# Patient Record
Sex: MALE | Race: WHITE | NOT HISPANIC OR LATINO | Employment: FULL TIME | URBAN - METROPOLITAN AREA
[De-identification: names, ages, dates, MRNs, and addresses within clinical notes are randomized per-mention and may not be internally consistent; named-entity substitution may affect disease eponyms.]

---

## 2017-02-21 ENCOUNTER — ALLSCRIPTS OFFICE VISIT (OUTPATIENT)
Dept: OTHER | Facility: OTHER | Age: 32
End: 2017-02-21

## 2018-01-03 ENCOUNTER — ALLSCRIPTS OFFICE VISIT (OUTPATIENT)
Dept: OTHER | Facility: OTHER | Age: 33
End: 2018-01-03

## 2018-01-04 NOTE — PROGRESS NOTES
Assessment   1  Acute frontal sinusitis, recurrence not specified (461 1) (J01 10)    Plan   Acute frontal sinusitis, recurrence not specified    · Amoxicillin-Pot Clavulanate 875-125 MG Oral Tablet; TAKE 1 TABLET EVERY 12    HOURS DAILY   · Azelastine HCl - 0 15 % Nasal Solution; 1squirt each nostril qhs    Discussion/Summary      Sinusitis plan of care , rec preventative measures , recommend increased humidity in the house recommend humidifier,treatment , norah pot , nasal rinse , if no history of htn / cad consider otc decongestant / plain muccinex for short term treatment , otc  consider the use of nasal steroid short term  The patient was counseled regarding instructions for management,-- patient and family education,-- importance of compliance with treatment  Possible side effects of new medications were reviewed with the patient/guardian today  The treatment plan was reviewed with the patient/guardian  The patient/guardian understands and agrees with the treatment plan      Chief Complaint   Pt has complaints of right ear pain , nasal drip , sore throat and discomfort in the corner of the right eye ( started two weeks ago with congestion ) Per pt he took Ibuprofen around noon today  History of Present Illness   HPI: feeling ill  ear pain , st , sinus pressure increased last night , but has been increasing for the past 2 wks  postnasal drip increasing and night causing needed cough more fever chills, negative wheezing ill contact month at home   heating system forced hot air, air house very dry, considering getting home humidifier taking no medicines other than ibuprofen          Review of Systems        Constitutional: feeling poorly  ENT: earache,-- sore throat-- and-- nasal discharge  Cardiovascular: no complaints of slow or fast heart rate, no chest pain, no palpitations, no leg claudication or lower extremity edema  Respiratory: cough        Gastrointestinal: no complaints of abdominal pain, no constipation, no nausea or vomiting, no diarrhea or bloody stools  Genitourinary: no complaints of dysuria or incontinence, no hesitancy, no nocturia, no genital lesion, no inadequacy of penile erection  Integumentary: no complaints of skin rash or lesion, no itching or dry skin, no skin wounds  Neurological: no complaints of headache, no confusion, no numbness or tingling, no dizziness or fainting  ROS reviewed  Active Problems   1  Acute sinus infection (461 9) (J01 90)   2  Anxiety (300 00) (F41 9)   3  Depression (311) (F32 9)   4  Elevated blood pressure reading (401 9) (I10)   5  Male fertility problem (606 9) (N46 9)   6  Need for influenza vaccination (V04 81) (Z23)   7  Screening for cardiovascular condition (V81 2) (Z13 6)   8  URI (upper respiratory infection) (465 9) (J06 9)   9  Varicocele (456 4) (I86 1)    Past Medical History   Active Problems And Past Medical History Reviewed: The active problems and past medical history were reviewed and updated today  Family History   Father    1  Denied: Family history of mental disorder   2  Denied: Family history of substance abuse   3  Family history of Paroxysmal atrial tachycardia (427 0) (I47 1)  Family History    4  Family history of Gout (274 9) (M10 9)  Family History Reviewed: The family history was reviewed and updated today  Social History    ·    · Never a smoker   · Never a smoker   · No alcohol use   · No drug use  The social history was reviewed and updated today  The social history was reviewed and is unchanged  Surgical History   1  History of Eye Surgery   2  History of Nasal Septal Deviation Repair   3  History of Surgery Penis Repair Of Hypospadias Complications  Surgical History Reviewed: The surgical history was reviewed and updated today  Current Meds    1  No Reported Medications Recorded     The medication list was reviewed and updated today  Allergies   1  No Known Drug Allergies    Vitals    Recorded: [id number] 04:09PM   Temperature 98 9 F   Heart Rate 749   Systolic 691   Diastolic 88   Height 6 ft 1 in   Weight 224 lb    BMI Calculated 29 55   BSA Calculated 2 26   O2 Saturation 97     Physical Exam        Constitutional      General appearance: No acute distress, well appearing and well nourished  Eyes      Conjunctiva and lids: No swelling, erythema, or discharge  Pupils and irises: Equal, round and reactive to light  Ears, Nose, Mouth, and Throat      External inspection of ears and nose: Normal        Otoscopic examination: Tympanic membrance translucent with normal light reflex  Canals patent without erythema  Nasal mucosa, septum, and turbinates: Abnormal  -- he is a congestion right ethmoid pressure, maxillary pressure  Oropharynx: Abnormal  -- slightly erythemic negative exudate positive postnasal       Pulmonary      Respiratory effort: No increased work of breathing or signs of respiratory distress  Auscultation of lungs: Clear to auscultation, equal breath sounds bilaterally, no wheezes, no rales, no rhonci  -- negative force  Cardiovascular      Auscultation of heart: Normal rate and rhythm, normal S1 and S2, without murmurs  Lymphatic      Palpation of lymph nodes in neck: No lymphadenopathy  Musculoskeletal      Gait and station: Normal        Digits and nails: Normal without clubbing or cyanosis  Inspection/palpation of joints, bones, and muscles: Normal        Skin      Skin and subcutaneous tissue: Normal without rashes or lesions  Psychiatric      Orientation to person, place and time: Normal        Mood and affect: Normal           Signatures    Electronically signed by :  DANNI Varela; Fidencio  3 2018  5:24PM EST                       (Author)     Electronically signed by : TAMARA Sorto ; Fidencio  3 2018 11:55PM EST

## 2018-01-11 NOTE — RESULT NOTES
Verified Results  (1) COMPREHENSIVE METABOLIC PANEL [id number] 13:42PM [address] Order Number: [id number]     Test Name Result Flag Reference   GLUCOSE,RANDM 89 mg/dL     If the patient is fasting, the ADA then defines impaired fasting glucose as > 100 mg/dL and diabetes as > or equal to 123 mg/dL  SODIUM 140 mmol/L  136-145   POTASSIUM 4 1 mmol/L  3 5-5 3   CHLORIDE 104 mmol/L  100-108   CARBON DIOXIDE 30 mmol/L  21-32   ANION GAP (CALC) 6 mmol/L  4-13   BLOOD UREA NITROGEN 13 mg/dL  5-25   CREATININE 0 92 mg/dL  0 60-1 30   Standardized to IDMS reference method   CALCIUM 9 0 mg/dL  8 3-10 1   BILI, TOTAL 0 49 mg/dL  0 20-1 00   ALK PHOSPHATAS 51 U/L     ALT (SGPT) 79 U/L H 12-78   AST(SGOT) 25 U/L  5-45   ALBUMIN 4 3 g/dL  3 5-5 0   TOTAL PROTEIN 7 7 g/dL  6 4-8 2   eGFR Non-African American      >60 0 ml/min/1 73sq m   - Patient Instructions: This is a fasting blood test  Water,black tea or black  coffee only after 9:00pm the night before test Drink 2 glasses of water the morning of test - Patient Instructions: This bloodwork is non-fasting  Please drink two glasses of   water morning of bloodwork  National Kidney Disease Education Program recommendations are as follows:  GFR calculation is accurate only with a steady state creatinine  Chronic Kidney disease less than 60 ml/min/1 73 sq  meters  Kidney failure less than 15 ml/min/1 73 sq  meters  (1) LIPID PANEL, FASTING [id number] 09:49AM [address] Order Number: [id number]     Test Name Result Flag Reference   CHOLESTEROL 118 mg/dL     HDL,DIRECT 41 mg/dL  40-60   Specimen collection should occur prior to Metamizole administration due to the potential for falsely depressed results  LDL CHOLESTEROL CALCULATED 56 mg/dL  0-100   - Patient Instructions:  This is a fasting blood test  Water,black tea or black  coffee only after 9:00pm the night before test   Drink 2 glasses of water the morning of test     - Patient Instructions: This is a fasting blood test  Water,black tea or black  coffee only after 9:00pm the night before test Drink 2 glasses of water the morning of test - Patient Instructions: This bloodwork is non-fasting  Please drink two glasses of   water morning of bloodwork  Triglyceride:         Normal              <150 mg/dl       Borderline High    150-199 mg/dl       High               200-499 mg/dl       Very High          >499 mg/dl  Cholesterol:         Desirable        <200 mg/dl      Borderline High  200-239 mg/dl      High             >239 mg/dl  HDL Cholesterol:        High    >59 mg/dL      Low     <41 mg/dL  LDL CALCULATED:    This screening LDL is a calculated result  It does not have the accuracy of the Direct Measured LDL in the monitoring of patients with hyperlipidemia and/or statin therapy  Direct Measure LDL (YFV674) must be ordered separately in these patients  TRIGLYCERIDES 103 mg/dL  <=150   Specimen collection should occur prior to N-Acetylcysteine or Metamizole administration due to the potential for falsely depressed results  (1) TSH [id number] 09:49AM [address] Order Number: [id number]     Test Name Result Flag Reference   TSH 1 750 uIU/mL  0 358-3 740   - Patient Instructions: This bloodwork is non-fasting  Please drink two glasses of water morning of bloodwork  - Patient Instructions: This is a fasting blood test  Water,black tea or black  coffee only after 9:00pm the night before test Drink 2 glasses of water the morning of test - Patient Instructions: This bloodwork is non-fasting  Please drink two glasses of   water morning of bloodwork  Patients undergoing fluorescein dye angiography may retain small amounts of fluorescein in the body for 48-72 hours post procedure  Samples containing fluorescein can produce falsely depressed TSH values  If the patient had this procedure,a specimen should be resubmitted post fluorescein clearance

## 2018-01-14 VITALS
TEMPERATURE: 98.9 F | DIASTOLIC BLOOD PRESSURE: 90 MMHG | HEART RATE: 86 BPM | SYSTOLIC BLOOD PRESSURE: 130 MMHG | BODY MASS INDEX: 30.09 KG/M2 | OXYGEN SATURATION: 98 % | WEIGHT: 227 LBS | HEIGHT: 73 IN

## 2018-01-23 VITALS
DIASTOLIC BLOOD PRESSURE: 88 MMHG | BODY MASS INDEX: 29.69 KG/M2 | WEIGHT: 224 LBS | OXYGEN SATURATION: 97 % | SYSTOLIC BLOOD PRESSURE: 134 MMHG | HEIGHT: 73 IN | TEMPERATURE: 98.9 F | HEART RATE: 100 BPM

## 2018-11-23 ENCOUNTER — TELEPHONE (OUTPATIENT)
Dept: FAMILY MEDICINE CLINIC | Facility: CLINIC | Age: 33
End: 2018-11-23

## 2018-11-23 NOTE — TELEPHONE ENCOUNTER
Pt is unable to come in this afternoon  He has an appt for pictures to be taken today  He was coming in to receive a referral to see GI specialist  He is going to check with his insurance company to see if referrals are needed and who participates with insurance  If he just needs a referral to GI, would you give referral without appt?

## 2018-11-23 NOTE — TELEPHONE ENCOUNTER
Patient called with abdominal pain in the mid- upper gastric area since last night  He stated that it is a moderate pain ( 6-7 on pain scale) that started prior to dinner   He wants to know if he should be seen or get a referral to a specialist?

## 2019-02-07 ENCOUNTER — OFFICE VISIT (OUTPATIENT)
Dept: URGENT CARE | Facility: CLINIC | Age: 34
End: 2019-02-07
Payer: COMMERCIAL

## 2019-02-07 VITALS
HEART RATE: 108 BPM | HEIGHT: 73 IN | OXYGEN SATURATION: 97 % | TEMPERATURE: 98.9 F | SYSTOLIC BLOOD PRESSURE: 138 MMHG | DIASTOLIC BLOOD PRESSURE: 88 MMHG | WEIGHT: 225 LBS | BODY MASS INDEX: 29.82 KG/M2 | RESPIRATION RATE: 16 BRPM

## 2019-02-07 DIAGNOSIS — F41.9 ANXIETY: Primary | ICD-10-CM

## 2019-02-07 PROCEDURE — G0382 LEV 3 HOSP TYPE B ED VISIT: HCPCS | Performed by: PHYSICIAN ASSISTANT

## 2019-02-07 PROCEDURE — 93005 ELECTROCARDIOGRAM TRACING: CPT | Performed by: PHYSICIAN ASSISTANT

## 2019-02-07 RX ORDER — HYDROXYZINE HYDROCHLORIDE 10 MG/1
25 TABLET, FILM COATED ORAL EVERY 6 HOURS PRN
Qty: 30 TABLET | Refills: 0 | Status: SHIPPED | OUTPATIENT
Start: 2019-02-07 | End: 2019-02-08

## 2019-02-07 NOTE — PROGRESS NOTES
[facility]        NAME: Darlene Orourke is a 29 y o  male  : 1985    MRN: [id number]  DATE: 2019  TIME: 5:17 PM    Assessment and Plan   Anxiety [F41 9]  1  Anxiety  hydrOXYzine HCL (ATARAX) 10 mg tablet         Patient Instructions       Follow up with PCP in 3-5 days  Proceed to  ER if symptoms worsen  Chief Complaint     Chief Complaint   Patient presents with    Stress     very stressed at work, having tightness in neck/ abd  pain/ Headache         History of Present Illness       This is a 64-year-old male complaining shortness of breath headache stomach a neck pain while at work  Patient reports he is short staffed at worked and accompanies relocating [place]  Patient reports that similar symptoms in the past due to anxiety  Patient reports he with sharp right checked blood pressure  Patient reports 170/80  Patient denies any chest pain shortness of nausea vomiting diarrhea constipation  Anxiety   Presents for initial visit  Symptoms include excessive worry, nausea, nervous/anxious behavior, palpitations and shortness of breath  Patient reports no chest pain or dizziness  The severity of symptoms is severe  The symptoms are aggravated by work stress  The quality of sleep is poor  Review of Systems   Review of Systems   Constitutional: Negative for chills, fatigue and fever  HENT: Negative for congestion, ear pain, hearing loss, postnasal drip, sinus pain, sinus pressure and sore throat  Eyes: Negative for pain and discharge  Respiratory: Positive for shortness of breath  Negative for chest tightness  Cardiovascular: Positive for palpitations  Negative for chest pain  Gastrointestinal: Positive for nausea  Negative for abdominal pain, constipation and vomiting  Genitourinary: Negative for difficulty urinating  Musculoskeletal: Negative for arthralgias and myalgias  Skin: Negative for rash     Neurological: Negative for dizziness and headaches  Psychiatric/Behavioral: Negative for behavioral problems  The patient is nervous/anxious  Current Medications       Current Outpatient Prescriptions:     hydrOXYzine HCL (ATARAX) 10 mg tablet, Take 2 5 tablets (25 mg total) by mouth every 6 (six) hours as needed for itching, Disp: 30 tablet, Rfl: 0    Current Allergies     Allergies as of 02/07/2019    (No Known Allergies)            The following portions of the patient's history were reviewed and updated as appropriate: allergies, current medications, past family history, past medical history, past social history, past surgical history and problem list      Past Medical History:   Diagnosis Date    Male infertility, unspecified     Varicocele       Past Surgical History:   Procedure Laterality Date    NASAL SEPTUM SURGERY N/A        Family History   Problem Relation Age of Onset    Hypothyroidism Mother     Heart disease Father          Medications have been verified  Objective   /88 (BP Location: Left arm, Patient Position: Sitting, Cuff Size: Large)   Pulse (!) 108   Temp 98 9 °F (37 2 °C) (Tympanic)   Resp 16   Ht 6' 1" (1 854 m)   Wt 102 kg (225 lb)   SpO2 97%   BMI 29 69 kg/m²        Physical Exam     Physical Exam   Constitutional: He is oriented to person, place, and time  He appears well-developed and well-nourished  HENT:   Right Ear: Tympanic membrane and external ear normal    Left Ear: Tympanic membrane and external ear normal    Neck: Normal range of motion  No edema present  Cardiovascular: Normal rate, regular rhythm, S1 normal, S2 normal and normal heart sounds  No murmur heard  Pulmonary/Chest: Effort normal and breath sounds normal  No respiratory distress  He has no wheezes  He has no rales  He exhibits no tenderness  Abdominal: Normal appearance and bowel sounds are normal  There is no tenderness   There is no rigidity, no rebound, no guarding, no CVA tenderness, no tenderness at McBurney's point and negative Nielsen's sign  No hernia  Negative obturator's  Negative psoas sign   Lymphadenopathy:     He has no cervical adenopathy  Neurological: He is alert and oriented to person, place, and time  Skin: Skin is warm, dry and intact  No rash noted  Psychiatric: He has a normal mood and affect  His speech is normal and behavior is normal    Nursing note and vitals reviewed

## 2019-02-07 NOTE — PATIENT INSTRUCTIONS
Anxiety   WHAT YOU NEED TO KNOW:   Anxiety is a condition that causes you to feel extremely worried or nervous  The feelings are so strong that they can cause problems with your daily activities or sleep  Anxiety may be triggered by something you fear, or it may happen without a cause  Family or work stress, smoking, caffeine, and alcohol can increase your risk for anxiety  Certain medicines or health conditions can also increase your risk  Anxiety can become a long-term condition if it is not managed or treated  DISCHARGE INSTRUCTIONS:   Call 911 if:   · You have chest pain, tightness, or heaviness that may spread to your shoulders, arms, jaw, neck, or back  · You feel like hurting yourself or someone else  Contact your healthcare provider if:   · Your symptoms get worse or do not get better with treatment  · Your anxiety keeps you from doing your regular daily activities  · You have new symptoms since your last visit  · You have questions or concerns about your condition or care  Medicines:   · Medicines  may be given to help you feel more calm and relaxed, and decrease your symptoms  · Take your medicine as directed  Contact your healthcare provider if you think your medicine is not helping or if you have side effects  Tell him of her if you are allergic to any medicine  Keep a list of the medicines, vitamins, and herbs you take  Include the amounts, and when and why you take them  Bring the list or the pill bottles to follow-up visits  Carry your medicine list with you in case of an emergency  Follow up with your healthcare provider within 2 weeks or as directed:  Write down your questions so you remember to ask them during your visits  Manage anxiety:   · Talk to someone about your anxiety  Your healthcare provider may suggest counseling  Cognitive behavioral therapy can help you understand and change how you react to events that trigger your symptoms   You might feel more comfortable talking with a friend or family member about your anxiety  Choose someone you know will be supportive and encouraging  · Find ways to relax  Activities such as exercise, meditation, or listening to music can help you relax  Spend time with friends, or do things you enjoy  · Practice deep breathing  Deep breathing can help you relax when you feel anxious  Focus on taking slow, deep breaths several times a day, or during an anxiety attack  Breathe in through your nose and out through your mouth  · Create a regular sleep routine  Regular sleep can help you feel calmer during the day  Go to sleep and wake up at the same times every day  Do not watch television or use the computer right before bed  Your room should be comfortable, dark, and quiet  · Eat a variety of healthy foods  Healthy foods include fruits, vegetables, low-fat dairy products, lean meats, fish, whole-grain breads, and cooked beans  Healthy foods can help you feel less anxious and have more energy  · Exercise regularly  Exercise can increase your energy level  Exercise may also lift your mood and help you sleep better  Your healthcare provider can help you create an exercise plan  · Do not smoke  Nicotine and other chemicals in cigarettes and cigars can increase anxiety  Ask your healthcare provider for information if you currently smoke and need help to quit  E-cigarettes or smokeless tobacco still contain nicotine  Talk to your healthcare provider before you use these products  · Do not have caffeine  Caffeine can make your symptoms worse  Do not have foods or drinks that are meant to increase your energy level  · Limit or do not drink alcohol  Ask your healthcare provider if alcohol is safe for you  You may not be able to drink alcohol if you take certain anxiety or depression medicines  Limit alcohol to 1 drink per day if you are a woman  Limit alcohol to 2 drinks per day if you are a man   A drink of alcohol is 12 ounces of beer, 5 ounces of wine, or 1½ ounces of liquor  · Do not use drugs  Drugs can make your anxiety worse  It can also make anxiety hard to manage  Talk to your healthcare provider if you use drugs and want help to quit  © 2017 [employer] Information is for End User's use only and may not be sold, redistributed or otherwise used for commercial purposes  All illustrations and images included in CareNotes® are the copyrighted property of A D A M , Chris  or Barrett Fernández  The above information is an  only  It is not intended as medical advice for individual conditions or treatments  Talk to your doctor, nurse or pharmacist before following any medical regimen to see if it is safe and effective for you

## 2019-02-07 NOTE — LETTER
February 7, 2019     Patient: Antonino Medeiros   YOB: 1985   Date of Visit: 2/7/2019       To Whom it May Concern:    Antonino Medeiros was seen in my clinic on 2/7/2019  Please excuse illness  If you have any questions or concerns, please don't hesitate to call           Sincerely,          Onesimo Rascon PA-C        CC: No Recipients

## 2019-02-08 ENCOUNTER — OFFICE VISIT (OUTPATIENT)
Dept: FAMILY MEDICINE CLINIC | Facility: CLINIC | Age: 34
End: 2019-02-08
Payer: COMMERCIAL

## 2019-02-08 VITALS
SYSTOLIC BLOOD PRESSURE: 140 MMHG | OXYGEN SATURATION: 98 % | WEIGHT: 227 LBS | HEIGHT: 73 IN | DIASTOLIC BLOOD PRESSURE: 88 MMHG | HEART RATE: 100 BPM | BODY MASS INDEX: 30.09 KG/M2 | TEMPERATURE: 98.1 F

## 2019-02-08 DIAGNOSIS — F41.9 ANXIETY: ICD-10-CM

## 2019-02-08 DIAGNOSIS — K29.00 OTHER ACUTE GASTRITIS WITHOUT HEMORRHAGE: Primary | ICD-10-CM

## 2019-02-08 LAB
ATRIAL RATE: 101 BPM
P AXIS: 50 DEGREES
PR INTERVAL: 136 MS
QRS AXIS: 52 DEGREES
QRSD INTERVAL: 84 MS
QT INTERVAL: 338 MS
QTC INTERVAL: 438 MS
T WAVE AXIS: 46 DEGREES
VENTRICULAR RATE: 101 BPM

## 2019-02-08 PROCEDURE — 93010 ELECTROCARDIOGRAM REPORT: CPT | Performed by: INTERNAL MEDICINE

## 2019-02-08 PROCEDURE — 99213 OFFICE O/P EST LOW 20 MIN: CPT | Performed by: FAMILY MEDICINE

## 2019-02-08 RX ORDER — ESOMEPRAZOLE MAGNESIUM 40 MG/1
40 CAPSULE, DELAYED RELEASE ORAL DAILY
Qty: 30 CAPSULE | Refills: 1 | Status: SHIPPED | OUTPATIENT
Start: 2019-02-08 | End: 2019-04-24 | Stop reason: SDUPTHER

## 2019-02-08 RX ORDER — LORAZEPAM 0.5 MG/1
0.5 TABLET ORAL EVERY 8 HOURS PRN
Qty: 20 TABLET | Refills: 0 | Status: ON HOLD | OUTPATIENT
Start: 2019-02-08 | End: 2019-03-25 | Stop reason: ALTCHOICE

## 2019-02-08 NOTE — PROGRESS NOTES
Assessment/Plan:       Diagnoses and all orders for this visit:    Other acute gastritis without hemorrhage  -     esomeprazole (NexIUM) 40 MG capsule; Take 1 capsule (40 mg total) by mouth daily  -     Ambulatory referral to Gastroenterology; Future    Anxiety  -     LORazepam (ATIVAN) 0 5 mg tablet; Take 1 tablet (0 5 mg total) by mouth every 8 (eight) hours as needed for anxiety         He is to call if his symptoms worsen  Make an appointment GI  Subjective:      Patient ID: Lee Saladna is a 29 y o  male  Patient comes in today with a one-month history of upper abdominal discomfort, he states is worse when he eats, he has taken over-the-counter omeprazole and the symptoms improved  He states he has been under a lot of stress at work as his job is relocating  He states that he is very anxious  This is making his abdominal pain worse  The following portions of the patient's history were reviewed and updated as appropriate:   He has a past medical history of Male infertility, unspecified  ,   does not have any pertinent problems on file  ,   has a past surgical history that includes Nasal septum surgery (N/A)  ,  family history includes Heart disease in his father; Hypothyroidism in his mother  ,   reports that he has never smoked  He has never used smokeless tobacco  His alcohol and drug histories are not on file  ,  has No Known Allergies     Current Outpatient Prescriptions   Medication Sig Dispense Refill    esomeprazole (NexIUM) 40 MG capsule Take 1 capsule (40 mg total) by mouth daily 30 capsule 1    LORazepam (ATIVAN) 0 5 mg tablet Take 1 tablet (0 5 mg total) by mouth every 8 (eight) hours as needed for anxiety 20 tablet 0     No current facility-administered medications for this visit  Review of Systems   Constitutional: Negative for chills, fatigue and fever  HENT: Negative for congestion, ear pain, hearing loss, postnasal drip, rhinorrhea and sore throat      Eyes: Negative for pain and visual disturbance  Respiratory: Negative for chest tightness, shortness of breath and wheezing  Cardiovascular: Negative for chest pain and leg swelling  Gastrointestinal: Positive for abdominal pain  Negative for abdominal distention, constipation, diarrhea and vomiting  Endocrine: Negative for cold intolerance and heat intolerance  Genitourinary: Negative for difficulty urinating, frequency and urgency  Musculoskeletal: Negative for arthralgias and gait problem  Skin: Negative for color change  Neurological: Negative for dizziness, tremors, syncope, numbness and headaches  Hematological: Negative for adenopathy  Psychiatric/Behavioral: Negative for agitation, confusion and sleep disturbance  The patient is nervous/anxious  Objective:  Vitals:    02/08/19 0939   BP: 140/88   Pulse: 100   Temp: 98 1 °F (36 7 °C)   SpO2: 98%   Weight: 103 kg (227 lb)   Height: 6' 1" (1 854 m)     Body mass index is 29 95 kg/m²  Physical Exam   Constitutional: He is oriented to person, place, and time  He appears well-developed and well-nourished  HENT:   Head: Normocephalic  Mouth/Throat: Oropharynx is clear and moist    Eyes: Conjunctivae are normal  No scleral icterus  Neck: Normal range of motion  No thyromegaly present  Cardiovascular: Normal rate, regular rhythm and normal heart sounds  No murmur heard  Pulmonary/Chest: Effort normal and breath sounds normal  No respiratory distress  He has no wheezes  Abdominal: Soft  Bowel sounds are normal  He exhibits no distension  There is no tenderness  Musculoskeletal: Normal range of motion  He exhibits no edema or tenderness  Lymphadenopathy:     He has no cervical adenopathy  Neurological: He is alert and oriented to person, place, and time  No cranial nerve deficit  Skin: Skin is warm and dry  No rash noted  No pallor  Psychiatric: He has a normal mood and affect   His behavior is normal    Nursing note and vitals reviewed

## 2019-02-25 ENCOUNTER — OFFICE VISIT (OUTPATIENT)
Dept: GASTROENTEROLOGY | Facility: CLINIC | Age: 34
End: 2019-02-25
Payer: COMMERCIAL

## 2019-02-25 VITALS
SYSTOLIC BLOOD PRESSURE: 152 MMHG | DIASTOLIC BLOOD PRESSURE: 110 MMHG | BODY MASS INDEX: 29.9 KG/M2 | HEART RATE: 87 BPM | WEIGHT: 226.6 LBS

## 2019-02-25 DIAGNOSIS — R10.13 EPIGASTRIC PAIN: Primary | ICD-10-CM

## 2019-02-25 PROCEDURE — 99203 OFFICE O/P NEW LOW 30 MIN: CPT | Performed by: INTERNAL MEDICINE

## 2019-02-25 NOTE — PROGRESS NOTES
[facility] - Outpatient Consultation  Aida Schroeder 29 y o  male MRN: [id number]  Encounter: [id number]          ASSESSMENT AND PLAN:      1  Epigastric pain  Will continue Esomeprazole 40mg once daily   Update EGD    ______________________________________________________________________    HPI:  29year old male presents for evaluation of abdominal pain  Pain is located in the epigastrum and substernal region  This has been present and worsening for several months  He was put on Esomeprazole 40mg once daily 2 weeks ago and this has helped  He admits to nausea but denies hematemesis or melena  He denies dysphagia or weight loss  He has suffered increased stress recently but denies exessive alcohol or NSAID use  He had an EGD approximately 10 years ago which showed gastritis per the patient  Then he was treated with Nexium and symptoms resolved until recently  REVIEW OF SYSTEMS:    CONSTITUTIONAL: Denies any fever, chills, rigors, and weight loss  HEENT: No earache or tinnitus  Denies hearing loss or visual disturbances  CARDIOVASCULAR: No chest pain or palpitations  RESPIRATORY: Denies any cough, hemoptysis, shortness of breath or dyspnea on exertion  GASTROINTESTINAL: As noted in the History of Present Illness  GENITOURINARY: No problems with urination  Denies any hematuria or dysuria  NEUROLOGIC: No dizziness or vertigo, denies headaches  MUSCULOSKELETAL: Denies any muscle or joint pain  SKIN: Denies skin rashes or itching  ENDOCRINE: Denies excessive thirst  Denies intolerance to heat or cold  PSYCHOSOCIAL: Denies depression or anxiety  Denies any recent memory loss         Historical Information   Past Medical History:   Diagnosis Date    History of stomach ulcers     Male infertility, unspecified     Varicocele     Past Surgical History:   Procedure Laterality Date    NASAL SEPTUM SURGERY N/A      Social History   Social History     Substance and Sexual Activity   Alcohol Use Yes    Comment: social     Social History     Substance and Sexual Activity   Drug Use Never     Social History     Tobacco Use   Smoking Status Never Smoker   Smokeless Tobacco Never Used     Family History   Problem Relation Age of Onset    Hypothyroidism Mother     Heart disease Father        Meds/Allergies       Current Outpatient Medications:     esomeprazole (NexIUM) 40 MG capsule    LORazepam (ATIVAN) 0 5 mg tablet    No Known Allergies        Objective     Blood pressure (!) 152/110, pulse 87, weight 103 kg (226 lb 9 6 oz)  Body mass index is 29 9 kg/m²  PHYSICAL EXAM:      General Appearance:   Alert, cooperative, no distress   HEENT:   Normocephalic, atraumatic, anicteric      Neck:  Supple, symmetrical, trachea midline   Lungs:   Clear to auscultation bilaterally; no rales, rhonchi or wheezing; respirations unlabored    Heart[de-identified]   Regular rate and rhythm; no murmur, rub, or gallop  Abdomen:   Soft, non-tender, non-distended; normal bowel sounds; no masses, no organomegaly    Genitalia:   Deferred    Rectal:   Deferred    Extremities:  No cyanosis, clubbing or edema    Pulses:  2+ and symmetric    Skin:  No jaundice, rashes, or lesions    Lymph nodes:  No palpable cervical lymphadenopathy        Lab Results:   No visits with results within 1 Day(s) from this visit  Latest known visit with results is:   Office Visit on 02/07/2019   Component Date Value    Ventricular Rate 02/07/2019 101     Atrial Rate 02/07/2019 101     NH Interval 02/07/2019 136     QRSD Interval 02/07/2019 84     QT Interval 02/07/2019 338     QTC Interval 02/07/2019 438     P Axis 02/07/2019 50     QRS Axis 02/07/2019 52     T Wave Axis 02/07/2019 46          Radiology Results:   No results found

## 2019-02-25 NOTE — LETTER
February 25, 2019     Yomi Bach, Doctor [address]    Patient: Yahir Patel   YOB: 1985   Date of Visit: 2/25/2019       Dear Dr John Huynh: Thank you for referring Yahir Patel to me for evaluation  Below are my notes for this consultation  If you have questions, please do not hesitate to call me  I look forward to following your patient along with you  Sincerely,        Red Sainz DO        CC: No Recipients  Red Sainz DO  2/25/2019  2:12 PM  Sign at close encounter  [facility] - Outpatient Consultation  Yahir Patel 29 y o  male MRN: [id number]  Encounter: [id number]          ASSESSMENT AND PLAN:      1  Epigastric pain  Will continue Esomeprazole 40mg once daily   Update EGD    ______________________________________________________________________    HPI:  29year old male presents for evaluation of abdominal pain  Pain is located in the epigastrum and substernal region  This has been present and worsening for several months  He was put on Esomeprazole 40mg once daily 2 weeks ago and this has helped  He admits to nausea but denies hematemesis or melena  He denies dysphagia or weight loss  He has suffered increased stress recently but denies exessive alcohol or NSAID use  He had an EGD approximately 10 years ago which showed gastritis per the patient  Then he was treated with Nexium and symptoms resolved until recently  REVIEW OF SYSTEMS:    CONSTITUTIONAL: Denies any fever, chills, rigors, and weight loss  HEENT: No earache or tinnitus  Denies hearing loss or visual disturbances  CARDIOVASCULAR: No chest pain or palpitations  RESPIRATORY: Denies any cough, hemoptysis, shortness of breath or dyspnea on exertion  GASTROINTESTINAL: As noted in the History of Present Illness  GENITOURINARY: No problems with urination  Denies any hematuria or dysuria    NEUROLOGIC: No dizziness or vertigo, denies headaches  MUSCULOSKELETAL: Denies any muscle or joint pain  SKIN: Denies skin rashes or itching  ENDOCRINE: Denies excessive thirst  Denies intolerance to heat or cold  PSYCHOSOCIAL: Denies depression or anxiety  Denies any recent memory loss  Historical Information   Past Medical History:   Diagnosis Date    History of stomach ulcers     Male infertility, unspecified     Varicocele     Past Surgical History:   Procedure Laterality Date    NASAL SEPTUM SURGERY N/A      Social History   Social History     Substance and Sexual Activity   Alcohol Use Yes    Comment: social     Social History     Substance and Sexual Activity   Drug Use Never     Social History     Tobacco Use   Smoking Status Never Smoker   Smokeless Tobacco Never Used     Family History   Problem Relation Age of Onset    Hypothyroidism Mother     Heart disease Father        Meds/Allergies       Current Outpatient Medications:     esomeprazole (NexIUM) 40 MG capsule    LORazepam (ATIVAN) 0 5 mg tablet    No Known Allergies        Objective     Blood pressure (!) 152/110, pulse 87, weight 103 kg (226 lb 9 6 oz)  Body mass index is 29 9 kg/m²  PHYSICAL EXAM:      General Appearance:   Alert, cooperative, no distress   HEENT:   Normocephalic, atraumatic, anicteric      Neck:  Supple, symmetrical, trachea midline   Lungs:   Clear to auscultation bilaterally; no rales, rhonchi or wheezing; respirations unlabored    Heart[de-identified]   Regular rate and rhythm; no murmur, rub, or gallop  Abdomen:   Soft, non-tender, non-distended; normal bowel sounds; no masses, no organomegaly    Genitalia:   Deferred    Rectal:   Deferred    Extremities:  No cyanosis, clubbing or edema    Pulses:  2+ and symmetric    Skin:  No jaundice, rashes, or lesions    Lymph nodes:  No palpable cervical lymphadenopathy        Lab Results:   No visits with results within 1 Day(s) from this visit     Latest known visit with results is:   Office Visit on 02/07/2019   Component Date Value    Ventricular Rate 02/07/2019 101     Atrial Rate 02/07/2019 101     TN Interval 02/07/2019 136     QRSD Interval 02/07/2019 84     QT Interval 02/07/2019 338     QTC Interval 02/07/2019 438     P Axis 02/07/2019 50     QRS Axis 02/07/2019 52     T Wave Axis 02/07/2019 46          Radiology Results:   No results found

## 2019-02-26 PROBLEM — R10.13 EPIGASTRIC PAIN: Status: ACTIVE | Noted: 2019-02-26

## 2019-03-11 ENCOUNTER — OFFICE VISIT (OUTPATIENT)
Dept: FAMILY MEDICINE CLINIC | Facility: CLINIC | Age: 34
End: 2019-03-11
Payer: COMMERCIAL

## 2019-03-11 ENCOUNTER — TELEPHONE (OUTPATIENT)
Dept: FAMILY MEDICINE CLINIC | Facility: CLINIC | Age: 34
End: 2019-03-11

## 2019-03-11 ENCOUNTER — HOSPITAL ENCOUNTER (OUTPATIENT)
Dept: RADIOLOGY | Facility: HOSPITAL | Age: 34
Discharge: HOME | End: 2019-03-11
Payer: COMMERCIAL

## 2019-03-11 VITALS
WEIGHT: 221 LBS | OXYGEN SATURATION: 94 % | TEMPERATURE: 99.5 F | DIASTOLIC BLOOD PRESSURE: 80 MMHG | SYSTOLIC BLOOD PRESSURE: 122 MMHG | HEIGHT: 73 IN | BODY MASS INDEX: 29.29 KG/M2 | HEART RATE: 129 BPM

## 2019-03-11 DIAGNOSIS — R05.9 COUGH IN ADULT: ICD-10-CM

## 2019-03-11 DIAGNOSIS — J20.9 ACUTE BRONCHITIS, UNSPECIFIED ORGANISM: Primary | ICD-10-CM

## 2019-03-11 DIAGNOSIS — J20.9 ACUTE BRONCHITIS, UNSPECIFIED ORGANISM: ICD-10-CM

## 2019-03-11 PROCEDURE — 99213 OFFICE O/P EST LOW 20 MIN: CPT | Performed by: PHYSICIAN ASSISTANT

## 2019-03-11 PROCEDURE — 1036F TOBACCO NON-USER: CPT | Performed by: PHYSICIAN ASSISTANT

## 2019-03-11 PROCEDURE — 3008F BODY MASS INDEX DOCD: CPT | Performed by: PHYSICIAN ASSISTANT

## 2019-03-11 PROCEDURE — 71046 X-RAY EXAM CHEST 2 VIEWS: CPT

## 2019-03-11 RX ORDER — ALBUTEROL SULFATE 90 UG/1
2 AEROSOL, METERED RESPIRATORY (INHALATION) EVERY 6 HOURS PRN
Qty: 1 INHALER | Refills: 0 | Status: ON HOLD | OUTPATIENT
Start: 2019-03-11 | End: 2019-03-25 | Stop reason: ALTCHOICE

## 2019-03-11 NOTE — PROGRESS NOTES
Assessment/Plan:       Diagnoses and all orders for this visit:    Acute bronchitis, unspecified organism  -     XR chest pa & lateral; Future  -     albuterol (PROVENTIL HFA,VENTOLIN HFA) 90 mcg/act inhaler; Inhale 2 puffs every 6 (six) hours as needed for wheezing    Cough in adult            Subjective:      Patient ID: Antonino Medeiros is a 29 y o  male  Cough   This is a new problem  The current episode started in the past 7 days  The problem has been gradually worsening  The problem occurs every few minutes  The cough is productive of purulent sputum  Associated symptoms include chest pain, chills, a fever, headaches, nasal congestion, a sore throat and wheezing  Pertinent negatives include no myalgias, rash or shortness of breath  The symptoms are aggravated by lying down  Risk factors for lung disease include travel  He has tried OTC cough suppressant for the symptoms  The treatment provided no relief  His past medical history is significant for asthma  Back Pain   This is a new problem  The current episode started in the past 7 days  The problem occurs daily  The problem has been waxing and waning since onset  The pain is present in the thoracic spine  The quality of the pain is described as aching  The pain is at a severity of 8/10  The pain is the same all the time  The symptoms are aggravated by coughing  Stiffness is present all day  Associated symptoms include chest pain, a fever and headaches  Pertinent negatives include no abdominal pain or numbness  The treatment provided no relief  Fever   This is a new problem  The current episode started in the past 7 days  The problem occurs daily  The problem has been waxing and waning  Associated symptoms include chest pain, chills, congestion, coughing, fatigue, a fever, headaches, nausea, a sore throat and vomiting   Pertinent negatives include no abdominal pain, arthralgias, change in bowel habit, joint swelling, myalgias, numbness, rash, swollen glands or urinary symptoms  The symptoms are aggravated by coughing  He has tried acetaminophen for the symptoms  The treatment provided mild relief  The following portions of the patient's history were reviewed and updated as appropriate:   He has a past medical history of History of stomach ulcers and Male infertility, unspecified  ,  does not have any pertinent problems on file  ,   has a past surgical history that includes Nasal septum surgery (N/A)  ,  family history includes Heart disease in his father; Hypothyroidism in his mother  ,   reports that he has never smoked  He has never used smokeless tobacco  He reports that he drinks alcohol  He reports that he does not use drugs  ,  has No Known Allergies     Current Outpatient Medications   Medication Sig Dispense Refill    esomeprazole (NexIUM) 40 MG capsule Take 1 capsule (40 mg total) by mouth daily 30 capsule 1    LORazepam (ATIVAN) 0 5 mg tablet Take 1 tablet (0 5 mg total) by mouth every 8 (eight) hours as needed for anxiety 20 tablet 0    albuterol (PROVENTIL HFA,VENTOLIN HFA) 90 mcg/act inhaler Inhale 2 puffs every 6 (six) hours as needed for wheezing 1 Inhaler 0     No current facility-administered medications for this visit  Review of Systems   Constitutional: Positive for chills, fatigue and fever  HENT: Positive for congestion, sinus pressure and sore throat  Eyes: Negative for pain  Respiratory: Positive for cough and wheezing  Negative for shortness of breath  Cardiovascular: Positive for chest pain  Gastrointestinal: Positive for nausea and vomiting  Negative for abdominal pain and change in bowel habit  Musculoskeletal: Positive for back pain  Negative for arthralgias, joint swelling and myalgias  Skin: Negative for rash  Allergic/Immunologic: Negative  Neurological: Positive for headaches  Negative for dizziness and numbness           Objective:  Vitals:    03/11/19 1140   BP: 122/80   Pulse: (!) 129   Temp: 99 5 °F (37 5 °C)   SpO2: 94%   Weight: 100 kg (221 lb)   Height: 6' 1" (1 854 m)     Body mass index is 29 16 kg/m²  Physical Exam   Constitutional: He is oriented to person, place, and time  He appears well-developed and well-nourished  He has a sickly appearance  HENT:   Head: Normocephalic  Right Ear: Tympanic membrane, external ear and ear canal normal    Left Ear: Tympanic membrane, external ear and ear canal normal    Mouth/Throat: Uvula is midline and mucous membranes are normal  Posterior oropharyngeal erythema present  Eyes: Conjunctivae are normal    Cardiovascular: Regular rhythm and normal heart sounds  Tachycardia present  Pulmonary/Chest: Effort normal  He has wheezes  Neurological: He is alert and oriented to person, place, and time  Skin: Skin is warm and dry  Psychiatric: He has a normal mood and affect  His behavior is normal    Nursing note and vitals reviewed

## 2019-03-11 NOTE — PATIENT INSTRUCTIONS
Acute Cough   WHAT YOU NEED TO KNOW:   What is an acute cough? An acute cough can last up to 3 weeks  Common causes of an acute cough include a cold, allergies, or a lung infection  How is the cause of an acute cough diagnosed? Your healthcare provider will examine you and listen to your lungs  Tell your healthcare provider if you cough up any mucus, or have a fever or shortness of breath  Also tell your provider what makes the cough better or worse  Depending on your symptoms, you may need a chest x-ray  A sample of mucus may be collected and tested for infection  How is an acute cough treated? An acute cough usually goes away on its own  Ask your healthcare provider about medicines you can take to decrease your cough  You may need medicine to stop the cough, decrease swelling in your airways, or help open your airways  Medicine may also be given to help you cough up mucus  If you have an infection caused by bacteria, you may need antibiotics  What can I do to manage my cough? · Do not smoke and stay away from others who smoke  Nicotine and other chemicals in cigarettes and cigars can cause lung damage and make your cough worse  Ask your healthcare provider for information if you currently smoke and need help to quit  E-cigarettes or smokeless tobacco still contain nicotine  Talk to your healthcare provider before you use these products  · Drink extra liquids as directed  Liquids will help thin and loosen mucus so you can cough it up  Liquids will also help prevent dehydration  Examples of good liquids to drink include water, fruit juice, and broth  Do not drink liquids that contain caffeine  Caffeine can increase your risk for dehydration  Ask your healthcare provider how much liquid to drink each day  · Rest as directed  Do not do activities that make your cough worse, such as exercise  · Use a humidifier or vaporizer    Use a cool mist humidifier or a vaporizer to increase air moisture in your home  This may make it easier for you to breathe and help decrease your cough  · Eat 2 to 5 mL of honey 2 times each day  Honey can help thin mucus and decrease your cough  · Use cough drops or lozenges  These can help decrease throat irritation and your cough  When should I seek immediate care? · You have trouble breathing or feel short of breath  · You cough up blood, or you see blood in your mucus  · You faint or feel weak or dizzy  · You have chest pain when you cough or take a deep breath  · You have new wheezing  When should I contact my healthcare provider? · You have a fever  · Your cough lasts longer than 4 weeks  · Your symptoms do not improve with treatment  · You have questions or concerns about your condition or care  CARE AGREEMENT:   You have the right to help plan your care  Learn about your health condition and how it may be treated  Discuss treatment options with your caregivers to decide what care you want to receive  You always have the right to refuse treatment  The above information is an  only  It is not intended as medical advice for individual conditions or treatments  Talk to your doctor, nurse or pharmacist before following any medical regimen to see if it is safe and effective for you  © 2017 [employer]  Information is for End User's use only and may not be sold, redistributed or otherwise used for commercial purposes  All illustrations and images included in CareNotes® are the copyrighted property of A [employer] M [employer]  or Barrett Fernández

## 2019-03-14 ENCOUNTER — TELEPHONE (OUTPATIENT)
Dept: FAMILY MEDICINE CLINIC | Facility: CLINIC | Age: 34
End: 2019-03-14

## 2019-03-14 DIAGNOSIS — J20.9 ACUTE BRONCHITIS, UNSPECIFIED ORGANISM: Primary | ICD-10-CM

## 2019-03-14 RX ORDER — AZITHROMYCIN 250 MG/1
TABLET, FILM COATED ORAL
Qty: 6 TABLET | Refills: 0 | Status: SHIPPED | OUTPATIENT
Start: 2019-03-14 | End: 2019-03-18

## 2019-03-14 NOTE — TELEPHONE ENCOUNTER
Pt called and not better and is taking all of the medication but it is not working and wants to know if you can send in an antibiotics  [id number]  [phone]

## 2019-03-23 ENCOUNTER — ANESTHESIA EVENT (OUTPATIENT)
Dept: PERIOP | Facility: HOSPITAL | Age: 34
End: 2019-03-23
Payer: COMMERCIAL

## 2019-03-23 NOTE — ANESTHESIA PREPROCEDURE EVALUATION
Review of Systems/Medical History  Patient summary reviewed        Cardiovascular  EKG reviewed, Negative cardio ROS    Pulmonary  Asthma ,        GI/Hepatic    GERD ,        Negative  ROS        Endo/Other  Negative endo/other ROS      GYN  Negative gynecology ROS          Hematology  Negative hematology ROS      Musculoskeletal  Negative musculoskeletal ROS        Neurology  Negative neurology ROS      Psychology   Anxiety, Depression ,              Physical Exam    Airway    Mallampati score: I  TM Distance: >3 FB  Neck ROM: full     Dental       Cardiovascular  Comment: Negative ROS, Cardiovascular exam normal    Pulmonary  Pulmonary exam normal     Other Findings        Anesthesia Plan  ASA Score- 2     Anesthesia Type- IV sedation with anesthesia with ASA Monitors  Additional Monitors:   Airway Plan:         Plan Factors-    Induction- intravenous  Postoperative Plan-     Informed Consent- Anesthetic plan and risks discussed with patient  I personally reviewed this patient with the CRNA  Discussed and agreed on the Anesthesia Plan with the CRNA  Serena Charles

## 2019-03-25 ENCOUNTER — HOSPITAL ENCOUNTER (OUTPATIENT)
Facility: HOSPITAL | Age: 34
Setting detail: OUTPATIENT SURGERY
Discharge: HOME | End: 2019-03-25
Attending: INTERNAL MEDICINE | Admitting: INTERNAL MEDICINE
Payer: COMMERCIAL

## 2019-03-25 ENCOUNTER — ANESTHESIA (OUTPATIENT)
Dept: PERIOP | Facility: HOSPITAL | Age: 34
End: 2019-03-25
Payer: COMMERCIAL

## 2019-03-25 VITALS
DIASTOLIC BLOOD PRESSURE: 87 MMHG | TEMPERATURE: 98.1 F | SYSTOLIC BLOOD PRESSURE: 129 MMHG | OXYGEN SATURATION: 96 % | RESPIRATION RATE: 23 BRPM | HEART RATE: 81 BPM

## 2019-03-25 DIAGNOSIS — R10.13 EPIGASTRIC PAIN: ICD-10-CM

## 2019-03-25 PROCEDURE — 43239 EGD BIOPSY SINGLE/MULTIPLE: CPT | Performed by: INTERNAL MEDICINE

## 2019-03-25 PROCEDURE — 88305 TISSUE EXAM BY PATHOLOGIST: CPT | Performed by: PATHOLOGY

## 2019-03-25 RX ORDER — LIDOCAINE HYDROCHLORIDE 10 MG/ML
INJECTION, SOLUTION INFILTRATION; PERINEURAL AS NEEDED
Status: DISCONTINUED | OUTPATIENT
Start: 2019-03-25 | End: 2019-03-25 | Stop reason: SURG

## 2019-03-25 RX ORDER — SODIUM CHLORIDE, SODIUM LACTATE, POTASSIUM CHLORIDE, CALCIUM CHLORIDE 600; 310; 30; 20 MG/100ML; MG/100ML; MG/100ML; MG/100ML
125 INJECTION, SOLUTION INTRAVENOUS CONTINUOUS
Status: DISCONTINUED | OUTPATIENT
Start: 2019-03-25 | End: 2019-03-25 | Stop reason: HOSPADM

## 2019-03-25 RX ORDER — PROPOFOL 10 MG/ML
INJECTION, EMULSION INTRAVENOUS AS NEEDED
Status: DISCONTINUED | OUTPATIENT
Start: 2019-03-25 | End: 2019-03-25 | Stop reason: SURG

## 2019-03-25 RX ADMIN — PROPOFOL 50 MG: 10 INJECTION, EMULSION INTRAVENOUS at 08:33

## 2019-03-25 RX ADMIN — PROPOFOL 200 MG: 10 INJECTION, EMULSION INTRAVENOUS at 08:31

## 2019-03-25 RX ADMIN — PROPOFOL 50 MG: 10 INJECTION, EMULSION INTRAVENOUS at 08:32

## 2019-03-25 RX ADMIN — LIDOCAINE HYDROCHLORIDE 100 MG: 10 INJECTION, SOLUTION INFILTRATION; PERINEURAL at 08:25

## 2019-03-25 RX ADMIN — SODIUM CHLORIDE, SODIUM LACTATE, POTASSIUM CHLORIDE, AND CALCIUM CHLORIDE 125 ML/HR: .6; .31; .03; .02 INJECTION, SOLUTION INTRAVENOUS at 07:27

## 2019-03-28 ENCOUNTER — TELEPHONE (OUTPATIENT)
Dept: GASTROENTEROLOGY | Facility: CLINIC | Age: 34
End: 2019-03-28

## 2019-03-28 DIAGNOSIS — K21.9 GASTROESOPHAGEAL REFLUX DISEASE WITHOUT ESOPHAGITIS: Primary | ICD-10-CM

## 2019-03-28 RX ORDER — ESOMEPRAZOLE MAGNESIUM 40 MG/1
40 CAPSULE, DELAYED RELEASE ORAL DAILY
Qty: 31 CAPSULE | Refills: 11 | OUTPATIENT
Start: 2019-03-28

## 2019-04-24 DIAGNOSIS — K29.00 OTHER ACUTE GASTRITIS WITHOUT HEMORRHAGE: ICD-10-CM

## 2019-04-24 RX ORDER — ESOMEPRAZOLE MAGNESIUM 40 MG/1
40 CAPSULE, DELAYED RELEASE ORAL DAILY
Qty: 90 CAPSULE | Refills: 3 | Status: SHIPPED | OUTPATIENT
Start: 2019-04-24 | End: 2020-05-19

## 2019-05-05 ENCOUNTER — OFFICE VISIT (OUTPATIENT)
Dept: URGENT CARE | Facility: CLINIC | Age: 34
End: 2019-05-05
Payer: COMMERCIAL

## 2019-05-05 VITALS
OXYGEN SATURATION: 96 % | WEIGHT: 215 LBS | HEART RATE: 100 BPM | HEIGHT: 74 IN | DIASTOLIC BLOOD PRESSURE: 94 MMHG | RESPIRATION RATE: 18 BRPM | SYSTOLIC BLOOD PRESSURE: 122 MMHG | TEMPERATURE: 98.5 F | BODY MASS INDEX: 27.59 KG/M2

## 2019-05-05 DIAGNOSIS — J02.0 STREP PHARYNGITIS: Primary | ICD-10-CM

## 2019-05-05 LAB — S PYO AG THROAT QL: POSITIVE

## 2019-05-05 PROCEDURE — G0382 LEV 3 HOSP TYPE B ED VISIT: HCPCS | Performed by: PHYSICIAN ASSISTANT

## 2019-05-05 RX ORDER — CEPHALEXIN 500 MG/1
500 CAPSULE ORAL EVERY 12 HOURS SCHEDULED
Qty: 20 CAPSULE | Refills: 0 | Status: SHIPPED | OUTPATIENT
Start: 2019-05-05 | End: 2019-05-10

## 2019-05-05 RX ORDER — METHYLPREDNISOLONE 4 MG/1
TABLET ORAL
Qty: 1 EACH | Refills: 0 | Status: SHIPPED | OUTPATIENT
Start: 2019-05-05 | End: 2019-05-10

## 2019-05-10 ENCOUNTER — OFFICE VISIT (OUTPATIENT)
Dept: FAMILY MEDICINE CLINIC | Facility: CLINIC | Age: 34
End: 2019-05-10
Payer: COMMERCIAL

## 2019-05-10 VITALS
DIASTOLIC BLOOD PRESSURE: 76 MMHG | RESPIRATION RATE: 16 BRPM | SYSTOLIC BLOOD PRESSURE: 128 MMHG | WEIGHT: 225 LBS | HEART RATE: 87 BPM | BODY MASS INDEX: 28.88 KG/M2 | TEMPERATURE: 98.3 F | HEIGHT: 74 IN | OXYGEN SATURATION: 98 %

## 2019-05-10 DIAGNOSIS — H10.32 ACUTE CONJUNCTIVITIS OF LEFT EYE, UNSPECIFIED ACUTE CONJUNCTIVITIS TYPE: ICD-10-CM

## 2019-05-10 DIAGNOSIS — J06.9 URI, ACUTE: ICD-10-CM

## 2019-05-10 DIAGNOSIS — H11.9 CONJUNCTIVA DISORDER: Primary | ICD-10-CM

## 2019-05-10 PROCEDURE — 3008F BODY MASS INDEX DOCD: CPT | Performed by: FAMILY MEDICINE

## 2019-05-10 PROCEDURE — 1036F TOBACCO NON-USER: CPT | Performed by: FAMILY MEDICINE

## 2019-05-10 PROCEDURE — 99213 OFFICE O/P EST LOW 20 MIN: CPT | Performed by: FAMILY MEDICINE

## 2019-05-10 RX ORDER — CIPROFLOXACIN HYDROCHLORIDE 3.5 MG/ML
1 SOLUTION/ DROPS TOPICAL 4 TIMES DAILY
Qty: 5 ML | Refills: 0 | Status: SHIPPED | OUTPATIENT
Start: 2019-05-10 | End: 2019-11-25

## 2019-05-10 RX ORDER — AMOXICILLIN AND CLAVULANATE POTASSIUM 875; 125 MG/1; MG/1
1 TABLET, FILM COATED ORAL EVERY 12 HOURS SCHEDULED
Qty: 20 TABLET | Refills: 0 | Status: SHIPPED | OUTPATIENT
Start: 2019-05-10 | End: 2019-05-20

## 2019-05-21 ENCOUNTER — TELEPHONE (OUTPATIENT)
Dept: FAMILY MEDICINE CLINIC | Facility: CLINIC | Age: 34
End: 2019-05-21

## 2019-05-21 DIAGNOSIS — J06.9 URI, ACUTE: Primary | ICD-10-CM

## 2019-05-21 RX ORDER — DOXYCYCLINE HYCLATE 100 MG/1
100 CAPSULE ORAL EVERY 12 HOURS SCHEDULED
Qty: 20 CAPSULE | Refills: 0 | Status: SHIPPED | OUTPATIENT
Start: 2019-05-21 | End: 2019-05-31

## 2019-05-22 NOTE — ANESTHESIA POSTPROCEDURE EVALUATION
Patient informed and scheduled   Post-Op Assessment Note    CV Status:  Stable  Pain Score: 0    Pain management: adequate     Mental Status:  Awake   Hydration Status:  Stable   PONV Controlled:  Controlled   Airway Patency:  Patent   Post Op Vitals Reviewed: Yes      Staff: CRNA           BP   150/86   Temp      Pulse  78   Resp   16   SpO2  96

## 2019-09-09 ENCOUNTER — TELEPHONE (OUTPATIENT)
Dept: GASTROENTEROLOGY | Facility: CLINIC | Age: 34
End: 2019-09-09

## 2019-09-09 NOTE — TELEPHONE ENCOUNTER
Dr Donn Schafer - Patient called [facility] insurance - no longer cover esomeprazole    Please call patient at [phone] ty

## 2019-09-11 NOTE — TELEPHONE ENCOUNTER
Patient called gave new insurance it is [employer]  It is entered into patient's chart   Please call Megan Park when script is sent [phone] ty

## 2019-09-16 NOTE — TELEPHONE ENCOUNTER
Pt called inquiring about the status of medication approval  Pt very upset he has been off med for a week   Ty

## 2019-10-14 ENCOUNTER — OFFICE VISIT (OUTPATIENT)
Dept: FAMILY MEDICINE CLINIC | Facility: CLINIC | Age: 34
End: 2019-10-14
Payer: COMMERCIAL

## 2019-10-14 VITALS
HEIGHT: 74 IN | SYSTOLIC BLOOD PRESSURE: 122 MMHG | DIASTOLIC BLOOD PRESSURE: 86 MMHG | TEMPERATURE: 98.6 F | OXYGEN SATURATION: 96 % | WEIGHT: 228 LBS | HEART RATE: 104 BPM | BODY MASS INDEX: 29.26 KG/M2

## 2019-10-14 DIAGNOSIS — M76.892 ADDUCTOR TENDINITIS OF LEFT HIP: Primary | ICD-10-CM

## 2019-10-14 PROCEDURE — 3008F BODY MASS INDEX DOCD: CPT | Performed by: FAMILY MEDICINE

## 2019-10-14 PROCEDURE — 99213 OFFICE O/P EST LOW 20 MIN: CPT | Performed by: FAMILY MEDICINE

## 2019-10-14 NOTE — PROGRESS NOTES
BMI Counseling: Body mass index is 29 27 kg/m²  The BMI is above normal  Nutrition recommendations include decreasing portion sizes, encouraging healthy choices of fruits and vegetables and decreasing fast food intake  Exercise recommendations include moderate physical activity 150 minutes/week  Assessment/Plan:       Diagnoses and all orders for this visit:    Adductor tendinitis of left hip        Encouraged him to use an intermittent ice to the area, he is to call if he develops any worsening pain or swelling in the area  Subjective:      Patient ID: Josi Ho is a 36 y o  male  Patient comes in today complaining of pain is left groin when he brings his legs together, he states that this is been very intermittent, he denies any lumps or bumps in the area  He states this started approximately a month ago when he rolled over and tried to bring his left leg over  He denies any testicular pain, no difficulty urinating or moving his bowels  The following portions of the patient's history were reviewed and updated as appropriate:   He has a past medical history of GERD (gastroesophageal reflux disease), History of stomach ulcers, and Male infertility, unspecified  ,  does not have any pertinent problems on file  ,   has a past surgical history that includes Nasal septum surgery (N/A); Eye surgery (Left); Nasal septum surgery; and pr esophagogastroduodenoscopy transoral diagnostic (N/A, 3/25/2019)  ,  family history includes Heart disease in his father; Hypothyroidism in his mother  ,   reports that he has never smoked  He has never used smokeless tobacco  He reports that he drinks alcohol  He reports that he does not use drugs  ,  has No Known Allergies     Current Outpatient Medications   Medication Sig Dispense Refill    ciprofloxacin (CILOXAN) 0 3 % ophthalmic solution Administer 1 drop into the left eye 4 (four) times a day 5 mL 0    esomeprazole (NexIUM) 40 MG capsule Take 1 capsule (40 mg total) by mouth daily 90 capsule 3     No current facility-administered medications for this visit  Review of Systems   Constitutional: Negative for chills, fatigue and fever  HENT: Negative for congestion, ear pain, hearing loss, postnasal drip, rhinorrhea and sore throat  Eyes: Negative for pain and visual disturbance  Respiratory: Negative for chest tightness, shortness of breath and wheezing  Cardiovascular: Negative for chest pain and leg swelling  Gastrointestinal: Negative for abdominal distention, abdominal pain, constipation, diarrhea and vomiting  Left groin pain   Endocrine: Negative for cold intolerance and heat intolerance  Genitourinary: Negative for difficulty urinating, frequency and urgency  Musculoskeletal: Negative for arthralgias and gait problem  Skin: Negative for color change  Neurological: Negative for dizziness, tremors, syncope, numbness and headaches  Hematological: Negative for adenopathy  Psychiatric/Behavioral: Negative for agitation, confusion and sleep disturbance  The patient is not nervous/anxious  Objective:  Vitals:    10/14/19 1356   BP: 122/86   Pulse: 104   Temp: 98 6 °F (37 °C)   SpO2: 96%   Weight: 103 kg (228 lb)   Height: 6' 2" (1 88 m)     Body mass index is 29 27 kg/m²  Physical Exam   Constitutional: He is oriented to person, place, and time  He appears well-developed and well-nourished  HENT:   Head: Normocephalic  Mouth/Throat: Oropharynx is clear and moist    Eyes: Conjunctivae are normal  No scleral icterus  Neck: Normal range of motion  No thyromegaly present  Cardiovascular: Normal rate, regular rhythm and normal heart sounds  No murmur heard  Pulmonary/Chest: Effort normal and breath sounds normal  No respiratory distress  He has no wheezes  Abdominal: Soft  Bowel sounds are normal  He exhibits no distension and no mass  There is no tenderness  There is no guarding  No hernia     Mild tenderness at the insertion of the abductor tendons of the left hip   Musculoskeletal: Normal range of motion  He exhibits no edema or tenderness  Lymphadenopathy:     He has no cervical adenopathy  Neurological: He is alert and oriented to person, place, and time  No cranial nerve deficit  Skin: Skin is warm and dry  No rash noted  No pallor  Psychiatric: He has a normal mood and affect  His behavior is normal    Nursing note and vitals reviewed  RBBB

## 2019-11-25 ENCOUNTER — OFFICE VISIT (OUTPATIENT)
Dept: FAMILY MEDICINE CLINIC | Facility: CLINIC | Age: 34
End: 2019-11-25
Payer: COMMERCIAL

## 2019-11-25 VITALS
DIASTOLIC BLOOD PRESSURE: 96 MMHG | OXYGEN SATURATION: 96 % | BODY MASS INDEX: 30.16 KG/M2 | SYSTOLIC BLOOD PRESSURE: 130 MMHG | RESPIRATION RATE: 16 BRPM | WEIGHT: 235 LBS | HEART RATE: 86 BPM | TEMPERATURE: 98.6 F | HEIGHT: 74 IN

## 2019-11-25 DIAGNOSIS — J01.00 ACUTE NON-RECURRENT MAXILLARY SINUSITIS: Primary | ICD-10-CM

## 2019-11-25 DIAGNOSIS — R05.9 COUGH: ICD-10-CM

## 2019-11-25 PROCEDURE — 1036F TOBACCO NON-USER: CPT | Performed by: NURSE PRACTITIONER

## 2019-11-25 PROCEDURE — 99213 OFFICE O/P EST LOW 20 MIN: CPT | Performed by: NURSE PRACTITIONER

## 2019-11-25 RX ORDER — AMOXICILLIN AND CLAVULANATE POTASSIUM 875; 125 MG/1; MG/1
1 TABLET, FILM COATED ORAL EVERY 12 HOURS SCHEDULED
Qty: 14 TABLET | Refills: 0 | Status: SHIPPED | OUTPATIENT
Start: 2019-11-25 | End: 2019-12-02

## 2019-11-25 RX ORDER — BENZONATATE 100 MG/1
100 CAPSULE ORAL 3 TIMES DAILY PRN
Qty: 20 CAPSULE | Refills: 0 | Status: SHIPPED | OUTPATIENT
Start: 2019-11-25 | End: 2020-02-14 | Stop reason: ALTCHOICE

## 2019-11-25 RX ORDER — FLUTICASONE PROPIONATE 50 MCG
1 SPRAY, SUSPENSION (ML) NASAL DAILY
Qty: 1 BOTTLE | Refills: 2 | Status: SHIPPED | OUTPATIENT
Start: 2019-11-25 | End: 2020-02-14 | Stop reason: ALTCHOICE

## 2019-11-25 NOTE — PROGRESS NOTES
Assessment/Plan:     Chronic Problems:  No problem-specific Assessment & Plan notes found for this encounter  Visit Diagnosis:  Diagnoses and all orders for this visit:    Acute non-recurrent maxillary sinusitis  Comments: Will treat with augmentin and flonase  Orders:  -     amoxicillin-clavulanate (AUGMENTIN) 875-125 mg per tablet; Take 1 tablet by mouth every 12 (twelve) hours for 7 days  -     fluticasone (FLONASE) 50 mcg/act nasal spray; 1 spray into each nostril daily    Cough  Comments:  Use tessalon perles as needed for cough suppressant  Orders:  -     benzonatate (TESSALON PERLES) 100 mg capsule; Take 1 capsule (100 mg total) by mouth 3 (three) times a day as needed for cough          Subjective:    Patient ID: Jerlean Meckel is a 36 y o  male  Here with cough, productive cough with brown sputum  Started about 10 days ago  Sinus pain and pressure  Using advil cold with some relief  Using humidifier  The following portions of the patient's history were reviewed and updated as appropriate: allergies, current medications, past family history, past medical history, past social history, past surgical history and problem list     Review of Systems   Constitutional: Negative for chills, fatigue and fever  HENT: Positive for congestion, postnasal drip, rhinorrhea, sinus pressure, sinus pain, sore throat and trouble swallowing  Negative for ear pain  Respiratory: Positive for cough  Negative for chest tightness, shortness of breath and wheezing  Cardiovascular: Negative for chest pain and palpitations           /96   Pulse 86   Temp 98 6 °F (37 °C)   Resp 16   Ht 6' 2" (1 88 m)   Wt 107 kg (235 lb)   SpO2 96%   BMI 30 17 kg/m²   Social History     Socioeconomic History    Marital status: /Civil Union     Spouse name: Not on file    Number of children: Not on file    Years of education: Not on file    Highest education level: Not on file   Occupational History    Not on file   Social Needs    Financial resource strain: Not on file    Food insecurity:     Worry: Not on file     Inability: Not on file    Transportation needs:     Medical: Not on file     Non-medical: Not on file   Tobacco Use    Smoking status: Never Smoker    Smokeless tobacco: Never Used   Substance and Sexual Activity    Alcohol use: Yes     Comment: social-holidyas    Drug use: Never    Sexual activity: Not on file   Lifestyle    Physical activity:     Days per week: Not on file     Minutes per session: Not on file    Stress: Not on file   Relationships    Social connections:     Talks on phone: Not on file     Gets together: Not on file     Attends Cheondoism service: Not on file     Active member of club or organization: Not on file     Attends meetings of clubs or organizations: Not on file     Relationship status: Not on file    Intimate partner violence:     Fear of current or ex partner: Not on file     Emotionally abused: Not on file     Physically abused: Not on file     Forced sexual activity: Not on file   Other Topics Concern    Not on file   Social History Narrative    Not on file     Past Medical History:   Diagnosis Date    GERD (gastroesophageal reflux disease)     History of stomach ulcers     Male infertility, unspecified     Varicocele     Family History   Problem Relation Age of Onset    Hypothyroidism Mother     Heart disease Father      Past Surgical History:   Procedure Laterality Date    EYE SURGERY Left     NASAL SEPTUM SURGERY N/A     NASAL SEPTUM SURGERY      NC ESOPHAGOGASTRODUODENOSCOPY TRANSORAL DIAGNOSTIC N/A 3/25/2019    Procedure: ESOPHAGOGASTRODUODENOSCOPY (EGD); Surgeon: Renzo Medrano DO;  Location: [facility];   Service: Gastroenterology       Current Outpatient Medications:     esomeprazole (NexIUM) 40 MG capsule, Take 1 capsule (40 mg total) by mouth daily, Disp: 90 capsule, Rfl: 3    amoxicillin-clavulanate (AUGMENTIN) 875-125 mg per tablet, Take 1 tablet by mouth every 12 (twelve) hours for 7 days, Disp: 14 tablet, Rfl: 0    benzonatate (TESSALON PERLES) 100 mg capsule, Take 1 capsule (100 mg total) by mouth 3 (three) times a day as needed for cough, Disp: 20 capsule, Rfl: 0    fluticasone (FLONASE) 50 mcg/act nasal spray, 1 spray into each nostril daily, Disp: 1 Bottle, Rfl: 2    No Known Allergies       Lab Review   No visits with results within 2 Month(s) from this visit  Latest known visit with results is:   Office Visit on 05/05/2019   Component Date Value     RAPID STREP A 05/05/2019 Positive*        Imaging: No results found  Objective:     Physical Exam   Constitutional: He appears well-developed  No distress  HENT:   Right Ear: External ear normal    Left Ear: External ear normal    Nose: Right sinus exhibits maxillary sinus tenderness  Left sinus exhibits maxillary sinus tenderness  Mouth/Throat: Oropharynx is clear and moist  No oropharyngeal exudate  Eyes: Pupils are equal, round, and reactive to light  Conjunctivae are normal  Right eye exhibits no discharge  Left eye exhibits no discharge  Neck: Normal range of motion  Cardiovascular: Normal rate, regular rhythm and normal heart sounds  No murmur heard  Pulmonary/Chest: Effort normal and breath sounds normal  No respiratory distress  He has no wheezes  Lymphadenopathy:     He has no cervical adenopathy  Patient Instructions   Take antibiotic with food twice a day for 7 days  Use flonase  Lots of rest and lots of fluids  DANNI Swift    Portions of the record may have been created with voice recognition software  Occasional wrong word or "sound a like" substitutions may have occurred due to the inherent limitations of voice recognition software  Read the chart carefully and recognize, using context, where substitutions have occurred

## 2020-02-14 ENCOUNTER — OFFICE VISIT (OUTPATIENT)
Dept: FAMILY MEDICINE CLINIC | Facility: CLINIC | Age: 35
End: 2020-02-14
Payer: COMMERCIAL

## 2020-02-14 VITALS
BODY MASS INDEX: 32.08 KG/M2 | TEMPERATURE: 98 F | OXYGEN SATURATION: 96 % | DIASTOLIC BLOOD PRESSURE: 86 MMHG | HEIGHT: 74 IN | WEIGHT: 250 LBS | SYSTOLIC BLOOD PRESSURE: 124 MMHG | HEART RATE: 102 BPM

## 2020-02-14 DIAGNOSIS — J01.10 ACUTE FRONTAL SINUSITIS, RECURRENCE NOT SPECIFIED: Primary | ICD-10-CM

## 2020-02-14 PROBLEM — J06.9 URI, ACUTE: Status: RESOLVED | Noted: 2019-05-10 | Resolved: 2020-02-14

## 2020-02-14 PROBLEM — J20.9 ACUTE BRONCHITIS: Status: RESOLVED | Noted: 2019-03-11 | Resolved: 2020-02-14

## 2020-02-14 PROBLEM — H10.32 ACUTE CONJUNCTIVITIS OF LEFT EYE: Status: RESOLVED | Noted: 2019-05-10 | Resolved: 2020-02-14

## 2020-02-14 PROCEDURE — 3008F BODY MASS INDEX DOCD: CPT | Performed by: FAMILY MEDICINE

## 2020-02-14 PROCEDURE — 3074F SYST BP LT 130 MM HG: CPT | Performed by: PHYSICIAN ASSISTANT

## 2020-02-14 PROCEDURE — 1036F TOBACCO NON-USER: CPT | Performed by: PHYSICIAN ASSISTANT

## 2020-02-14 PROCEDURE — 99213 OFFICE O/P EST LOW 20 MIN: CPT | Performed by: PHYSICIAN ASSISTANT

## 2020-02-14 PROCEDURE — 3079F DIAST BP 80-89 MM HG: CPT | Performed by: PHYSICIAN ASSISTANT

## 2020-02-14 PROCEDURE — 3008F BODY MASS INDEX DOCD: CPT | Performed by: PHYSICIAN ASSISTANT

## 2020-02-14 RX ORDER — AZELASTINE 1 MG/ML
1 SPRAY, METERED NASAL 2 TIMES DAILY
Qty: 1 BOTTLE | Refills: 0 | Status: SHIPPED | OUTPATIENT
Start: 2020-02-14 | End: 2021-05-05

## 2020-02-14 RX ORDER — AMOXICILLIN AND CLAVULANATE POTASSIUM 875; 125 MG/1; MG/1
1 TABLET, FILM COATED ORAL EVERY 12 HOURS SCHEDULED
Qty: 20 TABLET | Refills: 0 | Status: SHIPPED | OUTPATIENT
Start: 2020-02-14 | End: 2020-02-24

## 2020-02-14 NOTE — PATIENT INSTRUCTIONS

## 2020-02-14 NOTE — PROGRESS NOTES
Assessment/Plan:          Diagnoses and all orders for this visit:    Acute frontal sinusitis, recurrence not specified  -     amoxicillin-clavulanate (AUGMENTIN) 875-125 mg per tablet; Take 1 tablet by mouth every 12 (twelve) hours for 10 days  -     azelastine (ASTELIN) 0 1 % nasal spray; 1 spray into each nostril 2 (two) times a day Use in each nostril as directed        Subjective:      Patient ID: Josi Ho is a 36 y o  male  Sore Throat    This is a new problem  The current episode started 1 to 4 weeks ago  The problem has been unchanged  There has been no fever  The pain is moderate  Associated symptoms include congestion, coughing, diarrhea, headaches and a plugged ear sensation  Pertinent negatives include no abdominal pain, hoarse voice, shortness of breath or trouble swallowing  He has tried NSAIDs for the symptoms  The treatment provided no relief  Headache    The current episode started 1 to 4 weeks ago  The problem occurs constantly  The problem has been unchanged  The pain is located in the bilateral region  The pain radiates to the face  The pain quality is similar to prior headaches  The quality of the pain is described as throbbing  The pain is moderate  Associated symptoms include coughing, sinus pressure and a sore throat  Pertinent negatives include no abdominal pain, back pain, dizziness, fever or nausea  The symptoms are aggravated by activity  He has tried NSAIDs for the symptoms  The treatment provided moderate relief  The following portions of the patient's history were reviewed and updated as appropriate:   He has a past medical history of GERD (gastroesophageal reflux disease), History of stomach ulcers, and Male infertility, unspecified  ,  does not have any pertinent problems on file  ,   has a past surgical history that includes Nasal septum surgery (N/A); Eye surgery (Left);  Nasal septum surgery; and pr esophagogastroduodenoscopy transoral diagnostic (N/A, 3/25/2019)  ,  family history includes Heart disease in his father; Hypothyroidism in his mother  ,   reports that he has never smoked  He has never used smokeless tobacco  He reports that he drinks alcohol  He reports that he does not use drugs  ,  has No Known Allergies     Current Outpatient Medications   Medication Sig Dispense Refill    esomeprazole (NexIUM) 40 MG capsule Take 1 capsule (40 mg total) by mouth daily 90 capsule 3    amoxicillin-clavulanate (AUGMENTIN) 875-125 mg per tablet Take 1 tablet by mouth every 12 (twelve) hours for 10 days 20 tablet 0    azelastine (ASTELIN) 0 1 % nasal spray 1 spray into each nostril 2 (two) times a day Use in each nostril as directed 1 Bottle 0     No current facility-administered medications for this visit  Review of Systems   Constitutional: Positive for fatigue  Negative for activity change and fever  HENT: Positive for congestion, postnasal drip, sinus pressure and sore throat  Negative for hoarse voice and trouble swallowing  Respiratory: Positive for cough  Negative for chest tightness and shortness of breath  Gastrointestinal: Positive for diarrhea  Negative for abdominal pain and nausea  Musculoskeletal: Negative for back pain  Neurological: Positive for headaches  Negative for dizziness and light-headedness  Objective:  Vitals:    02/14/20 1530   BP: 124/86   Pulse: 102   Temp: 98 °F (36 7 °C)   SpO2: 96%   Weight: 113 kg (250 lb)   Height: 6' 2" (1 88 m)     Body mass index is 32 1 kg/m²  Physical Exam   Constitutional: He is oriented to person, place, and time  He appears well-developed and well-nourished  HENT:   Head: Normocephalic  Right Ear: Tympanic membrane and ear canal normal    Left Ear: Tympanic membrane and ear canal normal    Nose: Mucosal edema present  Right sinus exhibits frontal sinus tenderness  Right sinus exhibits no maxillary sinus tenderness  Left sinus exhibits frontal sinus tenderness   Left sinus exhibits no maxillary sinus tenderness  Mouth/Throat: Uvula is midline and mucous membranes are normal  Uvula swelling present  Posterior oropharyngeal edema and posterior oropharyngeal erythema present  No tonsillar exudate  Neck: Normal range of motion  Cardiovascular: Normal rate, regular rhythm and normal heart sounds  Pulmonary/Chest: Effort normal and breath sounds normal    Lymphadenopathy:     He has cervical adenopathy  Neurological: He is alert and oriented to person, place, and time  Skin: Skin is warm and dry  Psychiatric: He has a normal mood and affect  His behavior is normal    Nursing note and vitals reviewed

## 2020-04-09 ENCOUNTER — TELEMEDICINE (OUTPATIENT)
Dept: FAMILY MEDICINE CLINIC | Facility: CLINIC | Age: 35
End: 2020-04-09
Payer: COMMERCIAL

## 2020-04-09 DIAGNOSIS — R42 DIZZINESS: Primary | ICD-10-CM

## 2020-04-09 DIAGNOSIS — R09.81 HEAD CONGESTION: ICD-10-CM

## 2020-04-09 PROCEDURE — 99213 OFFICE O/P EST LOW 20 MIN: CPT | Performed by: FAMILY MEDICINE

## 2020-04-09 RX ORDER — FLUTICASONE PROPIONATE 50 MCG
2 SPRAY, SUSPENSION (ML) NASAL DAILY
Qty: 1 BOTTLE | Refills: 1 | Status: SHIPPED | OUTPATIENT
Start: 2020-04-09 | End: 2021-05-05

## 2020-04-09 RX ORDER — MECLIZINE HYDROCHLORIDE 25 MG/1
25 TABLET ORAL 3 TIMES DAILY PRN
Qty: 30 TABLET | Refills: 0 | Status: SHIPPED | OUTPATIENT
Start: 2020-04-09 | End: 2021-05-05

## 2020-04-09 RX ORDER — METHYLPREDNISOLONE 4 MG/1
TABLET ORAL
Qty: 21 EACH | Refills: 0 | Status: SHIPPED | OUTPATIENT
Start: 2020-04-09 | End: 2021-05-05

## 2020-05-19 DIAGNOSIS — K29.00 OTHER ACUTE GASTRITIS WITHOUT HEMORRHAGE: ICD-10-CM

## 2020-05-19 RX ORDER — ESOMEPRAZOLE MAGNESIUM 40 MG/1
CAPSULE, DELAYED RELEASE ORAL
Qty: 90 CAPSULE | Refills: 3 | Status: SHIPPED | OUTPATIENT
Start: 2020-05-19 | End: 2021-06-01

## 2020-05-20 ENCOUNTER — TELEPHONE (OUTPATIENT)
Dept: GASTROENTEROLOGY | Facility: CLINIC | Age: 35
End: 2020-05-20

## 2020-09-16 ENCOUNTER — OFFICE VISIT (OUTPATIENT)
Dept: FAMILY MEDICINE CLINIC | Facility: CLINIC | Age: 35
End: 2020-09-16
Payer: COMMERCIAL

## 2020-09-16 VITALS
DIASTOLIC BLOOD PRESSURE: 94 MMHG | WEIGHT: 226 LBS | TEMPERATURE: 98.5 F | BODY MASS INDEX: 29 KG/M2 | HEIGHT: 74 IN | OXYGEN SATURATION: 97 % | SYSTOLIC BLOOD PRESSURE: 138 MMHG | HEART RATE: 98 BPM

## 2020-09-16 DIAGNOSIS — H81.13 BENIGN PAROXYSMAL POSITIONAL VERTIGO DUE TO BILATERAL VESTIBULAR DISORDER: Primary | ICD-10-CM

## 2020-09-16 PROCEDURE — 99214 OFFICE O/P EST MOD 30 MIN: CPT | Performed by: STUDENT IN AN ORGANIZED HEALTH CARE EDUCATION/TRAINING PROGRAM

## 2020-09-16 PROCEDURE — 3725F SCREEN DEPRESSION PERFORMED: CPT | Performed by: STUDENT IN AN ORGANIZED HEALTH CARE EDUCATION/TRAINING PROGRAM

## 2020-09-16 RX ORDER — PROCHLORPERAZINE MALEATE 5 MG/1
5 TABLET ORAL EVERY 6 HOURS PRN
Qty: 30 TABLET | Refills: 0 | Status: SHIPPED | OUTPATIENT
Start: 2020-09-16 | End: 2021-05-05

## 2020-09-16 NOTE — PATIENT INSTRUCTIONS
Benign Paroxysmal Positional Vertigo   WHAT YOU NEED TO KNOW:   BPPV is an inner ear condition that causes you to suddenly feel dizzy  Benign means it is not serious or life-threatening  BPPV is caused by a problem with the nerves and structure of your inner ear  BPPV happens when small pieces of calcium break loose and lump together in one of your inner ear canals  DISCHARGE INSTRUCTIONS:   Return to the emergency department if:   · You fall during a BPPV episode and are injured  · You have a severe headache that does not go away  · You have new changes in your vision or feel weak or confused  · You have problems hearing, or you have ringing or buzzing in your ears  Contact your healthcare provider if:   · Your BPPV symptoms do not go away or they return  · You have problems with your balance, or you are falling often  · You have new or increased nausea or vomiting with vertigo  · You feel anxious or depressed and do not want to leave your home  · You have questions or concerns about your condition or care  Medicines:   · Medicines  may be recommended or prescribed to treat dizziness or nausea  · Take your medicine as directed  Contact your healthcare provider if you think your medicine is not helping or if you have side effects  Tell him of her if you are allergic to any medicine  Keep a list of the medicines, vitamins, and herbs you take  Include the amounts, and when and why you take them  Bring the list or the pill bottles to follow-up visits  Carry your medicine list with you in case of an emergency  Prevent your symptoms:   · Try to avoid sudden head movements  Stand up and lie down slowly  · Raise and support your head when you lie down  Place pillows under your upper back and head or rest in a recliner  · Change your position often when you are lying down  Try not to lie with your head on the same side for long periods of time  Roll over slowly       · Wear protective gear  when you ride a bike or play sports  A helmet helps protect your head from injury  Follow up with your healthcare provider as directed: You may need to return in 1 month to check the progress of your treatment  Write down your questions so you remember to ask them during your visits  © 2017 [employer] Information is for End User's use only and may not be sold, redistributed or otherwise used for commercial purposes  All illustrations and images included in CareNotes® are the copyrighted property of [employer]  or Barrett Fernández  The above information is an  only  It is not intended as medical advice for individual conditions or treatments  Talk to your doctor, nurse or pharmacist before following any medical regimen to see if it is safe and effective for you

## 2020-09-16 NOTE — PROGRESS NOTES
Assessment/Plan:         Problem List Items Addressed This Visit     None      Visit Diagnoses     Benign paroxysmal positional vertigo due to bilateral vestibular disorder    -  Primary    Relevant Medications    prochlorperazine (COMPAZINE) 5 mg tablet    Other Relevant Orders    Ambulatory referral to Physical Therapy          Minimal improvement with and fever  Discussed will try Compazine and discuss signs symptoms to monitor for  He will call back if symptoms are persistent or worsen, or if new symptoms such as hearing loss, ringing, or fevers appear  Provided handout on how to perform the Epley maneuvers at home    Subjective:      Patient ID: Billy Sandoval is a 30 y o  male  HPI  80-year-old male coming in for sudden onset dizziness upon week and yesterday  It is worse with specific head movement such as getting up out of bed and turning to the right  He has had no recent sick contacts or viral symptoms however he does have a family member who was diagnosed labyrinthitis in the last few weeks as well with similar symptoms  He denies any ringing in his ears, fevers or URI symptoms besides some chronic congestion, ringing, or changes in hearing  No headaches and dizziness is not unilateral    The following portions of the patient's history were reviewed and updated as appropriate:   He has a past medical history of GERD (gastroesophageal reflux disease), History of stomach ulcers, and Male infertility, unspecified  ,  does not have any pertinent problems on file  ,   has a past surgical history that includes Nasal septum surgery (N/A); Eye surgery (Left); Nasal septum surgery; and pr esophagogastroduodenoscopy transoral diagnostic (N/A, 3/25/2019)  ,  family history includes Heart disease in his father; Hypothyroidism in his mother  ,   reports that he has never smoked  He has never used smokeless tobacco  He reports current alcohol use  He reports that he does not use drugs  ,  has No Known Allergies     Current Outpatient Medications   Medication Sig Dispense Refill    esomeprazole (NexIUM) 40 MG capsule TAKE ONE CAPSULE BY MOUTH EVERY DAY 90 capsule 3    fluticasone (FLONASE) 50 mcg/act nasal spray 2 sprays into each nostril daily 1 Bottle 1    meclizine (ANTIVERT) 25 mg tablet Take 1 tablet (25 mg total) by mouth 3 (three) times a day as needed for dizziness 30 tablet 0    azelastine (ASTELIN) 0 1 % nasal spray 1 spray into each nostril 2 (two) times a day Use in each nostril as directed (Patient not taking: Reported on 9/16/2020) 1 Bottle 0    methylPREDNISolone 4 MG tablet therapy pack Use as directed on package (Patient not taking: Reported on 9/16/2020) 21 each 0    prochlorperazine (COMPAZINE) 5 mg tablet Take 1 tablet (5 mg total) by mouth every 6 (six) hours as needed for nausea or vomiting 30 tablet 0     No current facility-administered medications for this visit  Review of Systems   Constitutional: Negative for activity change, appetite change, fatigue and fever  HENT: Positive for congestion (chronic)  Negative for ear discharge, ear pain, rhinorrhea, sinus pressure, sinus pain, sore throat and trouble swallowing  Eyes: Negative for photophobia and visual disturbance  Respiratory: Negative for shortness of breath  Cardiovascular: Negative for chest pain and palpitations  Gastrointestinal: Positive for nausea  Negative for abdominal pain and vomiting  Musculoskeletal: Negative for myalgias  Neurological: Positive for dizziness and headaches  Negative for tremors, syncope, weakness and light-headedness  Objective:  Vitals:    09/16/20 1308   BP: 138/94   Pulse: 98   Temp: 98 5 °F (36 9 °C)   SpO2: 97%   Weight: 103 kg (226 lb)   Height: 6' 2" (1 88 m)     Body mass index is 29 02 kg/m²  Physical Exam  Constitutional:       General: He is not in acute distress  Appearance: He is not ill-appearing  HENT:      Head: Normocephalic and atraumatic  Right Ear: External ear normal       Left Ear: External ear normal       Nose: Nose normal  No congestion or rhinorrhea  Mouth/Throat:      Mouth: Mucous membranes are moist       Pharynx: Oropharynx is clear  No oropharyngeal exudate or posterior oropharyngeal erythema  Eyes:      Extraocular Movements: Extraocular movements intact  Conjunctiva/sclera: Conjunctivae normal       Pupils: Pupils are equal, round, and reactive to light  Neck:      Musculoskeletal: Normal range of motion and neck supple  Pulmonary:      Effort: No respiratory distress  Breath sounds: Normal breath sounds  Musculoskeletal: Normal range of motion  Skin:     General: Skin is warm and dry  Capillary Refill: Capillary refill takes less than 2 seconds  Findings: No rash  Neurological:      General: No focal deficit present  Mental Status: He is alert and oriented to person, place, and time  Mental status is at baseline  GCS: GCS eye subscore is 4  GCS verbal subscore is 5  GCS motor subscore is 6  Cranial Nerves: Cranial nerves are intact  Sensory: Sensation is intact  Motor: Motor function is intact  Coordination: Coordination is intact  Deep Tendon Reflexes:      Reflex Scores:       Bicep reflexes are 2+ on the right side and 2+ on the left side  Patellar reflexes are 2+ on the right side and 2+ on the left side       Comments: Strength 5/5 in all 4 extremities    eplies positive going down to the R

## 2021-01-25 ENCOUNTER — TELEMEDICINE (OUTPATIENT)
Dept: FAMILY MEDICINE CLINIC | Facility: CLINIC | Age: 36
End: 2021-01-25
Payer: COMMERCIAL

## 2021-01-25 VITALS — TEMPERATURE: 98 F

## 2021-01-25 DIAGNOSIS — R09.89 SINUS COMPLAINT: Primary | ICD-10-CM

## 2021-01-25 DIAGNOSIS — J01.10 ACUTE FRONTAL SINUSITIS, RECURRENCE NOT SPECIFIED: ICD-10-CM

## 2021-01-25 PROCEDURE — 1036F TOBACCO NON-USER: CPT | Performed by: STUDENT IN AN ORGANIZED HEALTH CARE EDUCATION/TRAINING PROGRAM

## 2021-01-25 PROCEDURE — 99213 OFFICE O/P EST LOW 20 MIN: CPT | Performed by: STUDENT IN AN ORGANIZED HEALTH CARE EDUCATION/TRAINING PROGRAM

## 2021-01-25 PROCEDURE — 3725F SCREEN DEPRESSION PERFORMED: CPT | Performed by: STUDENT IN AN ORGANIZED HEALTH CARE EDUCATION/TRAINING PROGRAM

## 2021-01-25 RX ORDER — CEFUROXIME AXETIL 250 MG/1
250 TABLET ORAL EVERY 12 HOURS SCHEDULED
Qty: 14 TABLET | Refills: 0 | Status: SHIPPED | OUTPATIENT
Start: 2021-01-25 | End: 2021-02-01

## 2021-01-25 NOTE — PROGRESS NOTES
Virtual Regular Visit      Assessment/Plan:    Problem List Items Addressed This Visit        Respiratory    Acute frontal sinusitis      Other Visit Diagnoses     Sinus complaint    -  Primary    Relevant Medications    cefuroxime (CEFTIN) 250 mg tablet        Low suspicion for covid, will call with no improvement       Reason for visit is   Chief Complaint   Patient presents with    Virtual Brief Visit     sinus pressure, headache, facial pain, nasal congestion x4 days  pt states he gets recurrent sinus infections  taking advil cold and sinus without relief    Virtual Regular Visit        Encounter provider Leola Alfaro MD    Provider located [place] [facility] [place] [id number]  [place] [id number] [place] [address]  [phone]      Recent Visits  No visits were found meeting these conditions  Showing recent visits within past 7 days and meeting all other requirements     Today's Visits  Date Type Provider Dept   01/25/21 Telemedicine Leola Alfaro MD [facility] today's visits and meeting all other requirements     Future Appointments  No visits were found meeting these conditions  Showing future appointments within next 150 days and meeting all other requirements        The patient was identified by name and date of birth  Lonnie Sharma was informed that this is a telemedicine visit and that the visit is being conducted through [address] [place] [address] and patient was informed that this is not a secure, HIPAA-compliant platform  He agrees to proceed     My office door was closed  No one else was in the room  He acknowledged consent and understanding of privacy and security of the video platform  The patient has agreed to participate and understands they can discontinue the visit at any time  Patient is aware this is a billable service       Subjective  Lonnie Sharma is a 30 y o  male       No covid exposure, denies any covid symptoms     Sinusitis  This is a new problem  The current episode started in the past 7 days  The problem is unchanged  There has been no fever  The pain is moderate  Associated symptoms include congestion, headaches, sinus pressure and swollen glands  Pertinent negatives include no chills, coughing, diaphoresis, ear pain, hoarse voice, neck pain, shortness of breath, sneezing or sore throat  Past treatments include acetaminophen  The treatment provided mild relief  Past Medical History:   Diagnosis Date    GERD (gastroesophageal reflux disease)     History of stomach ulcers     Male infertility, unspecified     Varicocele       Past Surgical History:   Procedure Laterality Date    EYE SURGERY Left     NASAL SEPTUM SURGERY N/A     NASAL SEPTUM SURGERY      [place] ESOPHAGOGASTRODUODENOSCOPY TRANSORAL DIAGNOSTIC N/A 3/25/2019    Procedure: ESOPHAGOGASTRODUODENOSCOPY (EGD); Surgeon: Thelma Schultz DO;  Location: [facility];   Service: Gastroenterology       Current Outpatient Medications   Medication Sig Dispense Refill    esomeprazole (NexIUM) 40 MG capsule TAKE ONE CAPSULE BY MOUTH EVERY DAY 90 capsule 3    meclizine (ANTIVERT) 25 mg tablet Take 1 tablet (25 mg total) by mouth 3 (three) times a day as needed for dizziness 30 tablet 0    prochlorperazine (COMPAZINE) 5 mg tablet Take 1 tablet (5 mg total) by mouth every 6 (six) hours as needed for nausea or vomiting 30 tablet 0    azelastine (ASTELIN) 0 1 % nasal spray 1 spray into each nostril 2 (two) times a day Use in each nostril as directed (Patient not taking: Reported on 1/25/2021) 1 Bottle 0    cefuroxime (CEFTIN) 250 mg tablet Take 1 tablet (250 mg total) by mouth every 12 (twelve) hours for 7 days 14 tablet 0    fluticasone (FLONASE) 50 mcg/act nasal spray 2 sprays into each nostril daily (Patient not taking: Reported on 1/25/2021) 1 Bottle 1    methylPREDNISolone 4 MG tablet therapy pack Use as directed on package (Patient not taking: Reported on 9/16/2020) 21 each 0     No current facility-administered medications for this visit  No Known Allergies    Review of Systems   Constitutional: Negative for chills and diaphoresis  HENT: Positive for congestion and sinus pressure  Negative for ear pain, hoarse voice, sneezing and sore throat  Respiratory: Negative for cough and shortness of breath  Gastrointestinal: Negative for abdominal pain and diarrhea  Musculoskeletal: Negative for arthralgias, myalgias and neck pain  Neurological: Positive for headaches  Video Exam    Vitals:    01/25/21 1230   Temp: 98 °F (36 7 °C)       Physical Exam  Constitutional:       General: He is not in acute distress  Appearance: Normal appearance  He is not ill-appearing  HENT:      Head: Normocephalic and atraumatic  Nose: Congestion and rhinorrhea present  Eyes:      Extraocular Movements: Extraocular movements intact  Conjunctiva/sclera: Conjunctivae normal    Neck:      Musculoskeletal: Normal range of motion  Pulmonary:      Effort: Pulmonary effort is normal  No respiratory distress  Breath sounds: No wheezing  Neurological:      General: No focal deficit present  Mental Status: He is alert and oriented to person, place, and time  Mental status is at baseline  Psychiatric:         Mood and Affect: Mood normal          Behavior: Behavior normal           I spent 10 minutes directly with the patient during this visit      VIRTUAL VISIT DISCLAIMER    Maninder Alarcon acknowledges that he has consented to an online visit or consultation  He understands that the online visit is based solely on information provided by him, and that, in the absence of a face-to-face physical evaluation by the physician, the diagnosis he receives is both limited and provisional in terms of accuracy and completeness  This is not intended to replace a full medical face-to-face evaluation by the physician   Maninder Alarcon understands and accepts these terms

## 2021-02-10 ENCOUNTER — TELEPHONE (OUTPATIENT)
Dept: FAMILY MEDICINE CLINIC | Facility: CLINIC | Age: 36
End: 2021-02-10

## 2021-02-10 NOTE — TELEPHONE ENCOUNTER
Pt completed his abx but he still feel very congested, he has a headache and sinus pressure, he would like to know if he can try something else instead  Please advise

## 2021-02-11 ENCOUNTER — TELEPHONE (OUTPATIENT)
Dept: FAMILY MEDICINE CLINIC | Facility: CLINIC | Age: 36
End: 2021-02-11

## 2021-02-11 DIAGNOSIS — J01.10 ACUTE FRONTAL SINUSITIS, RECURRENCE NOT SPECIFIED: Primary | ICD-10-CM

## 2021-02-11 NOTE — TELEPHONE ENCOUNTER
T/c from pt, stating he spoke to someone from our office yesterday  (don't remember the name) and he is calling to find out the status of his new med  Pt completed his abx but he still feel very congested, he has a headache and sinus pressure, he would like to know if he can try something else instead       Please advise

## 2021-02-12 RX ORDER — AMOXICILLIN AND CLAVULANATE POTASSIUM 875; 125 MG/1; MG/1
1 TABLET, FILM COATED ORAL EVERY 12 HOURS SCHEDULED
Qty: 14 TABLET | Refills: 0 | Status: SHIPPED | OUTPATIENT
Start: 2021-02-12 | End: 2021-02-19

## 2021-02-12 NOTE — TELEPHONE ENCOUNTER
Patient does not want to come back in again and have to pay another copay  He said you were trying a different medication and he is still congested     Wondering if you could just call in a different medication

## 2021-04-05 DIAGNOSIS — Z23 ENCOUNTER FOR IMMUNIZATION: ICD-10-CM

## 2021-05-05 ENCOUNTER — OFFICE VISIT (OUTPATIENT)
Dept: FAMILY MEDICINE CLINIC | Facility: CLINIC | Age: 36
End: 2021-05-05
Payer: COMMERCIAL

## 2021-05-05 VITALS
TEMPERATURE: 98.3 F | WEIGHT: 229 LBS | HEART RATE: 77 BPM | DIASTOLIC BLOOD PRESSURE: 80 MMHG | BODY MASS INDEX: 29.39 KG/M2 | SYSTOLIC BLOOD PRESSURE: 116 MMHG | OXYGEN SATURATION: 97 % | HEIGHT: 74 IN

## 2021-05-05 DIAGNOSIS — F33.1 MODERATE EPISODE OF RECURRENT MAJOR DEPRESSIVE DISORDER (HCC): ICD-10-CM

## 2021-05-05 DIAGNOSIS — F41.9 ANXIETY: Primary | ICD-10-CM

## 2021-05-05 PROCEDURE — 1036F TOBACCO NON-USER: CPT | Performed by: FAMILY MEDICINE

## 2021-05-05 PROCEDURE — 3008F BODY MASS INDEX DOCD: CPT | Performed by: FAMILY MEDICINE

## 2021-05-05 PROCEDURE — 3725F SCREEN DEPRESSION PERFORMED: CPT | Performed by: FAMILY MEDICINE

## 2021-05-05 PROCEDURE — 99213 OFFICE O/P EST LOW 20 MIN: CPT | Performed by: FAMILY MEDICINE

## 2021-05-05 RX ORDER — VENLAFAXINE HYDROCHLORIDE 37.5 MG/1
37.5 CAPSULE, EXTENDED RELEASE ORAL
Qty: 30 CAPSULE | Refills: 1 | Status: SHIPPED | OUTPATIENT
Start: 2021-05-05 | End: 2021-05-26

## 2021-05-05 NOTE — PROGRESS NOTES
Assessment/Plan:         Problem List Items Addressed This Visit        Other    Anxiety - Primary    Moderate episode of recurrent major depressive disorder (HCC)    Relevant Medications    venlafaxine (EFFEXOR-XR) 37 5 mg 24 hr capsule    Other Relevant Orders    Ambulatory referral to [facility]    BMI 29 0-29 9,adult        BMI Counseling: Body mass index is 29 4 kg/m²  The BMI is above normal  Nutrition recommendations include decreasing portion sizes and encouraging healthy choices of fruits and vegetables  Exercise recommendations include moderate physical activity 150 minutes/week  No pharmacotherapy was ordered  Subjective:      Patient ID: Himanshu Calabrese is a 29 y o  male  Complaining worsening depression, he states that he has been battling this for several years, he states that it is worsening, he is also noting more anxiety  He denies any suicidal ideation  He states that he is sleeping well, he denies any anhedonia  The following portions of the patient's history were reviewed and updated as appropriate:   Past Medical History:  He has a past medical history of GERD (gastroesophageal reflux disease), History of stomach ulcers, and Male infertility, unspecified  ,  _______________________________________________________________________  Medical Problems:  does not have any pertinent problems on file ,  _______________________________________________________________________  Past Surgical History:   has a past surgical history that includes Nasal septum surgery (N/A); Eye surgery (Left); Nasal septum surgery; and pr esophagogastroduodenoscopy transoral diagnostic (N/A, 3/25/2019)  ,  _______________________________________________________________________  Family History:  family history includes Heart disease in his father; Hypothyroidism in his mother ,  _______________________________________________________________________  Social History:   reports that he has never smoked  He has never used smokeless tobacco  He reports current alcohol use  He reports that he does not use drugs  ,  _______________________________________________________________________  Allergies:  has No Known Allergies     _______________________________________________________________________  Current Outpatient Medications   Medication Sig Dispense Refill    esomeprazole (NexIUM) 40 MG capsule TAKE ONE CAPSULE BY MOUTH EVERY DAY 90 capsule 3    venlafaxine (EFFEXOR-XR) 37 5 mg 24 hr capsule Take 1 capsule (37 5 mg total) by mouth daily with breakfast 30 capsule 1     No current facility-administered medications for this visit       _______________________________________________________________________  Review of Systems   Constitutional: Negative for chills, fatigue and fever  HENT: Negative for congestion, ear pain, hearing loss, postnasal drip, rhinorrhea and sore throat  Eyes: Negative for pain and visual disturbance  Respiratory: Negative for chest tightness, shortness of breath and wheezing  Cardiovascular: Negative for chest pain and leg swelling  Gastrointestinal: Negative for abdominal distention, abdominal pain, constipation, diarrhea and vomiting  Endocrine: Negative for cold intolerance and heat intolerance  Genitourinary: Negative for difficulty urinating, frequency and urgency  Musculoskeletal: Negative for arthralgias and gait problem  Skin: Negative for color change  Neurological: Negative for dizziness, tremors, syncope, numbness and headaches  Hematological: Negative for adenopathy  Psychiatric/Behavioral: Positive for dysphoric mood  Negative for agitation, confusion and sleep disturbance  The patient is nervous/anxious            Objective:  Vitals:    05/05/21 1617   BP: 116/80   BP Location: Left arm   Patient Position: Sitting   Cuff Size: Large   Pulse: 77   Temp: 98 3 °F (36 8 °C)   SpO2: 97%   Weight: 104 kg (229 lb)   Height: 6' 2" (1 88 m)     Body mass index is 29 4 kg/m²  Physical Exam  Vitals signs and nursing note reviewed  Constitutional:       Appearance: He is well-developed  HENT:      Head: Normocephalic  Eyes:      General: No scleral icterus  Conjunctiva/sclera: Conjunctivae normal    Neck:      Musculoskeletal: Normal range of motion  Thyroid: No thyromegaly  Cardiovascular:      Rate and Rhythm: Normal rate and regular rhythm  Heart sounds: Normal heart sounds  No murmur  Pulmonary:      Effort: Pulmonary effort is normal  No respiratory distress  Breath sounds: Normal breath sounds  No wheezing  Abdominal:      General: Bowel sounds are normal  There is no distension  Palpations: Abdomen is soft  Tenderness: There is no abdominal tenderness  Musculoskeletal: Normal range of motion  General: No tenderness  Lymphadenopathy:      Cervical: No cervical adenopathy  Skin:     General: Skin is warm and dry  Coloration: Skin is not pale  Findings: No rash  Neurological:      Mental Status: He is alert and oriented to person, place, and time  Cranial Nerves: No cranial nerve deficit     Psychiatric:         Behavior: Behavior normal

## 2021-05-26 DIAGNOSIS — F41.9 ANXIETY: Primary | ICD-10-CM

## 2021-05-26 RX ORDER — FLUVOXAMINE MALEATE 25 MG
25 TABLET ORAL
Qty: 30 TABLET | Refills: 1 | Status: SHIPPED | OUTPATIENT
Start: 2021-05-26 | End: 2021-06-28 | Stop reason: SDUPTHER

## 2021-06-01 DIAGNOSIS — K29.00 OTHER ACUTE GASTRITIS WITHOUT HEMORRHAGE: ICD-10-CM

## 2021-06-01 RX ORDER — ESOMEPRAZOLE MAGNESIUM 40 MG/1
CAPSULE, DELAYED RELEASE ORAL
Qty: 90 CAPSULE | Refills: 3 | Status: SHIPPED | OUTPATIENT
Start: 2021-06-01 | End: 2022-03-10

## 2021-06-07 ENCOUNTER — SOCIAL WORK (OUTPATIENT)
Dept: BEHAVIORAL/MENTAL HEALTH CLINIC | Facility: CLINIC | Age: 36
End: 2021-06-07
Payer: COMMERCIAL

## 2021-06-07 DIAGNOSIS — F32.A DEPRESSION, UNSPECIFIED DEPRESSION TYPE: Primary | ICD-10-CM

## 2021-06-07 DIAGNOSIS — F41.9 ANXIETY: ICD-10-CM

## 2021-06-07 PROCEDURE — 90834 PSYTX W PT 45 MINUTES: CPT | Performed by: SOCIAL WORKER

## 2021-06-08 NOTE — PSYCH
4:30pm-5:15pm    Assessment/Plan: f/u in three weeks     There are no diagnoses linked to this encounter  Subjective: Therapist met w/pt who is a 29year old male for initial session due to symptoms of anxiety  He stated that he doesn't like how he expresses his anger and how he has a hard time controlling it  He stated that it is never physical but at times doesn't like his tone of voice and has guilt over how he responds to certain situations  Pt currently resides with his wife and 1year old daughter  He reports being very close to his family but having issues with his in laws  He stated that he knows that they are a big source of his anger  He also stated that his brother and his parenting is also another trigger for his anger  Therapist encouraged pt to work on reflecting more on different feelings than he is experiencing besides anger to determine what is going on for him emotionally that they can work through  Therapist educated pt on how at times it is easier to show anger than other feelings  Therapist and pt discussed techniques he can work on trying to implement when he feels himself getting to that point rather than projecting it onto others  Patient ID: Chani Amador is a 29 y o  male  HPI 45 minutes    Review of Systems      Objective: Pt appeared to be in a good mood and was easily engaged       Physical Exam  Pt denied any SI/HI/AH/VH

## 2021-06-23 ENCOUNTER — OFFICE VISIT (OUTPATIENT)
Dept: FAMILY MEDICINE CLINIC | Facility: CLINIC | Age: 36
End: 2021-06-23
Payer: COMMERCIAL

## 2021-06-23 VITALS
SYSTOLIC BLOOD PRESSURE: 124 MMHG | WEIGHT: 228 LBS | TEMPERATURE: 97.9 F | BODY MASS INDEX: 29.26 KG/M2 | RESPIRATION RATE: 16 BRPM | OXYGEN SATURATION: 98 % | HEART RATE: 90 BPM | HEIGHT: 74 IN | DIASTOLIC BLOOD PRESSURE: 86 MMHG

## 2021-06-23 DIAGNOSIS — H65.93 OME (OTITIS MEDIA WITH EFFUSION), BILATERAL: Primary | ICD-10-CM

## 2021-06-23 PROCEDURE — 99213 OFFICE O/P EST LOW 20 MIN: CPT | Performed by: FAMILY MEDICINE

## 2021-06-23 RX ORDER — FLUTICASONE PROPIONATE 50 MCG
1 SPRAY, SUSPENSION (ML) NASAL DAILY
Qty: 9.9 ML | Refills: 1 | Status: SHIPPED | OUTPATIENT
Start: 2021-06-23 | End: 2021-11-09 | Stop reason: SDUPTHER

## 2021-06-23 RX ORDER — AZITHROMYCIN 250 MG/1
TABLET, FILM COATED ORAL
Qty: 6 TABLET | Refills: 0 | Status: SHIPPED | OUTPATIENT
Start: 2021-06-23 | End: 2021-06-28 | Stop reason: ALTCHOICE

## 2021-06-23 NOTE — PROGRESS NOTES
BMI Counseling: Body mass index is 29 27 kg/m²  The BMI is above normal  Nutrition recommendations include decreasing portion sizes, decreasing fast food intake, consuming healthier snacks, limiting drinks that contain sugar, moderation in carbohydrate intake and reducing intake of saturated and trans fat  Exercise recommendations include moderate physical activity 150 minutes/week and exercising 3-5 times per week  Assessment/Plan:     Chronic Problems:  No problem-specific Assessment & Plan notes found for this encounter  Visit Diagnosis:  Diagnoses and all orders for this visit:    OME (otitis media with effusion), bilateral  Comments:  Discussed plan of care, may be component barotrauma secondary to flight, recommended OTC decongestant Flonase nasal spray Tylenol allergy full for headache Z-Pa  Orders:  -     azithromycin (ZITHROMAX) 250 mg tablet; Take 2 tablets today then 1 tablet daily x 4 days  -     fluticasone (FLONASE) 50 mcg/act nasal spray; 1 spray into each nostril daily          Subjective:    Patient ID: Justin Waller is a 29 y o  male  Nasal congestion , headache , slight st / pdn   delf treating with tea, tylenol, advil   Neg fever , chill, neg earache   Started upon return [place] [facility]   Has received vac covid x 2 , #2 3/27   Neg known ill contact     Headache   Associated symptoms include a sore throat  Pertinent negatives include no ear pain, fever, rhinorrhea or sinus pressure  The following portions of the patient's history were reviewed and updated as appropriate: allergies, current medications, past family history, past medical history, past social history, past surgical history and problem list     Review of Systems   Constitutional: Negative for fatigue and fever  HENT: Positive for congestion, postnasal drip and sore throat  Negative for dental problem, ear discharge, ear pain, rhinorrhea, sinus pressure and sneezing      Eyes: Negative  Respiratory: Negative  Musculoskeletal: Negative  Allergic/Immunologic: Negative  Neurological: Positive for headaches  Psychiatric/Behavioral: Negative  /86   Pulse 90   Temp 97 9 °F (36 6 °C)   Resp 16   Ht 6' 2" (1 88 m)   Wt 103 kg (228 lb)   SpO2 98%   BMI 29 27 kg/m²   Social History     Socioeconomic History    Marital status: /Civil Union     Spouse name: Not on file    Number of children: Not on file    Years of education: Not on file    Highest education level: Not on file   Occupational History    Not on file   Tobacco Use    Smoking status: Never Smoker    Smokeless tobacco: Never Used   Substance and Sexual Activity    Alcohol use: Yes     Comment: social-holidyas    Drug use: Never    Sexual activity: Not on file   Other Topics Concern    Not on file   Social History Narrative    Not on file     Social Determinants of Health     Financial Resource Strain:     Difficulty of Paying Living Expenses:    Food Insecurity:     Worried About Running Out of Food in the Last Year:     [address] in the Last Year:    Transportation Needs:     Lack of Transportation (Medical):      Lack of Transportation (Non-Medical):    Physical Activity:     Days of Exercise per Week:     Minutes of Exercise per Session:    Stress:     Feeling of Stress :    Social Connections:     Frequency of Communication with Friends and Family:     Frequency of Social Gatherings with Friends and Family:     Attends Holiness Services:     Active Member of Clubs or Organizations:     Attends Club or Organization Meetings:     Marital Status:    Intimate Partner Violence:     Fear of Current or Ex-Partner:     Emotionally Abused:     Physically Abused:     Sexually Abused:      Past Medical History:   Diagnosis Date    GERD (gastroesophageal reflux disease)     History of stomach ulcers     Male infertility, unspecified     Varicocele     Family History Problem Relation Age of Onset    Hypothyroidism Mother     Heart disease Father      Past Surgical History:   Procedure Laterality Date    EYE SURGERY Left     NASAL SEPTUM SURGERY N/A     NASAL SEPTUM SURGERY      [place] ESOPHAGOGASTRODUODENOSCOPY TRANSORAL DIAGNOSTIC N/A 3/25/2019    Procedure: ESOPHAGOGASTRODUODENOSCOPY (EGD); Surgeon: Flavio Capps DO;  Location: [facility]; Service: Gastroenterology       Current Outpatient Medications:     esomeprazole (NexIUM) 40 MG capsule, TAKE ONE CAPSULE BY MOUTH EVERY DAY, Disp: 90 capsule, Rfl: 3    fluvoxaMINE (LUVOX) 25 MG tablet, Take 1 tablet (25 mg total) by mouth daily at bedtime, Disp: 30 tablet, Rfl: 1    azithromycin (ZITHROMAX) 250 mg tablet, Take 2 tablets today then 1 tablet daily x 4 days, Disp: 6 tablet, Rfl: 0    fluticasone (FLONASE) 50 mcg/act nasal spray, 1 spray into each nostril daily, Disp: 9 9 mL, Rfl: 1    No Known Allergies       Lab Review   not applicable     Imaging: No results found  Objective:     Physical Exam  Constitutional:       General: He is not in acute distress  Appearance: He is not ill-appearing  HENT:      Head: Normocephalic and atraumatic  Right Ear: External ear normal  A middle ear effusion is present  Tympanic membrane is bulging  Left Ear: External ear normal  A middle ear effusion is present  Tympanic membrane is injected and bulging  Nose: Congestion present  Cardiovascular:      Rate and Rhythm: Normal rate  Pulmonary:      Effort: Pulmonary effort is normal    Musculoskeletal:         General: Normal range of motion  Lymphadenopathy:      Cervical: No cervical adenopathy  Skin:     General: Skin is warm and dry  Psychiatric:         Mood and Affect: Mood normal          Behavior: Behavior normal          Thought Content: Thought content normal          Judgment: Judgment normal            There are no Patient Instructions on file for this visit      Clark Reyes CRNP    Portions of the record may have been created with voice recognition software  Occasional wrong word or "sound a like" substitutions may have occurred due to the inherent limitations of voice recognition software  Read the chart carefully and recognize, using context, where substitutions have occurred

## 2021-06-28 ENCOUNTER — OFFICE VISIT (OUTPATIENT)
Dept: FAMILY MEDICINE CLINIC | Facility: CLINIC | Age: 36
End: 2021-06-28
Payer: COMMERCIAL

## 2021-06-28 ENCOUNTER — TELEPHONE (OUTPATIENT)
Dept: FAMILY MEDICINE CLINIC | Facility: CLINIC | Age: 36
End: 2021-06-28

## 2021-06-28 VITALS
HEIGHT: 74 IN | WEIGHT: 228 LBS | BODY MASS INDEX: 29.26 KG/M2 | DIASTOLIC BLOOD PRESSURE: 82 MMHG | SYSTOLIC BLOOD PRESSURE: 131 MMHG | RESPIRATION RATE: 18 BRPM | HEART RATE: 89 BPM | OXYGEN SATURATION: 98 %

## 2021-06-28 DIAGNOSIS — F41.9 ANXIETY: ICD-10-CM

## 2021-06-28 DIAGNOSIS — F33.1 MODERATE EPISODE OF RECURRENT MAJOR DEPRESSIVE DISORDER (HCC): Primary | ICD-10-CM

## 2021-06-28 PROBLEM — J01.10 ACUTE FRONTAL SINUSITIS: Status: RESOLVED | Noted: 2020-02-14 | Resolved: 2021-06-28

## 2021-06-28 PROBLEM — R09.81 HEAD CONGESTION: Status: RESOLVED | Noted: 2020-04-09 | Resolved: 2021-06-28

## 2021-06-28 PROCEDURE — 1036F TOBACCO NON-USER: CPT | Performed by: FAMILY MEDICINE

## 2021-06-28 PROCEDURE — 3008F BODY MASS INDEX DOCD: CPT | Performed by: FAMILY MEDICINE

## 2021-06-28 PROCEDURE — 99213 OFFICE O/P EST LOW 20 MIN: CPT | Performed by: FAMILY MEDICINE

## 2021-06-28 RX ORDER — FLUVOXAMINE MALEATE 50 MG/1
50 TABLET, COATED ORAL
Qty: 30 TABLET | Refills: 3 | Status: SHIPPED | OUTPATIENT
Start: 2021-06-28 | End: 2021-09-27 | Stop reason: SDUPTHER

## 2021-06-28 NOTE — ASSESSMENT & PLAN NOTE
Increase his Luvox to 50 mg p o  daily, he is to call in a month if he feels that in increase is warranted, we will see him back in 3 months, follow-up with behavioral health as scheduled

## 2021-06-28 NOTE — PROGRESS NOTES
Assessment/Plan:         Problem List Items Addressed This Visit        Other    Anxiety    Relevant Medications    fluvoxaMINE (LUVOX) 50 mg tablet    Moderate episode of recurrent major depressive disorder (HCC) - Primary       Increase his Luvox to 50 mg p o  daily, he is to call in a month if he feels that in increase is warranted, we will see him back in 3 months, follow-up with behavioral health as scheduled  Relevant Medications    fluvoxaMINE (LUVOX) 50 mg tablet            Subjective:      Patient ID: Billy Sandoval is a 29 y o  male  Comes in today for follow-up on his depression and anxiety, he states that he does not have any of the sexual side effects that he had with the venlafaxine  He however feels that this is not as affective for his mood  He is seeing behavioral health and does feel that this helps  The following portions of the patient's history were reviewed and updated as appropriate:   Past Medical History:  He has a past medical history of GERD (gastroesophageal reflux disease), History of stomach ulcers, and Male infertility, unspecified  ,  _______________________________________________________________________  Medical Problems:  does not have any pertinent problems on file ,  _______________________________________________________________________  Past Surgical History:   has a past surgical history that includes Nasal septum surgery (N/A); Eye surgery (Left); Nasal septum surgery; and pr esophagogastroduodenoscopy transoral diagnostic (N/A, 3/25/2019)  ,  _______________________________________________________________________  Family History:  family history includes Heart disease in his father; Hypothyroidism in his mother ,  _______________________________________________________________________  Social History:   reports that he has never smoked  He has never used smokeless tobacco  He reports current alcohol use   He reports that he does not use drugs ,  _______________________________________________________________________  Allergies:  has No Known Allergies     _______________________________________________________________________  Current Outpatient Medications   Medication Sig Dispense Refill    esomeprazole (NexIUM) 40 MG capsule TAKE ONE CAPSULE BY MOUTH EVERY DAY 90 capsule 3    fluticasone (FLONASE) 50 mcg/act nasal spray 1 spray into each nostril daily 9 9 mL 1    fluvoxaMINE (LUVOX) 50 mg tablet Take 1 tablet (50 mg total) by mouth daily at bedtime 30 tablet 3     No current facility-administered medications for this visit      _______________________________________________________________________  Review of Systems   Constitutional: Negative for chills, fatigue and fever  HENT: Negative for congestion, ear pain, hearing loss, postnasal drip, rhinorrhea and sore throat  Eyes: Negative for pain and visual disturbance  Respiratory: Negative for chest tightness, shortness of breath and wheezing  Cardiovascular: Negative for chest pain and leg swelling  Gastrointestinal: Negative for abdominal distention, abdominal pain, constipation, diarrhea and vomiting  Endocrine: Negative for cold intolerance and heat intolerance  Genitourinary: Negative for difficulty urinating, frequency and urgency  Musculoskeletal: Negative for arthralgias and gait problem  Skin: Negative for color change  Neurological: Negative for dizziness, tremors, syncope, numbness and headaches  Hematological: Negative for adenopathy  Psychiatric/Behavioral: Positive for dysphoric mood  Negative for agitation, confusion and sleep disturbance  The patient is not nervous/anxious  Objective:  Vitals:    06/28/21 1058   BP: 131/82   Pulse: 89   Resp: 18   SpO2: 98%   Weight: 103 kg (228 lb)   Height: 6' 2" (1 88 m)     Body mass index is 29 27 kg/m²  Physical Exam  Vitals and nursing note reviewed     Constitutional:       Appearance: He is well-developed  HENT:      Head: Normocephalic  Eyes:      General: No scleral icterus  Conjunctiva/sclera: Conjunctivae normal    Neck:      Thyroid: No thyromegaly  Cardiovascular:      Rate and Rhythm: Normal rate and regular rhythm  Heart sounds: Normal heart sounds  No murmur heard  Pulmonary:      Effort: Pulmonary effort is normal  No respiratory distress  Breath sounds: Normal breath sounds  No wheezing  Abdominal:      General: Bowel sounds are normal  There is no distension  Palpations: Abdomen is soft  Tenderness: There is no abdominal tenderness  Musculoskeletal:         General: No tenderness  Normal range of motion  Cervical back: Normal range of motion  Lymphadenopathy:      Cervical: No cervical adenopathy  Skin:     General: Skin is warm and dry  Coloration: Skin is not pale  Findings: No rash  Neurological:      Mental Status: He is alert and oriented to person, place, and time  Cranial Nerves: No cranial nerve deficit     Psychiatric:         Behavior: Behavior normal

## 2021-06-30 ENCOUNTER — SOCIAL WORK (OUTPATIENT)
Dept: BEHAVIORAL/MENTAL HEALTH CLINIC | Facility: CLINIC | Age: 36
End: 2021-06-30
Payer: COMMERCIAL

## 2021-06-30 DIAGNOSIS — F41.9 ANXIETY: Primary | ICD-10-CM

## 2021-06-30 DIAGNOSIS — F33.1 MODERATE EPISODE OF RECURRENT MAJOR DEPRESSIVE DISORDER (HCC): ICD-10-CM

## 2021-06-30 PROCEDURE — 90834 PSYTX W PT 45 MINUTES: CPT | Performed by: SOCIAL WORKER

## 2021-06-30 NOTE — PSYCH
7: 15am-8:00am    Assessment/Plan: f/u in one month     There are no diagnoses linked to this encounter  Subjective: Therapist met w/pt for individual session  Pt stated that he had a good vacation and since they were on vacation overall he felt his mood was stable  He stated that he didn't have any anger outbursts and/or issues with his temper  Pt stated that he still feels as if he doesn't have anger issues but more so a lot of unresolved conflict from the beginning of his relationship with is wife and her family  Pt began to open up about how his relationship started and the interactions that he had with her parents from the very beginning  He stated that he feels that the resentments started with in the first year of being with his wife  He stated that he bottled a lot of things up until the first time he "exploded "  Therapist allowed pt to vent most of the session about things he has experienced with his in laws and the hurt he continues to feel from them  Therapist and pt discussed ways to handle his feelings now and ways to work on beginning to let go of some of the resentments held  Patient ID: Lee Saldana is a 29 y o  male  HPI 45 minutes    Review of Systems      Objective: Pt appeared to be calm and easily engaged  He appeared open minded and receptive to feedback         Physical Exam  Pt denied any SI/HI/AH/VH

## 2021-07-27 ENCOUNTER — SOCIAL WORK (OUTPATIENT)
Dept: BEHAVIORAL/MENTAL HEALTH CLINIC | Facility: CLINIC | Age: 36
End: 2021-07-27
Payer: COMMERCIAL

## 2021-07-27 DIAGNOSIS — F33.1 MODERATE EPISODE OF RECURRENT MAJOR DEPRESSIVE DISORDER (HCC): Primary | ICD-10-CM

## 2021-07-27 PROCEDURE — 90834 PSYTX W PT 45 MINUTES: CPT | Performed by: SOCIAL WORKER

## 2021-07-27 NOTE — PSYCH
7: 15am-8:00am    Assessment/Plan: Pt will      There are no diagnoses linked to this encounter  Subjective: Therapist met w/pt for individual session  Pt stated that he hasn't lashed out in anger since last session  He reported at times he has been passive aggressive but overall he feels as if he is has been more in control w/his emotions  He stated that his wife has been struggling more with her family and he wants to be there for her but also has a hard time because of the resentments he has towards them  Pt continued to vent about different situations that they have been through  Therapist and pt discussed healthy ways to release some of the emotions he tends to experience especially around her family  Patient ID: Ben Beck is a 29 y o  male  HPI 45 minutes    Review of Systems      Objective: Pt appeared calm and easily engaged         Physical Exam  Pt denied any Si/HI/Ah/VH

## 2021-08-26 ENCOUNTER — SOCIAL WORK (OUTPATIENT)
Dept: BEHAVIORAL/MENTAL HEALTH CLINIC | Facility: CLINIC | Age: 36
End: 2021-08-26
Payer: COMMERCIAL

## 2021-08-26 DIAGNOSIS — F41.9 ANXIETY: Primary | ICD-10-CM

## 2021-08-26 DIAGNOSIS — F32.A DEPRESSION, UNSPECIFIED DEPRESSION TYPE: ICD-10-CM

## 2021-08-26 PROCEDURE — 90834 PSYTX W PT 45 MINUTES: CPT | Performed by: SOCIAL WORKER

## 2021-08-26 NOTE — PSYCH
7: 30am-8:15am    Assessment/Plan: f/u in a month     There are no diagnoses linked to this encounter  Subjective: Therapist met w/pt for individual session  Pt shared that his wife has gotten into therapy and he is grateful for this  He shared that he knows that she needs an additional outlet to process things especially due to pts feelings towards her family  Pt stated that he hasn't lashed out at anyone since last session but has continued to gain awareness of himself when he begins to get angry  Therapist and pt discussed different things that trigger him and how he handles those emotions  Discussion was held around knowing most of the time before that he will get triggered so prepares himself for being around certain people  Pt shared that he also feels the medication has been helping but that he doesn't like some of the side effects which he will talk to his PCP about  Patient ID: Kush Matamoros is a 29 y o  male  HPI 45 minutes    Review of Systems      Objective: Pt appeared to be calm and easily engaged         Physical Exam  Pt denied any Si/Hi/Ah/Vh

## 2021-09-27 ENCOUNTER — OFFICE VISIT (OUTPATIENT)
Dept: FAMILY MEDICINE CLINIC | Facility: CLINIC | Age: 36
End: 2021-09-27
Payer: COMMERCIAL

## 2021-09-27 VITALS
HEIGHT: 74 IN | SYSTOLIC BLOOD PRESSURE: 122 MMHG | OXYGEN SATURATION: 98 % | TEMPERATURE: 98.2 F | BODY MASS INDEX: 29.77 KG/M2 | DIASTOLIC BLOOD PRESSURE: 82 MMHG | WEIGHT: 232 LBS | HEART RATE: 77 BPM

## 2021-09-27 DIAGNOSIS — R53.81 MALAISE AND FATIGUE: ICD-10-CM

## 2021-09-27 DIAGNOSIS — Z13.6 SCREENING FOR CARDIOVASCULAR CONDITION: ICD-10-CM

## 2021-09-27 DIAGNOSIS — F33.1 MODERATE EPISODE OF RECURRENT MAJOR DEPRESSIVE DISORDER (HCC): ICD-10-CM

## 2021-09-27 DIAGNOSIS — F41.9 ANXIETY: Primary | ICD-10-CM

## 2021-09-27 DIAGNOSIS — Z23 NEED FOR IMMUNIZATION AGAINST INFLUENZA: ICD-10-CM

## 2021-09-27 DIAGNOSIS — R53.83 MALAISE AND FATIGUE: ICD-10-CM

## 2021-09-27 PROCEDURE — 90471 IMMUNIZATION ADMIN: CPT | Performed by: FAMILY MEDICINE

## 2021-09-27 PROCEDURE — 1036F TOBACCO NON-USER: CPT | Performed by: FAMILY MEDICINE

## 2021-09-27 PROCEDURE — 3008F BODY MASS INDEX DOCD: CPT | Performed by: FAMILY MEDICINE

## 2021-09-27 PROCEDURE — 99213 OFFICE O/P EST LOW 20 MIN: CPT | Performed by: FAMILY MEDICINE

## 2021-09-27 PROCEDURE — 90682 RIV4 VACC RECOMBINANT DNA IM: CPT | Performed by: FAMILY MEDICINE

## 2021-09-27 RX ORDER — FLUVOXAMINE MALEATE 50 MG/1
50 TABLET, COATED ORAL
Qty: 30 TABLET | Refills: 5 | Status: SHIPPED | OUTPATIENT
Start: 2021-09-27 | End: 2022-02-04 | Stop reason: SDUPTHER

## 2021-09-27 NOTE — PROGRESS NOTES
Assessment/Plan:         Problem List Items Addressed This Visit        Other    Anxiety - Primary    Relevant Medications    fluvoxaMINE (LUVOX) 50 mg tablet    Moderate episode of recurrent major depressive disorder (HCC)     Continue fluvoxamine, he is to let us know if he wishes to change the dose, otherwise will see him back in 6 months  Relevant Medications    fluvoxaMINE (LUVOX) 50 mg tablet      Other Visit Diagnoses     Malaise and fatigue        Relevant Orders    CBC    Comprehensive metabolic panel    TSH, 3rd generation    Screening for cardiovascular condition        Relevant Orders    Lipid panel    Need for immunization against influenza        Relevant Orders    influenza vaccine, quadrivalent, recombinant, PF, 0 5 mL, for patients 18 yr+ (FLUBLOK) (Completed)            Subjective:      Patient ID: Ja Age is a 29 y o  male  Patient comes in today for follow-up on his depression, he states that the fluvoxamine does seem to be helping, he does have an occasional episode sexual dysfunction with this  States that is not current enough to change the medication at this time  The following portions of the patient's history were reviewed and updated as appropriate:   Past Medical History:  He has a past medical history of GERD (gastroesophageal reflux disease), History of stomach ulcers, and Male infertility, unspecified  ,  _______________________________________________________________________  Medical Problems:  does not have any pertinent problems on file ,  _______________________________________________________________________  Past Surgical History:   has a past surgical history that includes Nasal septum surgery (N/A); Eye surgery (Left); Nasal septum surgery; and pr esophagogastroduodenoscopy transoral diagnostic (N/A, 3/25/2019)  ,  _______________________________________________________________________  Family History:  family history includes Heart disease in his father; Hypothyroidism in his mother ,  _______________________________________________________________________  Social History:   reports that he has never smoked  He has never used smokeless tobacco  He reports current alcohol use  He reports that he does not use drugs  ,  _______________________________________________________________________  Allergies:  has No Known Allergies     _______________________________________________________________________  Current Outpatient Medications   Medication Sig Dispense Refill    esomeprazole (NexIUM) 40 MG capsule TAKE ONE CAPSULE BY MOUTH EVERY DAY 90 capsule 3    fluticasone (FLONASE) 50 mcg/act nasal spray 1 spray into each nostril daily 9 9 mL 1    fluvoxaMINE (LUVOX) 50 mg tablet Take 1 tablet (50 mg total) by mouth daily at bedtime 30 tablet 5     No current facility-administered medications for this visit      _______________________________________________________________________  Review of Systems   Constitutional: Negative for chills, fatigue and fever  HENT: Negative for congestion, ear pain, hearing loss, postnasal drip, rhinorrhea and sore throat  Eyes: Negative for pain and visual disturbance  Respiratory: Negative for chest tightness, shortness of breath and wheezing  Cardiovascular: Negative for chest pain and leg swelling  Gastrointestinal: Negative for abdominal distention, abdominal pain, constipation, diarrhea and vomiting  Endocrine: Negative for cold intolerance and heat intolerance  Genitourinary: Negative for difficulty urinating, frequency and urgency  Musculoskeletal: Negative for arthralgias and gait problem  Skin: Negative for color change  Neurological: Negative for dizziness, tremors, syncope, numbness and headaches  Hematological: Negative for adenopathy  Psychiatric/Behavioral: Negative for agitation, confusion and sleep disturbance  The patient is not nervous/anxious            Objective:  Vitals:    09/27/21 0949 BP: 122/82   Pulse: 77   Temp: 98 2 °F (36 8 °C)   SpO2: 98%   Weight: 105 kg (232 lb)   Height: 6' 2" (1 88 m)     Body mass index is 29 79 kg/m²  Physical Exam  Vitals and nursing note reviewed  Constitutional:       Appearance: He is well-developed  HENT:      Head: Normocephalic  Eyes:      General: No scleral icterus  Conjunctiva/sclera: Conjunctivae normal    Neck:      Thyroid: No thyromegaly  Cardiovascular:      Rate and Rhythm: Normal rate and regular rhythm  Heart sounds: Normal heart sounds  No murmur heard  Pulmonary:      Effort: Pulmonary effort is normal  No respiratory distress  Breath sounds: Normal breath sounds  No wheezing  Abdominal:      General: Bowel sounds are normal  There is no distension  Palpations: Abdomen is soft  Tenderness: There is no abdominal tenderness  Musculoskeletal:         General: No tenderness  Normal range of motion  Cervical back: Normal range of motion  Lymphadenopathy:      Cervical: No cervical adenopathy  Skin:     General: Skin is warm and dry  Coloration: Skin is not pale  Findings: No rash  Neurological:      Mental Status: He is alert and oriented to person, place, and time  Cranial Nerves: No cranial nerve deficit     Psychiatric:         Behavior: Behavior normal

## 2021-09-27 NOTE — ASSESSMENT & PLAN NOTE
Continue fluvoxamine, he is to let us know if he wishes to change the dose, otherwise will see him back in 6 months

## 2021-09-29 ENCOUNTER — SOCIAL WORK (OUTPATIENT)
Dept: BEHAVIORAL/MENTAL HEALTH CLINIC | Facility: CLINIC | Age: 36
End: 2021-09-29
Payer: COMMERCIAL

## 2021-09-29 DIAGNOSIS — F33.1 MODERATE EPISODE OF RECURRENT MAJOR DEPRESSIVE DISORDER (HCC): Primary | ICD-10-CM

## 2021-09-29 PROCEDURE — 90834 PSYTX W PT 45 MINUTES: CPT | Performed by: SOCIAL WORKER

## 2021-09-30 ENCOUNTER — APPOINTMENT (OUTPATIENT)
Dept: LAB | Facility: CLINIC | Age: 36
End: 2021-09-30
Payer: COMMERCIAL

## 2021-09-30 DIAGNOSIS — R53.81 MALAISE AND FATIGUE: ICD-10-CM

## 2021-09-30 DIAGNOSIS — Z13.6 SCREENING FOR CARDIOVASCULAR CONDITION: ICD-10-CM

## 2021-09-30 DIAGNOSIS — R53.83 MALAISE AND FATIGUE: ICD-10-CM

## 2021-09-30 LAB
ALBUMIN SERPL BCP-MCNC: 4.2 G/DL (ref 3.5–5)
ALP SERPL-CCNC: 60 U/L (ref 46–116)
ALT SERPL W P-5'-P-CCNC: 59 U/L (ref 12–78)
ANION GAP SERPL CALCULATED.3IONS-SCNC: 0 MMOL/L (ref 4–13)
AST SERPL W P-5'-P-CCNC: 22 U/L (ref 5–45)
BILIRUB SERPL-MCNC: 0.45 MG/DL (ref 0.2–1)
BUN SERPL-MCNC: 15 MG/DL (ref 5–25)
CALCIUM SERPL-MCNC: 9.4 MG/DL (ref 8.3–10.1)
CHLORIDE SERPL-SCNC: 107 MMOL/L (ref 100–108)
CHOLEST SERPL-MCNC: 135 MG/DL (ref 50–200)
CO2 SERPL-SCNC: 31 MMOL/L (ref 21–32)
CREAT SERPL-MCNC: 0.85 MG/DL (ref 0.6–1.3)
ERYTHROCYTE [DISTWIDTH] IN BLOOD BY AUTOMATED COUNT: 13 % (ref 11.6–15.1)
GFR SERPL CREATININE-BSD FRML MDRD: 113 ML/MIN/1.73SQ M
GLUCOSE P FAST SERPL-MCNC: 88 MG/DL (ref 65–99)
HCT VFR BLD AUTO: 49 % (ref 36.5–49.3)
HDLC SERPL-MCNC: 38 MG/DL
HGB BLD-MCNC: 16 G/DL (ref 12–17)
LDLC SERPL CALC-MCNC: 77 MG/DL (ref 0–100)
MCH RBC QN AUTO: 28.7 PG (ref 26.8–34.3)
MCHC RBC AUTO-ENTMCNC: 32.7 G/DL (ref 31.4–37.4)
MCV RBC AUTO: 88 FL (ref 82–98)
NONHDLC SERPL-MCNC: 97 MG/DL
PLATELET # BLD AUTO: 223 THOUSANDS/UL (ref 149–390)
PMV BLD AUTO: 10.9 FL (ref 8.9–12.7)
POTASSIUM SERPL-SCNC: 3.9 MMOL/L (ref 3.5–5.3)
PROT SERPL-MCNC: 7.6 G/DL (ref 6.4–8.2)
RBC # BLD AUTO: 5.57 MILLION/UL (ref 3.88–5.62)
SODIUM SERPL-SCNC: 138 MMOL/L (ref 136–145)
TRIGL SERPL-MCNC: 102 MG/DL
TSH SERPL DL<=0.05 MIU/L-ACNC: 2.57 UIU/ML (ref 0.36–3.74)
WBC # BLD AUTO: 6 THOUSAND/UL (ref 4.31–10.16)

## 2021-09-30 PROCEDURE — 36415 COLL VENOUS BLD VENIPUNCTURE: CPT

## 2021-09-30 PROCEDURE — 80061 LIPID PANEL: CPT

## 2021-09-30 PROCEDURE — 84443 ASSAY THYROID STIM HORMONE: CPT

## 2021-09-30 PROCEDURE — 85027 COMPLETE CBC AUTOMATED: CPT

## 2021-09-30 PROCEDURE — 80053 COMPREHEN METABOLIC PANEL: CPT

## 2021-09-30 NOTE — PSYCH
7: 30am-8:15am    Assessment/Plan: f/u next month     There are no diagnoses linked to this encounter  Subjective: Therapist met w/pt for individual session  Pt expressed feeling more irritable than last session but has been able to manage his feelings more effectively  He shared that he feels the medication has been working and also feels that he has been able to implement more coping skills than he has been able to in the past   Therapist and pt discussed coping skills that have been helpful and ways pt can continue to work on managing his anger/frustration/anxiety  Pt expressed worry for his significant other but also trying to remind himself that she has a therapist of her own that she can lean on and that it would be helpful due to pt struggling w/understanind the connection his wife has with her family  Patient ID: Ra Smith is a 29 y o  male  HPI 45 minutes    Review of Systems      Objective: Pt appeared to be in a good mood and was easily engaged         Physical Exam  Pt denied any Si/HI/Ah/VH

## 2021-11-09 ENCOUNTER — TELEMEDICINE (OUTPATIENT)
Dept: FAMILY MEDICINE CLINIC | Facility: CLINIC | Age: 36
End: 2021-11-09
Payer: COMMERCIAL

## 2021-11-09 VITALS — BODY MASS INDEX: 29.77 KG/M2 | HEIGHT: 74 IN | WEIGHT: 232 LBS

## 2021-11-09 DIAGNOSIS — B34.9 VIRAL ILLNESS: Primary | ICD-10-CM

## 2021-11-09 PROCEDURE — 99213 OFFICE O/P EST LOW 20 MIN: CPT | Performed by: FAMILY MEDICINE

## 2021-11-09 PROCEDURE — U0005 INFEC AGEN DETEC AMPLI PROBE: HCPCS | Performed by: FAMILY MEDICINE

## 2021-11-09 PROCEDURE — U0003 INFECTIOUS AGENT DETECTION BY NUCLEIC ACID (DNA OR RNA); SEVERE ACUTE RESPIRATORY SYNDROME CORONAVIRUS 2 (SARS-COV-2) (CORONAVIRUS DISEASE [COVID-19]), AMPLIFIED PROBE TECHNIQUE, MAKING USE OF HIGH THROUGHPUT TECHNOLOGIES AS DESCRIBED BY CMS-2020-01-R: HCPCS | Performed by: FAMILY MEDICINE

## 2021-11-09 RX ORDER — FLUTICASONE PROPIONATE 50 MCG
1 SPRAY, SUSPENSION (ML) NASAL DAILY
Qty: 9.9 ML | Refills: 1 | Status: SHIPPED | OUTPATIENT
Start: 2021-11-09 | End: 2022-02-26

## 2021-11-10 LAB — SARS-COV-2 RNA RESP QL NAA+PROBE: NEGATIVE

## 2021-11-12 ENCOUNTER — TELEPHONE (OUTPATIENT)
Dept: FAMILY MEDICINE CLINIC | Facility: CLINIC | Age: 36
End: 2021-11-12

## 2021-11-12 DIAGNOSIS — J32.9 BACTERIAL SINUSITIS: Primary | ICD-10-CM

## 2021-11-12 DIAGNOSIS — B96.89 BACTERIAL SINUSITIS: Primary | ICD-10-CM

## 2021-11-12 RX ORDER — AMOXICILLIN AND CLAVULANATE POTASSIUM 875; 125 MG/1; MG/1
1 TABLET, FILM COATED ORAL EVERY 12 HOURS SCHEDULED
Qty: 20 TABLET | Refills: 0 | Status: SHIPPED | OUTPATIENT
Start: 2021-11-12 | End: 2021-11-22

## 2021-11-13 ENCOUNTER — OFFICE VISIT (OUTPATIENT)
Dept: FAMILY MEDICINE CLINIC | Facility: CLINIC | Age: 36
End: 2021-11-13
Payer: COMMERCIAL

## 2021-11-13 VITALS
DIASTOLIC BLOOD PRESSURE: 99 MMHG | SYSTOLIC BLOOD PRESSURE: 142 MMHG | HEART RATE: 99 BPM | WEIGHT: 233 LBS | TEMPERATURE: 97.3 F | OXYGEN SATURATION: 97 % | BODY MASS INDEX: 29.9 KG/M2 | HEIGHT: 74 IN

## 2021-11-13 DIAGNOSIS — J01.90 ACUTE SINUSITIS, RECURRENCE NOT SPECIFIED, UNSPECIFIED LOCATION: Primary | ICD-10-CM

## 2021-11-13 PROCEDURE — 1036F TOBACCO NON-USER: CPT | Performed by: FAMILY MEDICINE

## 2021-11-13 PROCEDURE — 3725F SCREEN DEPRESSION PERFORMED: CPT | Performed by: FAMILY MEDICINE

## 2021-11-13 PROCEDURE — 3008F BODY MASS INDEX DOCD: CPT | Performed by: FAMILY MEDICINE

## 2021-11-13 PROCEDURE — 99213 OFFICE O/P EST LOW 20 MIN: CPT | Performed by: FAMILY MEDICINE

## 2021-11-14 DIAGNOSIS — R42 DIZZINESS: ICD-10-CM

## 2021-11-15 RX ORDER — MECLIZINE HYDROCHLORIDE 25 MG/1
TABLET ORAL
Qty: 30 TABLET | Refills: 0 | Status: SHIPPED | OUTPATIENT
Start: 2021-11-15 | End: 2022-02-07

## 2021-12-01 ENCOUNTER — TELEPHONE (OUTPATIENT)
Dept: FAMILY MEDICINE CLINIC | Facility: CLINIC | Age: 36
End: 2021-12-01

## 2021-12-08 ENCOUNTER — SOCIAL WORK (OUTPATIENT)
Dept: BEHAVIORAL/MENTAL HEALTH CLINIC | Facility: CLINIC | Age: 36
End: 2021-12-08
Payer: COMMERCIAL

## 2021-12-08 DIAGNOSIS — F41.9 ANXIETY: ICD-10-CM

## 2021-12-08 DIAGNOSIS — F33.1 MODERATE EPISODE OF RECURRENT MAJOR DEPRESSIVE DISORDER (HCC): Primary | ICD-10-CM

## 2021-12-08 PROCEDURE — 90834 PSYTX W PT 45 MINUTES: CPT | Performed by: SOCIAL WORKER

## 2022-01-14 ENCOUNTER — SOCIAL WORK (OUTPATIENT)
Dept: BEHAVIORAL/MENTAL HEALTH CLINIC | Facility: CLINIC | Age: 37
End: 2022-01-14
Payer: COMMERCIAL

## 2022-01-14 DIAGNOSIS — F41.9 ANXIETY: ICD-10-CM

## 2022-01-14 DIAGNOSIS — F33.1 MODERATE EPISODE OF RECURRENT MAJOR DEPRESSIVE DISORDER (HCC): Primary | ICD-10-CM

## 2022-01-14 PROCEDURE — 90834 PSYTX W PT 45 MINUTES: CPT | Performed by: SOCIAL WORKER

## 2022-01-14 NOTE — PSYCH
7: 00am-7:45am    Assessment/Plan: f/u in a month     There are no diagnoses linked to this encounter  Subjective: Therapist met w/pt for individual session  Pt shared that in terms of family stress things have been better  He stated that his wife seems to be doing better emotionally and has improved with setting boundaries  He stated that right now he is stressed from work  He stated that right now is the busy time at his job and he feels more restless, irritable and on edge  He stated that he is trying to implement coping skills but his family notices how stressed he is because of how "short" he is  Therapist and pt discussed healthy ways to manage his stress  Patient ID: Catrina Swift is a 29 y o  male  HPI 45 minutes    Review of Systems      Objective: Pt appeared to be anxious and on edge  He was well dressed and groomed           Physical Exam  Pt denied any SI/HI/Ah/VH

## 2022-02-04 DIAGNOSIS — F41.9 ANXIETY: ICD-10-CM

## 2022-02-04 RX ORDER — FLUVOXAMINE MALEATE 100 MG
100 TABLET ORAL
Qty: 30 TABLET | Refills: 3 | Status: SHIPPED | OUTPATIENT
Start: 2022-02-04 | End: 2022-06-05

## 2022-02-07 DIAGNOSIS — R42 DIZZINESS: ICD-10-CM

## 2022-02-07 RX ORDER — MECLIZINE HYDROCHLORIDE 25 MG/1
TABLET ORAL
Qty: 30 TABLET | Refills: 0 | Status: SHIPPED | OUTPATIENT
Start: 2022-02-07

## 2022-02-16 ENCOUNTER — SOCIAL WORK (OUTPATIENT)
Dept: BEHAVIORAL/MENTAL HEALTH CLINIC | Facility: CLINIC | Age: 37
End: 2022-02-16
Payer: COMMERCIAL

## 2022-02-16 DIAGNOSIS — F33.1 MODERATE EPISODE OF RECURRENT MAJOR DEPRESSIVE DISORDER (HCC): Primary | ICD-10-CM

## 2022-02-16 PROCEDURE — 90834 PSYTX W PT 45 MINUTES: CPT | Performed by: SOCIAL WORKER

## 2022-02-17 NOTE — PSYCH
7: 30am-8:15am    Assessment/Plan: f/u in a month     There are no diagnoses linked to this encounter  Subjective: Therapist met w/pt for individual session  Pt stated after session last time he decided to reach out to his PCP and discuss an increase to his medication  Pt stated his medication has been increased and feels that that has been helpful  He stated that no matter what he would do he felt this irritability and felt so out of control emotionally  He stated he feels as if he is able to manage his symptoms more effectively and his job has also slowed down which has been helpful  Patient ID: Darvin Hightower is a 29 y o  male  HPI 45 minutes    Review of Systems      Objective: Pt appeared to be in a good mood  He was alert and oriented x3         Physical Exam  Pt denied any SI/HI/Ah/VH

## 2022-02-26 DIAGNOSIS — B34.9 VIRAL ILLNESS: ICD-10-CM

## 2022-02-26 RX ORDER — FLUTICASONE PROPIONATE 50 MCG
SPRAY, SUSPENSION (ML) NASAL
Qty: 16 G | Refills: 1 | Status: SHIPPED | OUTPATIENT
Start: 2022-02-26

## 2022-02-28 ENCOUNTER — OFFICE VISIT (OUTPATIENT)
Dept: FAMILY MEDICINE CLINIC | Facility: CLINIC | Age: 37
End: 2022-02-28
Payer: COMMERCIAL

## 2022-02-28 ENCOUNTER — HOSPITAL ENCOUNTER (OUTPATIENT)
Dept: RADIOLOGY | Facility: HOSPITAL | Age: 37
Discharge: HOME | End: 2022-02-28
Payer: COMMERCIAL

## 2022-02-28 VITALS
BODY MASS INDEX: 31.06 KG/M2 | WEIGHT: 242 LBS | OXYGEN SATURATION: 97 % | DIASTOLIC BLOOD PRESSURE: 90 MMHG | TEMPERATURE: 97.6 F | HEIGHT: 74 IN | HEART RATE: 98 BPM | SYSTOLIC BLOOD PRESSURE: 138 MMHG

## 2022-02-28 DIAGNOSIS — M25.511 ACUTE PAIN OF RIGHT SHOULDER: Primary | ICD-10-CM

## 2022-02-28 DIAGNOSIS — M25.511 ACUTE PAIN OF RIGHT SHOULDER: ICD-10-CM

## 2022-02-28 PROCEDURE — 73030 X-RAY EXAM OF SHOULDER: CPT

## 2022-02-28 PROCEDURE — 99213 OFFICE O/P EST LOW 20 MIN: CPT | Performed by: FAMILY MEDICINE

## 2022-02-28 RX ORDER — NAPROXEN SODIUM 550 MG/1
550 TABLET ORAL 2 TIMES DAILY WITH MEALS
Qty: 30 TABLET | Refills: 0 | Status: SHIPPED | OUTPATIENT
Start: 2022-02-28

## 2022-02-28 NOTE — PROGRESS NOTES
BMI Counseling: Body mass index is 31 07 kg/m²  The BMI is above normal  Nutrition recommendations include decreasing portion sizes, decreasing fast food intake, limiting drinks that contain sugar, moderation in carbohydrate intake, increasing intake of lean protein, reducing intake of saturated and trans fat and reducing intake of cholesterol  Exercise recommendations include moderate physical activity 150 minutes/week and exercising 3-5 times per week  No pharmacotherapy was ordered  Rationale for BMI follow-up plan is due to patient being overweight or obese  Assessment/Plan:     Chronic Problems:  No problem-specific Assessment & Plan notes found for this encounter  Visit Diagnosis:  Diagnoses and all orders for this visit:    Acute pain of right shoulder  -     naproxen sodium (ANAPROX) 550 mg tablet; Take 1 tablet (550 mg total) by mouth 2 (two) times a day with meals  -     XR shoulder 2+ vw right; Future      Discussed plan of care with patient obtain x-ray right shoulder, discussed home exercise program, recommended ice, NSAIDs with food follow-up 10 days    Subjective:    Patient ID: Courtney Godinez is a 29 y o  male  Dog , Danish arzate   I have been sleeping on the floor   Last week started with right shoulder pan   Non radiating   Denies any known injury, negative fall  Difficulty moving right shoulder certain planes  Negative neck elbow complaints  Self treating with OTC cream    Works at the [employer]   Exercise none       The following portions of the patient's history were reviewed and updated as appropriate: allergies, current medications, past family history, past medical history, past social history, past surgical history and problem list     Review of Systems   Constitutional: Negative for appetite change, chills, fever and unexpected weight change     HENT: Negative for congestion, dental problem, ear pain, hearing loss, postnasal drip, rhinorrhea, sinus pressure, sinus pain, sneezing, sore throat, tinnitus and voice change  Eyes: Negative for visual disturbance  Respiratory: Negative for apnea, cough, chest tightness and shortness of breath  Cardiovascular: Negative for chest pain, palpitations and leg swelling  Gastrointestinal: Negative for abdominal pain, blood in stool, constipation, diarrhea, nausea and vomiting  Endocrine: Negative for cold intolerance, heat intolerance, polydipsia, polyphagia and polyuria  Genitourinary: Negative for decreased urine volume, difficulty urinating, dysuria, frequency and hematuria  Musculoskeletal: Positive for arthralgias (Right shoulder)  Negative for back pain, gait problem, joint swelling and myalgias  Skin: Negative for color change, rash and wound  Allergic/Immunologic: Negative for environmental allergies and food allergies  Neurological: Negative for dizziness, syncope, weakness, light-headedness, numbness and headaches  Hematological: Negative for adenopathy  Does not bruise/bleed easily  Psychiatric/Behavioral: Negative for sleep disturbance and suicidal ideas  The patient is not nervous/anxious            /90   Pulse 98   Temp 97 6 °F (36 4 °C)   Ht 6' 2" (1 88 m)   Wt 110 kg (242 lb)   SpO2 97%   BMI 31 07 kg/m²   Social History     Socioeconomic History    Marital status: /Civil Union     Spouse name: Not on file    Number of children: Not on file    Years of education: Not on file    Highest education level: Not on file   Occupational History    Not on file   Tobacco Use    Smoking status: Never Smoker    Smokeless tobacco: Never Used   Vaping Use    Vaping Use: Never used   Substance and Sexual Activity    Alcohol use: Yes     Comment: social-holidyas    Drug use: Never    Sexual activity: Not on file   Other Topics Concern    Not on file   Social History Narrative    Not on file     Social Determinants of Health     Financial Resource Strain: Not on file   Food Insecurity: Not on file   Transportation Needs: Not on file   Physical Activity: Not on file   Stress: Not on file   Social Connections: Not on file   Intimate Partner Violence: Not on file   Housing Stability: Not on file     Past Medical History:   Diagnosis Date    GERD (gastroesophageal reflux disease)     History of stomach ulcers     Male infertility, unspecified     Varicocele     Family History   Problem Relation Age of Onset    Hypothyroidism Mother     Heart disease Father      Past Surgical History:   Procedure Laterality Date    EYE SURGERY Left     NASAL SEPTUM SURGERY N/A     NASAL SEPTUM SURGERY      MI ESOPHAGOGASTRODUODENOSCOPY TRANSORAL DIAGNOSTIC N/A 3/25/2019    Procedure: ESOPHAGOGASTRODUODENOSCOPY (EGD); Surgeon: Darin Nicole DO;  Location: [facility];   Service: Gastroenterology       Current Outpatient Medications:     esomeprazole (NexIUM) 40 MG capsule, TAKE ONE CAPSULE BY MOUTH EVERY DAY, Disp: 90 capsule, Rfl: 3    fluticasone (FLONASE) 50 mcg/act nasal spray, USE 1 SPRAY IN EACH NOSTRIL ONCE A DAY, Disp: 16 g, Rfl: 1    fluvoxaMINE (LUVOX) 100 mg tablet, Take 1 tablet (100 mg total) by mouth daily at bedtime, Disp: 30 tablet, Rfl: 3    meclizine (ANTIVERT) 25 mg tablet, TAKE ONE TABLET BY MOUTH THREE TIMES A DAY AS NEEDED FOR DIZINESS, Disp: 30 tablet, Rfl: 0    naproxen sodium (ANAPROX) 550 mg tablet, Take 1 tablet (550 mg total) by mouth 2 (two) times a day with meals, Disp: 30 tablet, Rfl: 0    No Known Allergies       Lab Review   Telemedicine on 11/09/2021   Component Date Value    SARS-CoV-2 11/09/2021 Negative    Appointment on 09/30/2021   Component Date Value    WBC 09/30/2021 6 00     RBC 09/30/2021 5 57     Hemoglobin 09/30/2021 16 0     Hematocrit 09/30/2021 49 0     MCV 09/30/2021 88     MCH 09/30/2021 28 7     MCHC 09/30/2021 32 7     RDW 09/30/2021 13 0     Platelets 85/92/8989 223     MPV 09/30/2021 10 9     Sodium 09/30/2021 138     Potassium 09/30/2021 3 9  Chloride 09/30/2021 107     CO2 09/30/2021 31     ANION GAP 09/30/2021 0*    BUN 09/30/2021 15     Creatinine 09/30/2021 0 85     Glucose, Fasting 09/30/2021 88     Calcium 09/30/2021 9 4     AST 09/30/2021 22     ALT 09/30/2021 59     Alkaline Phosphatase 09/30/2021 60     Total Protein 09/30/2021 7 6     Albumin 09/30/2021 4 2     Total Bilirubin 09/30/2021 0 45     eGFR 09/30/2021 113     Cholesterol 09/30/2021 135     Triglycerides 09/30/2021 102     HDL, Direct 09/30/2021 38*    LDL Calculated 09/30/2021 77     Non-HDL-Chol (CHOL-HDL) 09/30/2021 97     TSH 3RD GENERATON 09/30/2021 2 570         Imaging: No results found  Objective:     Physical Exam  Constitutional:       General: He is not in acute distress  Appearance: He is not ill-appearing or toxic-appearing  HENT:      Head: Normocephalic and atraumatic  Eyes:      Conjunctiva/sclera: Conjunctivae normal    Cardiovascular:      Rate and Rhythm: Normal rate  Pulses: Normal pulses  Pulmonary:      Effort: Pulmonary effort is normal    Musculoskeletal:         General: Tenderness present  Right shoulder: Tenderness present  Decreased range of motion  Neurological:      Mental Status: He is oriented to person, place, and time  There are no Patient Instructions on file for this visit  DANNI Ferrell    Portions of the record may have been created with voice recognition software  Occasional wrong word or "sound a like" substitutions may have occurred due to the inherent limitations of voice recognition software  Read the chart carefully and recognize, using context, where substitutions have occurred

## 2022-03-10 DIAGNOSIS — K29.00 OTHER ACUTE GASTRITIS WITHOUT HEMORRHAGE: ICD-10-CM

## 2022-03-10 RX ORDER — ESOMEPRAZOLE MAGNESIUM 40 MG/1
CAPSULE, DELAYED RELEASE ORAL
Qty: 90 CAPSULE | Refills: 3 | Status: SHIPPED | OUTPATIENT
Start: 2022-03-10 | End: 2022-06-07

## 2022-03-28 ENCOUNTER — OFFICE VISIT (OUTPATIENT)
Dept: FAMILY MEDICINE CLINIC | Facility: CLINIC | Age: 37
End: 2022-03-28
Payer: COMMERCIAL

## 2022-03-28 VITALS
BODY MASS INDEX: 30.29 KG/M2 | DIASTOLIC BLOOD PRESSURE: 100 MMHG | HEART RATE: 95 BPM | RESPIRATION RATE: 18 BRPM | SYSTOLIC BLOOD PRESSURE: 140 MMHG | OXYGEN SATURATION: 98 % | WEIGHT: 236 LBS | HEIGHT: 74 IN | TEMPERATURE: 97.3 F

## 2022-03-28 DIAGNOSIS — F33.1 MODERATE EPISODE OF RECURRENT MAJOR DEPRESSIVE DISORDER (HCC): ICD-10-CM

## 2022-03-28 DIAGNOSIS — Z00.00 ANNUAL PHYSICAL EXAM: Primary | ICD-10-CM

## 2022-03-28 DIAGNOSIS — G47.30 SLEEP DISORDER BREATHING: ICD-10-CM

## 2022-03-28 PROBLEM — J01.90 ACUTE SINUSITIS: Status: RESOLVED | Noted: 2020-02-14 | Resolved: 2022-03-28

## 2022-03-28 PROCEDURE — 99395 PREV VISIT EST AGE 18-39: CPT | Performed by: FAMILY MEDICINE

## 2022-03-28 PROCEDURE — 3008F BODY MASS INDEX DOCD: CPT | Performed by: FAMILY MEDICINE

## 2022-03-28 PROCEDURE — 1036F TOBACCO NON-USER: CPT | Performed by: FAMILY MEDICINE

## 2022-03-28 NOTE — PATIENT INSTRUCTIONS

## 2022-03-28 NOTE — PROGRESS NOTES
[facility] [address] [facility]    NAME: Yahir Patel  AGE: 29 y o  SEX: male  : 1985     DATE: 3/28/2022     Assessment and Plan:     Problem List Items Addressed This Visit        Other    Moderate episode of recurrent major depressive disorder ([address] )      Other Visit Diagnoses     Annual physical exam    -  Primary    Sleep disorder breathing        Relevant Orders    Ambulatory Referral to Pulmonology          Immunizations and preventive care screenings were discussed with patient today  Appropriate education was printed on patient's after visit summary  Counseling:  · Dental Health: discussed importance of regular tooth brushing, flossing, and dental visits  Return in 1 year (on 3/28/2023)  Chief Complaint:     Chief Complaint   Patient presents with    Physical Exam      History of Present Illness:     Adult Annual Physical   Patient here for a comprehensive physical exam  The patient reports no problems  Diet and Physical Activity  · Diet/Nutrition: well balanced diet  · Exercise: moderate cardiovascular exercise  Depression Screening  PHQ-2/9 Depression Screening         General Health  · Sleep: sleeps well  · Hearing: normal - bilateral   · Vision: no vision problems  · Dental: regular dental visits   Health  · History of STDs?: no      Review of Systems:     Review of Systems   Constitutional: Negative for chills, fatigue and fever  HENT: Negative for congestion, ear pain, hearing loss, postnasal drip, rhinorrhea and sore throat  Eyes: Negative for pain and visual disturbance  Respiratory: Negative for chest tightness, shortness of breath and wheezing  Cardiovascular: Negative for chest pain and leg swelling  Gastrointestinal: Negative for abdominal distention, abdominal pain, constipation, diarrhea and vomiting     Endocrine: Negative for cold intolerance and heat intolerance  Genitourinary: Negative for difficulty urinating, frequency and urgency  Musculoskeletal: Negative for arthralgias and gait problem  Skin: Negative for color change  Neurological: Negative for dizziness, tremors, syncope, numbness and headaches  Hematological: Negative for adenopathy  Psychiatric/Behavioral: Positive for sleep disturbance  Negative for agitation and confusion  The patient is not nervous/anxious  Past Medical History:     Past Medical History:   Diagnosis Date    GERD (gastroesophageal reflux disease)     History of stomach ulcers     Male infertility, unspecified     Varicocele      Past Surgical History:     Past Surgical History:   Procedure Laterality Date    EYE SURGERY Left     NASAL SEPTUM SURGERY N/A     NASAL SEPTUM SURGERY      NE ESOPHAGOGASTRODUODENOSCOPY TRANSORAL DIAGNOSTIC N/A 3/25/2019    Procedure: ESOPHAGOGASTRODUODENOSCOPY (EGD); Surgeon: Jan Weeks DO;  Location: [facility];   Service: Gastroenterology      Social History:     Social History     Socioeconomic History    Marital status: /Civil Union     Spouse name: None    Number of children: None    Years of education: None    Highest education level: None   Occupational History    None   Tobacco Use    Smoking status: Never Smoker    Smokeless tobacco: Never Used   Vaping Use    Vaping Use: Never used   Substance and Sexual Activity    Alcohol use: Yes     Comment: social-holidyas    Drug use: Never    Sexual activity: None   Other Topics Concern    None   Social History Narrative    None     Social Determinants of Health     Financial Resource Strain: Not on file   Food Insecurity: Not on file   Transportation Needs: Not on file   Physical Activity: Not on file   Stress: Not on file   Social Connections: Not on file   Intimate Partner Violence: Not on file   Housing Stability: Not on file      Family History:     Family History   Problem Relation Age of Onset    Hypothyroidism Mother     Heart disease Father       Current Medications:     Current Outpatient Medications   Medication Sig Dispense Refill    esomeprazole (NexIUM) 40 MG capsule TAKE ONE CAPSULE BY MOUTH EVERY DAY 90 capsule 3    fluticasone (FLONASE) 50 mcg/act nasal spray USE 1 SPRAY IN EACH NOSTRIL ONCE A DAY 16 g 1    fluvoxaMINE (LUVOX) 100 mg tablet Take 1 tablet (100 mg total) by mouth daily at bedtime 30 tablet 3    meclizine (ANTIVERT) 25 mg tablet TAKE ONE TABLET BY MOUTH THREE TIMES A DAY AS NEEDED FOR DIZINESS 30 tablet 0    naproxen sodium (ANAPROX) 550 mg tablet Take 1 tablet (550 mg total) by mouth 2 (two) times a day with meals 30 tablet 0     No current facility-administered medications for this visit  Allergies:     No Known Allergies   Physical Exam:     /100   Pulse 95   Temp (!) 97 3 °F (36 3 °C) (Tympanic)   Resp 18   Ht 6' 2" (1 88 m)   Wt 107 kg (236 lb)   SpO2 98%   BMI 30 30 kg/m²     Physical Exam  Constitutional:       Appearance: He is well-developed  HENT:      Head: Normocephalic  Right Ear: External ear normal       Left Ear: External ear normal       Nose: Nose normal    Eyes:      Conjunctiva/sclera: Conjunctivae normal       Pupils: Pupils are equal, round, and reactive to light  Neck:      Thyroid: No thyromegaly  Cardiovascular:      Rate and Rhythm: Normal rate and regular rhythm  Heart sounds: Normal heart sounds  Pulmonary:      Effort: Pulmonary effort is normal       Breath sounds: Normal breath sounds  Abdominal:      General: Bowel sounds are normal       Palpations: Abdomen is soft  Musculoskeletal:         General: Normal range of motion  Cervical back: Normal range of motion  Skin:     General: Skin is warm and dry  Neurological:      Mental Status: He is alert and oriented to person, place, and time     Psychiatric:         Behavior: Behavior normal           DO Emanuel Trejo [id number] [facility]

## 2022-03-30 ENCOUNTER — SOCIAL WORK (OUTPATIENT)
Dept: BEHAVIORAL/MENTAL HEALTH CLINIC | Facility: CLINIC | Age: 37
End: 2022-03-30
Payer: COMMERCIAL

## 2022-03-30 DIAGNOSIS — F33.1 MODERATE EPISODE OF RECURRENT MAJOR DEPRESSIVE DISORDER (HCC): Primary | ICD-10-CM

## 2022-03-30 PROCEDURE — 90834 PSYTX W PT 45 MINUTES: CPT | Performed by: SOCIAL WORKER

## 2022-03-31 NOTE — PSYCH
7: 30am-8:15am    Assessment/Plan: f/u in one month     There are no diagnoses linked to this encounter  Subjective: Therapist met w/pt for individual session  Pt shared that it is still the stressful time at his job but he knows that soon it will slow down and things will be a little better  He shared that he is still on edge and irritable due to increase stress but due to the medication increase he feels he is able to manage it more effectively  Pt shared that there continues to be family issues w/his wife's side but he is trying to focus on things within his control and not so much around the dysfunction  Patient ID: Nikos Huffman is a 29 y o  male  HPI 45 minutes    Review of Systems      Objective: Pt appeared to be calm and easily engaged         Physical Exam  Pt denied any SI/HI/Ah/VH

## 2022-04-29 ENCOUNTER — SOCIAL WORK (OUTPATIENT)
Dept: BEHAVIORAL/MENTAL HEALTH CLINIC | Facility: CLINIC | Age: 37
End: 2022-04-29
Payer: COMMERCIAL

## 2022-04-29 DIAGNOSIS — F33.1 MODERATE EPISODE OF RECURRENT MAJOR DEPRESSIVE DISORDER (HCC): Primary | ICD-10-CM

## 2022-04-29 PROCEDURE — 90834 PSYTX W PT 45 MINUTES: CPT | Performed by: SOCIAL WORKER

## 2022-05-02 NOTE — PSYCH
7: 30am-8:15am    Assessment/Plan: f/u in a month     There are no diagnoses linked to this encounter  Subjective: Therapist met w/pt for individual session  Pt shared that work hasn't been as stressful  He stated that overall he has been managing his anger  He stated that he got into an argument with his significant other over family issues and then walked away  Pt stated he feels himself getting more irritated and annoyed and taking time to ground himself  He stated in terms of how he feels about himself it isn't the best it could be  Pt stated he has had a hard time keeping up with exercise and healthy eating  Therapist and pt discussed SMART goals he wants to achieve  Pt stated he bought a treadmill so he knows he wants incorporate that first and then focus on the healthy eating  Patient ID: Catherine Pool is a 40 y o  male  HPI 45 minutes    Review of Systems      Objective: Pt appeared to be calm and easily engaged         Physical Exam  Pt denied any SI/HI/Ah/Vh

## 2022-05-10 ENCOUNTER — OFFICE VISIT (OUTPATIENT)
Dept: FAMILY MEDICINE CLINIC | Facility: CLINIC | Age: 37
End: 2022-05-10
Payer: COMMERCIAL

## 2022-05-10 VITALS
DIASTOLIC BLOOD PRESSURE: 80 MMHG | BODY MASS INDEX: 30.54 KG/M2 | HEART RATE: 98 BPM | OXYGEN SATURATION: 98 % | SYSTOLIC BLOOD PRESSURE: 142 MMHG | WEIGHT: 238 LBS | HEIGHT: 74 IN

## 2022-05-10 DIAGNOSIS — J32.1 FRONTAL SINUSITIS, UNSPECIFIED CHRONICITY: Primary | ICD-10-CM

## 2022-05-10 PROCEDURE — 1036F TOBACCO NON-USER: CPT | Performed by: FAMILY MEDICINE

## 2022-05-10 PROCEDURE — 99213 OFFICE O/P EST LOW 20 MIN: CPT | Performed by: FAMILY MEDICINE

## 2022-05-10 PROCEDURE — 3008F BODY MASS INDEX DOCD: CPT | Performed by: FAMILY MEDICINE

## 2022-05-10 RX ORDER — AMOXICILLIN 500 MG/1
500 CAPSULE ORAL EVERY 8 HOURS SCHEDULED
Qty: 30 CAPSULE | Refills: 0 | Status: SHIPPED | OUTPATIENT
Start: 2022-05-10 | End: 2022-05-20

## 2022-05-10 NOTE — PROGRESS NOTES
Assessment/Plan:     Chronic Problems:  No problem-specific Assessment & Plan notes found for this encounter  Visit Diagnosis:  Diagnoses and all orders for this visit:    Frontal sinusitis, unspecified chronicity  -     amoxicillin (AMOXIL) 500 mg capsule; Take 1 capsule (500 mg total) by mouth every 8 (eight) hours for 10 days      Sinusitis  discussed plan of care , rec preventative measures , if past issues with seasonal allergens rec aggressive treatment , norah pot , nasal rinse , if no history of htn / cad  consider otc  decongestant for short term treatment , muccinex otc  consider the use of nasal steroid short term    Subjective:    Patient ID: Hemal Grimes is a 40 y o  male  Dealing with sinus congestion , allergies for the past weeks ,   St with swallowing ,   Neg fever , chill, neg covid , sinus pressure pain frontal maxillary Liyah Mends negative earache, negative shortness of breath difficulty breathing  muccinex d with some relief along with nasal lavage   Neg ill contact   A nonsmoker      The following portions of the patient's history were reviewed and updated as appropriate: allergies, current medications, past family history, past medical history, past social history, past surgical history and problem list     Review of Systems   Constitutional: Negative for appetite change, chills, fever and unexpected weight change  HENT: Positive for congestion, ear pain, postnasal drip, sinus pressure, sinus pain and sore throat  Negative for dental problem, hearing loss, rhinorrhea, sneezing, tinnitus and voice change  Eyes: Negative for visual disturbance  Respiratory: Positive for cough  Negative for apnea, chest tightness, shortness of breath and wheezing  Cardiovascular: Negative for chest pain, palpitations and leg swelling  Gastrointestinal: Negative for abdominal pain, blood in stool, constipation, diarrhea, nausea and vomiting     Endocrine: Negative for cold intolerance, heat intolerance, polydipsia, polyphagia and polyuria  Genitourinary: Negative for decreased urine volume, difficulty urinating, dysuria, frequency and hematuria  Musculoskeletal: Negative for arthralgias, back pain, gait problem, joint swelling and myalgias  Skin: Negative for color change, rash and wound  Allergic/Immunologic: Negative for environmental allergies and food allergies  Neurological: Negative for dizziness, syncope, weakness, light-headedness, numbness and headaches  Hematological: Negative for adenopathy  Does not bruise/bleed easily  Psychiatric/Behavioral: Negative for sleep disturbance and suicidal ideas  The patient is not nervous/anxious            /80   Pulse 98   Ht 6' 2" (1 88 m)   Wt 108 kg (238 lb)   SpO2 98%   BMI 30 56 kg/m²   Social History     Socioeconomic History    Marital status: /Civil Union     Spouse name: Not on file    Number of children: Not on file    Years of education: Not on file    Highest education level: Not on file   Occupational History    Not on file   Tobacco Use    Smoking status: Never Smoker    Smokeless tobacco: Never Used   Vaping Use    Vaping Use: Never used   Substance and Sexual Activity    Alcohol use: Yes     Comment: social-holidyas    Drug use: Never    Sexual activity: Not on file   Other Topics Concern    Not on file   Social History Narrative    Not on file     Social Determinants of Health     Financial Resource Strain: Not on file   Food Insecurity: Not on file   Transportation Needs: Not on file   Physical Activity: Not on file   Stress: Not on file   Social Connections: Not on file   Intimate Partner Violence: Not on file   Housing Stability: Not on file     Past Medical History:   Diagnosis Date    GERD (gastroesophageal reflux disease)     History of stomach ulcers     Male infertility, unspecified     Varicocele     Family History   Problem Relation Age of Onset    Hypothyroidism Mother     Heart disease Father      Past Surgical History:   Procedure Laterality Date    EYE SURGERY Left     NASAL SEPTUM SURGERY N/A     NASAL SEPTUM SURGERY      CO ESOPHAGOGASTRODUODENOSCOPY TRANSORAL DIAGNOSTIC N/A 3/25/2019    Procedure: ESOPHAGOGASTRODUODENOSCOPY (EGD); Surgeon: Nelda Baptiste DO;  Location: [facility]; Service: Gastroenterology       Current Outpatient Medications:     esomeprazole (NexIUM) 40 MG capsule, TAKE ONE CAPSULE BY MOUTH EVERY DAY, Disp: 90 capsule, Rfl: 3    fluticasone (FLONASE) 50 mcg/act nasal spray, USE 1 SPRAY IN EACH NOSTRIL ONCE A DAY, Disp: 16 g, Rfl: 1    fluvoxaMINE (LUVOX) 100 mg tablet, Take 1 tablet (100 mg total) by mouth daily at bedtime, Disp: 30 tablet, Rfl: 3    meclizine (ANTIVERT) 25 mg tablet, TAKE ONE TABLET BY MOUTH THREE TIMES A DAY AS NEEDED FOR DIZINESS, Disp: 30 tablet, Rfl: 0    naproxen sodium (ANAPROX) 550 mg tablet, Take 1 tablet (550 mg total) by mouth 2 (two) times a day with meals, Disp: 30 tablet, Rfl: 0    amoxicillin (AMOXIL) 500 mg capsule, Take 1 capsule (500 mg total) by mouth every 8 (eight) hours for 10 days, Disp: 30 capsule, Rfl: 0    No Known Allergies       Lab Review   not applicable     Imaging: No results found  Objective:     Physical Exam  Constitutional:       General: He is not in acute distress  Appearance: He is well-developed  HENT:      Head: Normocephalic and atraumatic  Right Ear: Tympanic membrane and external ear normal       Left Ear: Tympanic membrane and external ear normal       Nose: Mucosal edema and rhinorrhea present  Right Sinus: Maxillary sinus tenderness present  Left Sinus: Maxillary sinus tenderness present  Mouth/Throat:      Pharynx: Uvula midline  Oropharyngeal exudate: Slightly erythematous with heavy postnasal drip  Eyes:      General: No scleral icterus  Conjunctiva/sclera: Conjunctivae normal    Cardiovascular:      Rate and Rhythm: Normal rate and regular rhythm  Heart sounds: Normal heart sounds  Pulmonary:      Effort: Pulmonary effort is normal       Breath sounds: Normal breath sounds  No wheezing ( negative forced expiratory wheeze)  Musculoskeletal:         General: Normal range of motion  Cervical back: Normal range of motion and neck supple  Lymphadenopathy:      Cervical: No cervical adenopathy  Skin:     General: Skin is warm and dry  Findings: No rash  Neurological:      Mental Status: He is oriented to person, place, and time  There are no Patient Instructions on file for this visit  DANNI Mello    Portions of the record may have been created with voice recognition software  Occasional wrong word or "sound a like" substitutions may have occurred due to the inherent limitations of voice recognition software  Read the chart carefully and recognize, using context, where substitutions have occurred

## 2022-06-03 ENCOUNTER — SOCIAL WORK (OUTPATIENT)
Dept: BEHAVIORAL/MENTAL HEALTH CLINIC | Facility: CLINIC | Age: 37
End: 2022-06-03
Payer: COMMERCIAL

## 2022-06-03 DIAGNOSIS — F41.9 ANXIETY: ICD-10-CM

## 2022-06-03 DIAGNOSIS — F33.1 MODERATE EPISODE OF RECURRENT MAJOR DEPRESSIVE DISORDER (HCC): Primary | ICD-10-CM

## 2022-06-03 PROCEDURE — 90834 PSYTX W PT 45 MINUTES: CPT | Performed by: SOCIAL WORKER
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
## 2022-06-05 DIAGNOSIS — F41.9 ANXIETY: ICD-10-CM

## 2022-06-05 RX ORDER — FLUVOXAMINE MALEATE 100 MG
TABLET ORAL
Qty: 30 TABLET | Refills: 3 | Status: SHIPPED | OUTPATIENT
Start: 2022-06-05 | End: 2022-06-06

## 2022-06-06 DIAGNOSIS — F41.9 ANXIETY: ICD-10-CM

## 2022-06-06 RX ORDER — FLUVOXAMINE MALEATE 100 MG
TABLET ORAL
Qty: 30 TABLET | Refills: 3 | Status: SHIPPED | OUTPATIENT
Start: 2022-06-06

## 2022-06-07 DIAGNOSIS — K29.00 OTHER ACUTE GASTRITIS WITHOUT HEMORRHAGE: ICD-10-CM

## 2022-06-07 RX ORDER — ESOMEPRAZOLE MAGNESIUM 40 MG/1
CAPSULE, DELAYED RELEASE ORAL
Qty: 90 CAPSULE | Refills: 3 | Status: SHIPPED | OUTPATIENT
Start: 2022-06-07 | End: 2022-06-12

## 2022-06-12 DIAGNOSIS — K29.00 OTHER ACUTE GASTRITIS WITHOUT HEMORRHAGE: ICD-10-CM

## 2022-06-12 RX ORDER — ESOMEPRAZOLE MAGNESIUM 40 MG/1
CAPSULE, DELAYED RELEASE ORAL
Qty: 90 CAPSULE | Refills: 3 | Status: SHIPPED | OUTPATIENT
Start: 2022-06-12

## 2022-07-15 ENCOUNTER — SOCIAL WORK (OUTPATIENT)
Dept: BEHAVIORAL/MENTAL HEALTH CLINIC | Facility: CLINIC | Age: 37
End: 2022-07-15
Payer: COMMERCIAL

## 2022-07-15 DIAGNOSIS — F41.9 ANXIETY: ICD-10-CM

## 2022-07-15 DIAGNOSIS — F33.1 MODERATE EPISODE OF RECURRENT MAJOR DEPRESSIVE DISORDER (HCC): Primary | ICD-10-CM

## 2022-07-15 PROCEDURE — 90834 PSYTX W PT 45 MINUTES: CPT | Performed by: SOCIAL WORKER

## 2022-07-18 NOTE — PSYCH
7: 00am-7:45am    Assessment/Plan: f/u in a couple months     There are no diagnoses linked to this encounter  Subjective: Therapist met w/pt for individual session  Pt stated that he has felt more irritable, on edge, lethargic, fatigue  He stated that he is recovering from [place] but that this week has been very busy at work which has increased his symptoms  Pt stated that he has no energy to exercise which is not helping his overall view of self  He stated that he has a sleep consultation next month and is hoping to figure out if he has sleep apnea which may also be contributing to his fatigue  Therapist and pt discussed healthy ways for pt to continue to manage his symptoms  Patient ID: Aida Schroeder is a 40 y o  male  HPI 45 minutes    Review of Systems      Objective: Pt appeared to be calm and easily engaged        Physical Exam  Pt denied any SI/HI/AH/Vh

## 2022-08-12 ENCOUNTER — CONSULT (OUTPATIENT)
Dept: PULMONOLOGY | Facility: CLINIC | Age: 37
End: 2022-08-12
Payer: COMMERCIAL

## 2022-08-12 VITALS
HEIGHT: 74 IN | HEART RATE: 88 BPM | WEIGHT: 238 LBS | SYSTOLIC BLOOD PRESSURE: 120 MMHG | TEMPERATURE: 97.8 F | OXYGEN SATURATION: 96 % | BODY MASS INDEX: 30.54 KG/M2 | DIASTOLIC BLOOD PRESSURE: 84 MMHG

## 2022-08-12 DIAGNOSIS — G47.30 SLEEP DISORDER BREATHING: ICD-10-CM

## 2022-08-12 DIAGNOSIS — E66.9 OBESITY (BMI 30-39.9): ICD-10-CM

## 2022-08-12 DIAGNOSIS — G47.33 OSA (OBSTRUCTIVE SLEEP APNEA): Primary | ICD-10-CM

## 2022-08-12 PROCEDURE — 99204 OFFICE O/P NEW MOD 45 MIN: CPT | Performed by: INTERNAL MEDICINE

## 2022-10-03 ENCOUNTER — TELEPHONE (OUTPATIENT)
Dept: GASTROENTEROLOGY | Facility: CLINIC | Age: 37
End: 2022-10-03

## 2022-10-03 NOTE — TELEPHONE ENCOUNTER
Faxed completed form to [employer] for prior auth of nexium to [id number]  -continuation of therapy

## 2022-10-14 NOTE — TELEPHONE ENCOUNTER
Patient called and stated insurance is asking for failure prove of Protonix and Prilosec   Please review and reach out to patient thank you

## 2022-10-14 NOTE — TELEPHONE ENCOUNTER
Resent auth to Bertis Cowden through covermymeds with failed as per below and a continuation of therapy with a  Positive result  Key: [id number]

## 2022-10-14 NOTE — TELEPHONE ENCOUNTER
Patients GI provider:  Dr Brianna Galloway    Number to return call: [phone]    Reason for call: Michelle Mcnair from [address] [place] [address] called stated esomeprazole needs authorization from pharmacy affairs       Pharmacy affairs phone: [phone]     Scheduled procedure/appointment date if applicable: Apt/procedure n/a

## 2022-11-01 DIAGNOSIS — F41.9 ANXIETY: ICD-10-CM

## 2022-11-01 RX ORDER — FLUVOXAMINE MALEATE 100 MG
TABLET ORAL
Qty: 30 TABLET | Refills: 3 | Status: SHIPPED | OUTPATIENT
Start: 2022-11-01

## 2022-11-02 ENCOUNTER — HOSPITAL ENCOUNTER (OUTPATIENT)
Dept: SLEEP CENTER | Facility: CLINIC | Age: 37
Discharge: HOME | End: 2022-11-02

## 2022-11-02 DIAGNOSIS — G47.33 OSA (OBSTRUCTIVE SLEEP APNEA): ICD-10-CM

## 2022-11-05 NOTE — PROGRESS NOTES
Home Sleep Study Documentation    HOME STUDY DEVICE: Noxturnal no                                           Kirsten G3 yes      Pre-Sleep Home Study:    Set-up and instructions performed by: HANK Tellez     Technician performed demonstration for Patient: yes    Return demonstration performed by Patient: yes    Written instructions provided to Patient: yes    Patient signed consent form: yes        Post-Sleep Home Study:    Additional comments by Patient:           Home Sleep Study Failed:no:    Failure reason: N/A    Reported or Detected: N/A    Scored by: MANOJ Howard

## 2022-11-10 ENCOUNTER — TELEPHONE (OUTPATIENT)
Dept: SLEEP CENTER | Facility: CLINIC | Age: 37
End: 2022-11-10

## 2022-11-10 NOTE — TELEPHONE ENCOUNTER
Kenyatta Krause  Sleep Medicine [facility] 2 hours ago (8:37 AM)     [place]    Patient called and left a voicemail  Wednesday 11/09 at 4:42pm  He was calling to check on results of Home Sleep Study  Patient stated that he was told that after a few days results would show in mychart  He wanted to make sure everything was okay since nothing was shown yet  Patient would like to receive a call back   Best number to reach patient is [phone]

## 2022-11-16 NOTE — TELEPHONE ENCOUNTER
Patient of Dr Misha Sanabria at [address] [place] [address] patient Sleep study shows mild JODY and he will follow up with Dr Misha Sanabria

## 2022-11-22 ENCOUNTER — OFFICE VISIT (OUTPATIENT)
Dept: PULMONOLOGY | Facility: CLINIC | Age: 37
End: 2022-11-22

## 2022-11-22 VITALS
OXYGEN SATURATION: 97 % | DIASTOLIC BLOOD PRESSURE: 84 MMHG | TEMPERATURE: 97.2 F | HEIGHT: 74 IN | SYSTOLIC BLOOD PRESSURE: 120 MMHG | WEIGHT: 227 LBS | BODY MASS INDEX: 29.13 KG/M2 | HEART RATE: 87 BPM

## 2022-11-22 DIAGNOSIS — F33.1 MODERATE EPISODE OF RECURRENT MAJOR DEPRESSIVE DISORDER (HCC): ICD-10-CM

## 2022-11-22 DIAGNOSIS — E66.3 OVERWEIGHT (BMI 25.0-29.9): ICD-10-CM

## 2022-11-22 DIAGNOSIS — G47.33 OSA (OBSTRUCTIVE SLEEP APNEA): Primary | ICD-10-CM

## 2022-11-22 LAB
DME PARACHUTE DELIVERY DATE REQUESTED: NORMAL
DME PARACHUTE ITEM DESCRIPTION: NORMAL
DME PARACHUTE ORDER STATUS: NORMAL
DME PARACHUTE SUPPLIER NAME: NORMAL
DME PARACHUTE SUPPLIER PHONE: NORMAL

## 2022-11-22 NOTE — PROGRESS NOTES
Assessment/Plan:   Diagnoses and all orders for this visit:    JODY (obstructive sleep apnea)  -     CPAP Auto New DME    Moderate episode of recurrent major depressive disorder (HCC)    Overweight (BMI 25 0-29 9)      his recent home sleep study results were reviewed with very mild obstructive sleep apnea with an AHI of 5 0, etiology pathogenesis of obstructive sleep apnea discussed in detail   Various treatment options discussed with mandible advancing appliance Pap titration as well as inspire were discussed   He would like to have the PAP machine, given his history of underlying recurrent major depressive disorder, with the mild obstructive sleep apnea he would benefit from the PAP machine again discussed the need for compliance with the machine will have it set up and he will start using it any concerns he will give the office a call  Cleaning of the supplies and change of CPAP supplies discussed   Will follow-up in 3 months with the CPAP download compliance   Recommend weight loss    No follow-ups on file  All questions are answered to the patient's satisfaction and understanding  He verbalizes understanding  He is encouraged to call with any further questions or concerns  Portions of the record may have been created with voice recognition software  Occasional wrong word or "sound a like" substitutions may have occurred due to the inherent limitations of voice recognition software  Read the chart carefully and recognize, using context, where substitutions have occurred      Electronically Signed by Harper Mendiola MD    ______________________________________________________________________    Chief Complaint:   Chief Complaint   Patient presents with   • Sleep Apnea       Patient ID: Zandra Mccoy is a 40 y o  y o  male has a past medical history of GERD (gastroesophageal reflux disease), History of stomach ulcers, Male infertility, unspecified, Moderate episode of recurrent major depressive disorder ([facility] [address] ) (5/5/2021), and Sleep apnea, obstructive  11/22/2022  Patient presents today for follow-up visit  Patient is here for evaluation for obstructive sleep apnea 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  his daily sleep schedule has not changed since last visit  Review of Systems   Constitutional: Positive for fatigue  HENT: Negative  Eyes: Negative  Respiratory: Negative  History of snoring witnessed apneic spells choking and gasping for air at night  Cardiovascular: Negative  Gastrointestinal: Negative  Endocrine: Negative  Genitourinary: Negative  Musculoskeletal: Negative  Allergic/Immunologic: Negative  Neurological: Negative  Hematological: Negative  Psychiatric/Behavioral: Negative  Smoking history: He reports that he has never smoked  He has never used smokeless tobacco     The following portions of the patient's history were reviewed and updated as appropriate: allergies, current medications, past family history, past medical history, past social history, past surgical history and problem list     Immunization History   Administered Date(s) Administered   • COVID-19 PFIZER VACCINE 0 3 ML IM 03/06/2021, 03/27/2021, 11/30/2021   • Influenza, injectable, quadrivalent, preservative free 0 5 mL 10/10/2020   • Influenza, recombinant, quadrivalent,injectable, preservative free 09/27/2021   • Tdap 07/07/2014     Current Outpatient Medications   Medication Sig Dispense Refill   • esomeprazole (NexIUM) 40 MG capsule TAKE ONE CAPSULE BY MOUTH EVERY DAY 90 capsule 3   • fluticasone (FLONASE) 50 mcg/act nasal spray USE 1 SPRAY IN EACH NOSTRIL ONCE A DAY 16 g 1   • meclizine (ANTIVERT) 25 mg tablet TAKE ONE TABLET BY MOUTH THREE TIMES A DAY AS NEEDED FOR DIZINESS 30 tablet 0   • meloxicam (Mobic) 15 mg tablet Take 1 tablet (15 mg total) by mouth daily 30 tablet 1     No current facility-administered medications for this visit       Allergies: Patient has no known allergies  Objective:  Vitals:    11/22/22 1102   BP: 120/84   Pulse: 87   Temp: (!) 97 2 °F (36 2 °C)   SpO2: 97%   Weight: 103 kg (227 lb)   Height: 6' 2" (1 88 m)   Oxygen Therapy  SpO2: 97 %    Wt Readings from Last 3 Encounters:   11/28/22 103 kg (228 lb)   11/22/22 103 kg (227 lb)   08/12/22 108 kg (238 lb)     Body mass index is 29 15 kg/m²  Physical Exam  Constitutional:       Appearance: He is well-developed  HENT:      Head: Normocephalic and atraumatic  Eyes:      Pupils: Pupils are equal, round, and reactive to light  Neck:      Comments: Short and wide neck  Cardiovascular:      Rate and Rhythm: Normal rate and regular rhythm  Heart sounds: Normal heart sounds  Pulmonary:      Effort: Pulmonary effort is normal       Breath sounds: Normal breath sounds  Musculoskeletal:         General: Normal range of motion  Cervical back: Normal range of motion and neck supple  Skin:     General: Skin is warm and dry  Neurological:      Mental Status: He is alert and oriented to person, place, and time  Psychiatric:         Behavior: Behavior normal            Diagnostics:  I have personally reviewed pertinent reports  ESS: Total score: 8  Home Study    Result Date: 11/16/2022  Narrative: Table formatting from the original result was not included  Images from the original result were not included  Home Study Report Patient Name: Izaiah Judge Patient Number: [id number] Date of Home Study: 11/2/2022 Referring Physician: Meri Moore Interpreting Physician: Meri ANGELA B : 1985 STUDY FORMAT: The patient was admitted to the sleep laboratory at the [address] [place] [address] and oriented to the home sleep study testing procedure and equipment  The home sleep testing study was performed using the [employer] device  A return demonstration by the patient helped to assure correct usage    The following parameters were monitored: p-flow via nasal cannula, body position, oximetry, pulse rate and respiratory effort via thoracic belt  The sleep study was scored following the rules established by the American Academy of Sleep Medicine (AASM)  Hypopneas are defined as a ?30% drop in flow for ? 10 seconds that are associated with ?4% desaturations  KP is the Respiratory Event Index (number of respiratory events per hour) and is a surrogate for the apnea/hypopnea index (AHI)  PATIENT HISTORY: Patient had a home sleep study done with complaints of snoring witnessed apneic spells to rule out obstructive sleep apnea TESTING RESULTS: The test results are from [facility]  The total time in bed (analysis time) was 566 1 minutes  The patient had a total of 47 respiratory events made up of 21 obstructive apneas, 1 central apneas, 0 mixed apneas and 25 hypopneas resulting in a respiratory event index (KP) of 5 0  The lowest SpO2 recorded is 90%  Impression: This home sleep study demonstrates very mild obstructive sleep apnea with an AHI of 5 0   Recommend CPAP titration study  Also to note the severity of the AHI can be underestimated in the home sleep study  Patient will follow up for review of the sleep study results  Board Certified Sleep Physician  Study Date: 11/2/2022 Patient Name: Omar Huerta Recording Device: Tyra Mercado Sex: M Height: 74 0 in  YOB: 1985 Weight: 238 0 lbs  Age: 40 years B  M I: 30 6 lb/in2 Times and Durations Lights off clock time:  9:13:32 PM Total Recording Time (TRT): 566 1 minutes Lights on clock time: 6:39:38 AM Time In Bed (TIB): 566 1 minutes   Monitoring Time (MT): 560 1 minutes Device and Sensor Details The study was recorded on a [employer] device using 1 RIP effort belt and a pressure based flow sensor  The heart rate is derived from the oximeter sensor and the snore signal is derived from the pressure sensor   The device also records body position and uses it to determine the monitoring time (sleep/wake periods)  Summary KP 5 0 OAI 2 2 MARK 0 1 Lowest Desat 90 KP is the number of respiratory events per hour  OAI is the number of obstructive apneas per hour  MARK is the number of central apneas per hour  Lowest Desat is the lowest blood oxygen level that lasted at least 2 seconds  RESPIRATORY EVENTS  Index (#/hour) Total # of Events Mean duration  (sec) Max duration  (sec) # of Events by Position      Supine Prone Left Right Up Central Apneas 0 1 1 17 5 17 5 0    0 1 0 Obstructive Apneas 2 2 21 16 5 25 0 10    2 9 0 Mixed Apneas 0 0 0 0 0 0 0 0    0 0 0 Hypopneas 2 7 25 35 7 53 0 17    1 7 0 Apneas + Hypopneas 5 0 47 26 7 53 0 27    3 17 0 RERAs 0 0 0 0 0 0 0 0    0 0 0 Total 5 0 47 26 7 53 0 27    3 17 0 Time in Position 293 5    53 8 212 7 3 1 KP in Position 5 5    3 3 4 8 0 0 Positional Summary  Supine Non-Supine Total # of Events 27 20 00 Total Duration (minutes) 293 5 269 60 Sleep Duration (minutes) 293 4 266 70 KP in Position 5 5 4 50 REISupine / REINon-Supine Ratio 1 22  Positional Sleep Apnea is indicated if KP (supine) >= 2 x KP (non-supine) per Alicia Lackey, Sleep  1984;7(2)    Oximetry Summary  Dur  (min) % TIB <90 % 0 0 0 0 <85 % 0 0 0 0 <80 % 0 0 0 0 <70 % 0 0 0 0 Total Dur (min) < 89 0 0 min Average (%) 95 Total # of Desats 30 Desat Index (#/hour) 3 2 Desat Max (%) 6 Desat Max dur (sec) 99 5 Lowest SpO2 % during sleep 90 Duration of Min SpO2 (sec) 8 Highest SpO2 % during sleep 98 Duration of Max SpO2(sec) 118  Heart Rate Stats Mean HR during sleep 58 3 (BPM) Highest HR during sleep 95  (BPM) Highest HR during TIB  95 (BPM) Lowest HR during sleep 49  (BPM) Lowest HR during TIB 49 (BPM) Snoring Summary Total Snoring Episodes 303 Total Duration with Snoring 70 7 minutes Mean Duration of Snoring 14 0 seconds Percentage of Snoring 12 6 %

## 2022-11-28 ENCOUNTER — OFFICE VISIT (OUTPATIENT)
Dept: FAMILY MEDICINE CLINIC | Facility: CLINIC | Age: 37
End: 2022-11-28

## 2022-11-28 VITALS
HEART RATE: 77 BPM | TEMPERATURE: 97.2 F | HEIGHT: 74 IN | DIASTOLIC BLOOD PRESSURE: 82 MMHG | OXYGEN SATURATION: 98 % | WEIGHT: 228 LBS | SYSTOLIC BLOOD PRESSURE: 124 MMHG | BODY MASS INDEX: 29.26 KG/M2

## 2022-11-28 DIAGNOSIS — S86.111A STRAIN OF RIGHT GASTROCNEMIUS MUSCLE, INITIAL ENCOUNTER: Primary | ICD-10-CM

## 2022-11-28 PROBLEM — F33.1 MODERATE EPISODE OF RECURRENT MAJOR DEPRESSIVE DISORDER (HCC): Status: RESOLVED | Noted: 2021-05-05 | Resolved: 2022-11-28

## 2022-11-28 RX ORDER — MELOXICAM 15 MG/1
15 TABLET ORAL DAILY
Qty: 30 TABLET | Refills: 1 | Status: SHIPPED | OUTPATIENT
Start: 2022-11-28

## 2022-11-28 NOTE — PROGRESS NOTES
Name: Yecenia Castro      : 1985      MRN: [id number]  Encounter Provider: Aicha Harry DO  Encounter Date: 2022   Encounter department: [facility] [address] [place] [address]     1  Strain of right gastrocnemius muscle, initial encounter  -     meloxicam (Mobic) 15 mg tablet; Take 1 tablet (15 mg total) by mouth daily         Subjective      Patient comes in today complaining of pain in the back of his right lower leg for approximately 6 weeks  He states that he has started an aggressive exercise program to help lose weight  He states the pain is worse when he 1st steps with the right leg he gets better as he stretches the muscles of the leg out  He states that he has been taking ibuprofen recently and it has helped  He states stopped taking his Luvox as he feels he does not needed  Review of Systems   Constitutional: Negative for chills, fatigue and fever  HENT: Negative for congestion, ear pain, hearing loss, postnasal drip, rhinorrhea and sore throat  Eyes: Negative for pain and visual disturbance  Respiratory: Negative for chest tightness, shortness of breath and wheezing  Cardiovascular: Negative for chest pain and leg swelling  Gastrointestinal: Negative for abdominal distention, abdominal pain, constipation, diarrhea and vomiting  Endocrine: Negative for cold intolerance and heat intolerance  Genitourinary: Negative for difficulty urinating, frequency and urgency  Musculoskeletal: Positive for myalgias (  Right posterior lower leg)  Negative for arthralgias and gait problem  Skin: Negative for color change  Neurological: Negative for dizziness, tremors, syncope, numbness and headaches  Hematological: Negative for adenopathy  Psychiatric/Behavioral: Negative for agitation, confusion and sleep disturbance  The patient is not nervous/anxious          Current Outpatient Medications on File Prior to Visit   Medication Sig   • esomeprazole (NexIUM) 40 MG capsule TAKE ONE CAPSULE BY MOUTH EVERY DAY   • fluticasone (FLONASE) 50 mcg/act nasal spray USE 1 SPRAY IN EACH NOSTRIL ONCE A DAY   • meclizine (ANTIVERT) 25 mg tablet TAKE ONE TABLET BY MOUTH THREE TIMES A DAY AS NEEDED FOR DIZINESS   • [DISCONTINUED] fluvoxaMINE (LUVOX) 100 mg tablet TAKE ONE TABLET BY MOUTH AT BEDTIME   • [DISCONTINUED] naproxen sodium (ANAPROX) 550 mg tablet Take 1 tablet (550 mg total) by mouth 2 (two) times a day with meals       Objective     /82 (BP Location: Left arm, Patient Position: Sitting, Cuff Size: Standard)   Pulse 77   Temp (!) 97 2 °F (36 2 °C) (Tympanic)   Ht 6' 2" (1 88 m)   Wt 103 kg (228 lb)   SpO2 98%   BMI 29 27 kg/m²     Physical Exam  Vitals and nursing note reviewed  Constitutional:       Appearance: He is well-developed and well-nourished  HENT:      Head: Normocephalic  Mouth/Throat:      Mouth: Oropharynx is clear and moist    Eyes:      General: No scleral icterus  Conjunctiva/sclera: Conjunctivae normal    Neck:      Thyroid: No thyromegaly  Cardiovascular:      Rate and Rhythm: Normal rate and regular rhythm  Heart sounds: Normal heart sounds  No murmur heard  Pulmonary:      Effort: Pulmonary effort is normal  No respiratory distress  Breath sounds: Normal breath sounds  No wheezing  Abdominal:      General: Bowel sounds are normal  There is no distension  Palpations: Abdomen is soft  Tenderness: There is no abdominal tenderness  Musculoskeletal:         General: No swelling, tenderness or edema  Normal range of motion  Cervical back: Normal range of motion  Lymphadenopathy:      Cervical: No cervical adenopathy  Skin:     General: Skin is warm and dry  Coloration: Skin is not pale  Findings: No rash  Neurological:      Mental Status: He is alert and oriented to person, place, and time  Cranial Nerves: No cranial nerve deficit     Psychiatric:         Mood and Affect: Mood and affect normal          Behavior: Behavior normal        Jamie Macario, DO

## 2023-01-01 ENCOUNTER — OFFICE VISIT (OUTPATIENT)
Dept: URGENT CARE | Facility: CLINIC | Age: 38
End: 2023-01-01

## 2023-01-01 VITALS
BODY MASS INDEX: 29.27 KG/M2 | OXYGEN SATURATION: 98 % | HEART RATE: 81 BPM | SYSTOLIC BLOOD PRESSURE: 140 MMHG | DIASTOLIC BLOOD PRESSURE: 102 MMHG | WEIGHT: 228 LBS

## 2023-01-01 DIAGNOSIS — B34.9 VIRAL SYNDROME: Primary | ICD-10-CM

## 2023-01-01 RX ORDER — ALBUTEROL SULFATE 90 UG/1
2 AEROSOL, METERED RESPIRATORY (INHALATION) EVERY 6 HOURS PRN
Qty: 8 G | Refills: 0 | Status: SHIPPED | OUTPATIENT
Start: 2023-01-01

## 2023-01-01 RX ORDER — BENZONATATE 100 MG/1
100 CAPSULE ORAL 3 TIMES DAILY PRN
Qty: 30 CAPSULE | Refills: 0 | Status: SHIPPED | OUTPATIENT
Start: 2023-01-01

## 2023-01-01 NOTE — PROGRESS NOTES
[facility]        NAME: Andre Chauhan is a 40 y o  male  : 1985    MRN: [id number]  DATE: 2023  TIME: 10:33 AM    Assessment and Plan   Viral syndrome [B34 9]  1  Viral syndrome  Cov/Flu-Collected at [facility]    albuterol (PROVENTIL HFA,VENTOLIN HFA) 90 mcg/act inhaler    benzonatate (TESSALON PERLES) 100 mg capsule            Patient Instructions   Patient Instructions   Albuterol and Tessalon as needed         Follow up with PCP in 3-5 days  Proceed to  ER if symptoms worsen  Chief Complaint     Chief Complaint   Patient presents with   • Cough     Pt c/o dry scratchy throat, cough, chest congestion for the last couple of days  History of Present Illness       The patient is a 61-year-old male presenting today for a scratchy throat, cough and chest congestion times a few days  Review of Systems   Review of Systems   Constitutional: Negative for activity change, appetite change, chills, diaphoresis and fever  HENT: Positive for congestion and sore throat  Negative for ear pain and rhinorrhea  Eyes: Negative for pain and visual disturbance  Respiratory: Positive for cough  Negative for chest tightness and shortness of breath  Cardiovascular: Negative for chest pain and palpitations  Gastrointestinal: Negative for abdominal pain, diarrhea, nausea and vomiting  Genitourinary: Negative for dysuria and hematuria  Musculoskeletal: Negative for arthralgias, back pain and myalgias  Skin: Negative for color change, pallor and rash  Neurological: Negative for seizures, syncope and headaches  All other systems reviewed and are negative          Current Medications       Current Outpatient Medications:   •  albuterol (PROVENTIL HFA,VENTOLIN HFA) 90 mcg/act inhaler, Inhale 2 puffs every 6 (six) hours as needed for wheezing, Disp: 8 g, Rfl: 0  •  benzonatate (TESSALON PERLES) 100 mg capsule, Take 1 capsule (100 mg total) by mouth 3 (three) times a day as needed for cough, Disp: 30 capsule, Rfl: 0  •  esomeprazole (NexIUM) 40 MG capsule, TAKE ONE CAPSULE BY MOUTH EVERY DAY, Disp: 90 capsule, Rfl: 3  •  fluticasone (FLONASE) 50 mcg/act nasal spray, USE 1 SPRAY IN EACH NOSTRIL ONCE A DAY, Disp: 16 g, Rfl: 1  •  meclizine (ANTIVERT) 25 mg tablet, TAKE ONE TABLET BY MOUTH THREE TIMES A DAY AS NEEDED FOR DIZINESS, Disp: 30 tablet, Rfl: 0  •  meloxicam (Mobic) 15 mg tablet, Take 1 tablet (15 mg total) by mouth daily, Disp: 30 tablet, Rfl: 1    Current Allergies     Allergies as of 01/01/2023   • (No Known Allergies)            The following portions of the patient's history were reviewed and updated as appropriate: allergies, current medications, past family history, past medical history, past social history, past surgical history and problem list      Past Medical History:   Diagnosis Date   • GERD (gastroesophageal reflux disease)    • History of stomach ulcers    • Male infertility, unspecified     Varicocele   • Moderate episode of recurrent major depressive disorder ([facility] [address] ) 5/5/2021   • Sleep apnea, obstructive        Past Surgical History:   Procedure Laterality Date   • EYE SURGERY Left    • NASAL SEPTUM SURGERY N/A    • NASAL SEPTUM SURGERY     • RI ESOPHAGOGASTRODUODENOSCOPY TRANSORAL DIAGNOSTIC N/A 3/25/2019    Procedure: ESOPHAGOGASTRODUODENOSCOPY (EGD); Surgeon: Nura Cruz DO;  Location: [facility]; Service: Gastroenterology       Family History   Problem Relation Age of Onset   • Hypothyroidism Mother    • Heart disease Father          Medications have been verified  Objective   BP (!) 140/102   Pulse 81   Wt 103 kg (228 lb)   SpO2 98%   BMI 29 27 kg/m²        Physical Exam     Physical Exam  Vitals and nursing note reviewed  Constitutional:       General: He is not in acute distress  Appearance: Normal appearance  He is normal weight  He is not ill-appearing, toxic-appearing or diaphoretic     HENT:      Head: Normocephalic and atraumatic  Right Ear: Tympanic membrane, ear canal and external ear normal       Left Ear: Tympanic membrane, ear canal and external ear normal       Nose: Nose normal  No congestion or rhinorrhea  Mouth/Throat:      Mouth: Mucous membranes are moist       Pharynx: Oropharynx is clear  Posterior oropharyngeal erythema present  No oropharyngeal exudate  Eyes:      Extraocular Movements: Extraocular movements intact  Conjunctiva/sclera: Conjunctivae normal       Pupils: Pupils are equal, round, and reactive to light  Cardiovascular:      Rate and Rhythm: Normal rate and regular rhythm  Heart sounds: Normal heart sounds  No murmur heard  No friction rub  No gallop  Pulmonary:      Effort: Pulmonary effort is normal  No respiratory distress  Breath sounds: Normal breath sounds  No stridor  No wheezing, rhonchi or rales  Chest:      Chest wall: No tenderness  Abdominal:      General: Abdomen is flat  Bowel sounds are normal  There is no distension  Palpations: Abdomen is soft  There is no mass  Tenderness: There is no abdominal tenderness  There is no guarding or rebound  Hernia: No hernia is present  Musculoskeletal:         General: Normal range of motion  Cervical back: Normal range of motion  Lymphadenopathy:      Cervical: No cervical adenopathy  Skin:     General: Skin is warm and dry  Capillary Refill: Capillary refill takes less than 2 seconds  Neurological:      Mental Status: He is alert

## 2023-01-02 ENCOUNTER — TELEPHONE (OUTPATIENT)
Dept: URGENT CARE | Facility: CLINIC | Age: 38
End: 2023-01-02

## 2023-01-02 NOTE — TELEPHONE ENCOUNTER
Pt walked in asking for covid results to registration  Advised registration that results were still pending

## 2023-01-03 LAB
FLUAV RNA RESP QL NAA+PROBE: NEGATIVE
FLUBV RNA RESP QL NAA+PROBE: NEGATIVE
SARS-COV-2 RNA RESP QL NAA+PROBE: NEGATIVE

## 2023-01-05 DIAGNOSIS — S86.111A STRAIN OF RIGHT GASTROCNEMIUS MUSCLE, INITIAL ENCOUNTER: ICD-10-CM

## 2023-01-08 RX ORDER — MELOXICAM 15 MG/1
TABLET ORAL
Qty: 30 TABLET | Refills: 1 | Status: SHIPPED | OUTPATIENT
Start: 2023-01-08

## 2023-01-13 ENCOUNTER — TELEPHONE (OUTPATIENT)
Dept: PULMONOLOGY | Facility: CLINIC | Age: 38
End: 2023-01-13

## 2023-01-17 LAB

## 2023-01-31 LAB

## 2023-04-03 ENCOUNTER — OFFICE VISIT (OUTPATIENT)
Dept: FAMILY MEDICINE CLINIC | Facility: CLINIC | Age: 38
End: 2023-04-03

## 2023-04-03 VITALS
WEIGHT: 235.8 LBS | SYSTOLIC BLOOD PRESSURE: 122 MMHG | OXYGEN SATURATION: 98 % | TEMPERATURE: 97.3 F | HEART RATE: 97 BPM | HEIGHT: 74 IN | BODY MASS INDEX: 30.26 KG/M2 | DIASTOLIC BLOOD PRESSURE: 84 MMHG

## 2023-04-03 DIAGNOSIS — F41.9 ANXIETY: Primary | ICD-10-CM

## 2023-04-03 RX ORDER — FLUVOXAMINE MALEATE 100 MG
TABLET ORAL
COMMUNITY
Start: 2023-03-17 | End: 2023-04-03 | Stop reason: SDUPTHER

## 2023-04-03 RX ORDER — FLUVOXAMINE MALEATE 100 MG
100 TABLET ORAL
Qty: 90 TABLET | Refills: 1 | Status: SHIPPED | OUTPATIENT
Start: 2023-04-03

## 2023-04-03 NOTE — PROGRESS NOTES
Name: Gilbert Blair      : 1985      MRN: [id number]  Encounter Provider: Mena Waddell DO  Encounter Date: 4/3/2023   Enc[facility] department: [facility] [address] [place] [address]  Anxiety  Assessment & Plan:  Continue on the fluvoxamine 100 mg, he is to let us know in a month and a half how he is doing and we will consider staying at the same dose or decreasing to 50 mg    Orders:  -     fluvoxaMINE (LUVOX) 100 mg tablet; Take 1 tablet (100 mg total) by mouth daily at bedtime           Subjective      Patient comes in today for follow-up, states that he did restart the fluvoxamine at 100 mg daily the middle of March  He states that he was feeling more anxious, quick tempered  He feels better since he restarted it  He states he has a lot of stress at work  Review of Systems   Constitutional: Negative for chills, fatigue and fever  HENT: Negative for congestion, ear pain, hearing loss, postnasal drip, rhinorrhea and sore throat  Eyes: Negative for pain and visual disturbance  Respiratory: Negative for chest tightness, shortness of breath and wheezing  Cardiovascular: Negative for chest pain and leg swelling  Gastrointestinal: Negative for abdominal distention, abdominal pain, constipation, diarrhea and vomiting  Endocrine: Negative for cold intolerance and heat intolerance  Genitourinary: Negative for difficulty urinating, frequency and urgency  Musculoskeletal: Negative for arthralgias and gait problem  Skin: Negative for color change  Neurological: Negative for dizziness, tremors, syncope, numbness and headaches  Hematological: Negative for adenopathy  Psychiatric/Behavioral: Negative for agitation, confusion and sleep disturbance  The patient is nervous/anxious          Current Outpatient Medications on File Prior to Visit   Medication Sig   • albuterol (PROVENTIL HFA,VENTOLIN HFA) 90 mcg/act inhaler Inhale 2 puffs every 6 "(six) hours as needed for wheezing   • fluticasone (FLONASE) 50 mcg/act nasal spray USE 1 SPRAY IN EACH NOSTRIL ONCE A DAY   • meclizine (ANTIVERT) 25 mg tablet TAKE ONE TABLET BY MOUTH THREE TIMES A DAY AS NEEDED FOR DIZINESS   • [DISCONTINUED] fluvoxaMINE (LUVOX) 100 mg tablet    • esomeprazole (NexIUM) 40 MG capsule TAKE ONE CAPSULE BY MOUTH EVERY DAY   • [DISCONTINUED] benzonatate (TESSALON PERLES) 100 mg capsule Take 1 capsule (100 mg total) by mouth 3 (three) times a day as needed for cough (Patient not taking: Reported on 4/3/2023)   • [DISCONTINUED] meloxicam (MOBIC) 15 mg tablet TAKE ONE TABLET BY MOUTH EVERY DAY (Patient not taking: Reported on 4/3/2023)       Objective     /84 (BP Location: Left arm, Patient Position: Sitting, Cuff Size: Standard)   Pulse 97   Temp (!) 97 3 °F (36 3 °C) (Tympanic)   Ht 6' 2\" (1 88 m)   Wt 107 kg (235 lb 12 8 oz)   SpO2 98%   BMI 30 27 kg/m²     Physical Exam  Vitals and nursing note reviewed  Constitutional:       Appearance: He is well-developed  HENT:      Head: Normocephalic  Eyes:      General: No scleral icterus  Conjunctiva/sclera: Conjunctivae normal    Neck:      Thyroid: No thyromegaly  Cardiovascular:      Rate and Rhythm: Normal rate and regular rhythm  Heart sounds: Normal heart sounds  No murmur heard  Pulmonary:      Effort: Pulmonary effort is normal  No respiratory distress  Breath sounds: Normal breath sounds  No wheezing  Abdominal:      General: Bowel sounds are normal  There is no distension  Palpations: Abdomen is soft  Tenderness: There is no abdominal tenderness  Musculoskeletal:         General: No tenderness  Normal range of motion  Cervical back: Normal range of motion  Lymphadenopathy:      Cervical: No cervical adenopathy  Skin:     General: Skin is warm and dry  Coloration: Skin is not pale  Findings: No rash     Neurological:      Mental Status: He is alert and oriented to " person, place, and time  Cranial Nerves: No cranial nerve deficit     Psychiatric:         Behavior: Behavior normal        Kay Campbell DO

## 2023-04-03 NOTE — ASSESSMENT & PLAN NOTE
Continue on the fluvoxamine 100 mg, he is to let us know in a month and a half how he is doing and we will consider staying at the same dose or decreasing to 50 mg

## 2023-04-24 ENCOUNTER — TELEPHONE (OUTPATIENT)
Age: 38
End: 2023-04-24

## 2023-04-24 ENCOUNTER — OFFICE VISIT (OUTPATIENT)
Age: 38
End: 2023-04-24

## 2023-04-24 VITALS
WEIGHT: 236.6 LBS | BODY MASS INDEX: 30.36 KG/M2 | TEMPERATURE: 98.5 F | DIASTOLIC BLOOD PRESSURE: 88 MMHG | SYSTOLIC BLOOD PRESSURE: 126 MMHG | OXYGEN SATURATION: 97 % | HEIGHT: 74 IN | HEART RATE: 93 BPM

## 2023-04-24 DIAGNOSIS — E66.9 OBESITY (BMI 30-39.9): ICD-10-CM

## 2023-04-24 DIAGNOSIS — F33.1 MODERATE EPISODE OF RECURRENT MAJOR DEPRESSIVE DISORDER (HCC): ICD-10-CM

## 2023-04-24 DIAGNOSIS — G47.33 OSA (OBSTRUCTIVE SLEEP APNEA): Primary | ICD-10-CM

## 2023-04-24 NOTE — PROGRESS NOTES
"Assessment/Plan:   Diagnoses and all orders for this visit:    JODY (obstructive sleep apnea)  -     PAP DME Resupply/Reorder    Moderate episode of recurrent major depressive disorder (HCC)    Obesity (BMI 30-39  9)        Mild obstructive sleep apnea with an AHI of 5 0 on auto CPAP consistently using it has decreased nocturnal awakenings and feels well rested when he is up in the morning  Reviewed CPAP download compliance for the past 90 days with 90% compliance, acceptable residual AHI of 1 5  Cleaning of the supplies and change of CPAP supplies discussed  Recommend weight loss  Caution against driving when sleepy  Follow-up in 1 year or as needed earlier as needed  Return in about 1 year (around 4/24/2024)  All questions are answered to the patient's satisfaction and understanding  He verbalizes understanding  He is encouraged to call with any further questions or concerns  Portions of the record may have been created with voice recognition software  Occasional wrong word or \"sound a like\" substitutions may have occurred due to the inherent limitations of voice recognition software  Read the chart carefully and recognize, using context, where substitutions have occurred  Electronically Signed by Steffen Bernal MD    ______________________________________________________________________    Chief Complaint:   Chief Complaint   Patient presents with   • Follow-up   • Sleep Apnea       Patient ID: Helena Gilliland is a 40 y o  y o  male has a past medical history of GERD (gastroesophageal reflux disease), History of stomach ulcers, Male infertility, unspecified, Moderate episode of recurrent major depressive disorder ([facility] [address] ) (5/5/2021), and Sleep apnea, obstructive  4/24/2023  Patient presents today for follow-up visit  Patient with history of mild obstructive sleep apnea on CPAP but consistently using it he states he has decreased nocturnal awakenings and feels well rested when he is up in the morning      primary " symptoms  Pertinent negatives include no chest pain, fever, headaches, myalgias or sore throat  Review of Systems   Constitutional: Negative  Negative for appetite change and fever  HENT: Negative  Negative for ear pain, postnasal drip, rhinorrhea, sneezing, sore throat and trouble swallowing  Eyes: Negative  Respiratory: Negative  Cardiovascular: Negative  Negative for chest pain  Gastrointestinal: Negative  Endocrine: Negative  Genitourinary: Negative  Musculoskeletal: Negative  Negative for myalgias  Allergic/Immunologic: Negative  Neurological: Negative  Negative for headaches  Hematological: Negative  Psychiatric/Behavioral: Negative  Smoking history: He reports that he has never smoked   He has never used smokeless tobacco     The following portions of the patient's history were reviewed and updated as appropriate: allergies, current medications, past family history, past medical history, past social history, past surgical history and problem list     Immunization History   Administered Date(s) Administered   • COVID-19 PFIZER VACCINE 0 3 ML IM 03/06/2021, 03/27/2021, 11/30/2021   • Influenza, injectable, quadrivalent, preservative free 0 5 mL 10/10/2020   • Influenza, recombinant, quadrivalent,injectable, preservative free 09/27/2021   • Tdap 07/07/2014     Current Outpatient Medications   Medication Sig Dispense Refill   • albuterol (PROVENTIL HFA,VENTOLIN HFA) 90 mcg/act inhaler Inhale 2 puffs every 6 (six) hours as needed for wheezing 8 g 0   • esomeprazole (NexIUM) 40 MG capsule TAKE ONE CAPSULE BY MOUTH EVERY DAY 90 capsule 3   • fluticasone (FLONASE) 50 mcg/act nasal spray USE 1 SPRAY IN EACH NOSTRIL ONCE A DAY 16 g 1   • fluvoxaMINE (LUVOX) 100 mg tablet TAKE ONE TABLET BY MOUTH EVERY DAY AT BEDTIME 30 tablet 3   • meclizine (ANTIVERT) 25 mg tablet TAKE ONE TABLET BY MOUTH THREE TIMES A DAY AS NEEDED FOR DIZINESS 30 tablet 0     No current "facility-administered medications for this visit  Allergies: Patient has no known allergies  Objective:  Vitals:    04/24/23 1020   BP: 126/88   Pulse: 93   Temp: 98 5 °F (36 9 °C)   SpO2: 97%   Weight: 107 kg (236 lb 9 6 oz)   Height: 6' 2\" (1 88 m)   Oxygen Therapy  SpO2: 97 %    Wt Readings from Last 3 Encounters:   04/24/23 107 kg (236 lb 9 6 oz)   04/03/23 107 kg (235 lb 12 8 oz)   01/01/23 103 kg (228 lb)     Body mass index is 30 38 kg/m²  Physical Exam  Constitutional:       Appearance: He is well-developed  HENT:      Head: Normocephalic and atraumatic  Eyes:      Pupils: Pupils are equal, round, and reactive to light  Neck:      Comments: Short and wide neck  Cardiovascular:      Rate and Rhythm: Normal rate and regular rhythm  Heart sounds: Normal heart sounds  Pulmonary:      Effort: Pulmonary effort is normal       Breath sounds: Normal breath sounds  Musculoskeletal:         General: Normal range of motion  Cervical back: Normal range of motion and neck supple  Skin:     General: Skin is warm and dry  Neurological:      Mental Status: He is alert and oriented to person, place, and time  Psychiatric:         Behavior: Behavior normal            Diagnostics:  I have personally reviewed pertinent reports      ESS: Total score: 4    Answers for HPI/ROS submitted by the patient on 4/23/2023  Chronicity: recurrent  When did you first notice your symptoms?: more than 1 year ago  How often do your symptoms occur?: daily  Since you first noticed this problem, how has it changed?: gradually improving  Do you have shortness of breath that occurs with effort or exertion?: No  Do you have ear congestion?: No  Do you have heartburn?: No  Do you have fatigue?: No  Do you have nasal congestion?: No  Do you have shortness of breath when lying flat?: No  Do you have shortness of breath when you wake up?: No  Do you have sweats?: No  Have you experienced weight loss?: No  Which of the " following makes your symptoms worse?: nothing  Which of the following makes your symptoms better?: nothing

## 2023-04-26 LAB

## 2023-07-10 ENCOUNTER — OFFICE VISIT (OUTPATIENT)
Dept: FAMILY MEDICINE CLINIC | Facility: CLINIC | Age: 38
End: 2023-07-10
Payer: COMMERCIAL

## 2023-07-10 VITALS
HEART RATE: 92 BPM | OXYGEN SATURATION: 97 % | HEIGHT: 74 IN | BODY MASS INDEX: 31.06 KG/M2 | WEIGHT: 242 LBS | SYSTOLIC BLOOD PRESSURE: 138 MMHG | DIASTOLIC BLOOD PRESSURE: 90 MMHG

## 2023-07-10 DIAGNOSIS — M62.838 MUSCLE SPASM: Primary | ICD-10-CM

## 2023-07-10 PROCEDURE — 99214 OFFICE O/P EST MOD 30 MIN: CPT | Performed by: FAMILY MEDICINE

## 2023-07-10 RX ORDER — METHYLPREDNISOLONE 4 MG/1
TABLET ORAL
Qty: 21 EACH | Refills: 0 | Status: SHIPPED | OUTPATIENT
Start: 2023-07-10

## 2023-07-10 NOTE — PROGRESS NOTES
BMI Counseling: Body mass index is 31.07 kg/m². The BMI is above normal. Nutrition recommendations include decreasing portion sizes, decreasing fast food intake, limiting drinks that contain sugar, moderation in carbohydrate intake, increasing intake of lean protein, reducing intake of saturated and trans fat and reducing intake of cholesterol. Exercise recommendations include moderate physical activity 150 minutes/week and exercising 3-5 times per week. Rationale for BMI follow-up plan is due to patient being overweight or obese. Assessment/Plan:     Chronic Problems:  No problem-specific Assessment & Plan notes found for this encounter. Visit Diagnosis:  Diagnoses and all orders for this visit:    Muscle spasm  -     methylPREDNISolone 4 MG tablet therapy pack; Use as directed on package          Subjective:    Patient ID: Magi Potts is a 41 y.o. male. C/o numbness to left hand / arm   Tingling sensation  intermittent 1.5 wks   Began after exercise regimen ,   Neg weakness , noted after awakening in the morning , with certain movement of the arm / shoulder   Neg rash  Neg sob , hosea , palp , neg injury /fall   Self treat - with nothing   Heading to [place] , 9 aug      The following portions of the patient's history were reviewed and updated as appropriate: allergies, current medications, past family history, past medical history, past social history, past surgical history and problem list.    Review of Systems   Constitutional: Negative for appetite change, chills, fever and unexpected weight change. HENT: Negative for congestion, dental problem, ear pain, hearing loss, postnasal drip, rhinorrhea, sinus pressure, sinus pain, sneezing, sore throat, tinnitus and voice change. Eyes: Negative for visual disturbance. Respiratory: Negative for apnea, cough, chest tightness and shortness of breath. Cardiovascular: Negative for chest pain, palpitations and leg swelling.    Gastrointestinal: Negative for abdominal pain, blood in stool, constipation, diarrhea, nausea and vomiting. Endocrine: Negative for cold intolerance, heat intolerance, polydipsia, polyphagia and polyuria. Genitourinary: Negative for decreased urine volume, difficulty urinating, dysuria, frequency and hematuria. Musculoskeletal: Negative for arthralgias, back pain, gait problem, joint swelling and myalgias. Skin: Negative for color change, rash and wound. Allergic/Immunologic: Negative for environmental allergies and food allergies. Neurological: Positive for numbness. Negative for dizziness, syncope, weakness, light-headedness and headaches. Hematological: Negative for adenopathy. Does not bruise/bleed easily. Psychiatric/Behavioral: Negative for sleep disturbance and suicidal ideas. The patient is not nervous/anxious.           /90   Pulse 92   Ht 6' 2" (1.88 m)   Wt 110 kg (242 lb)   SpO2 97%   BMI 31.07 kg/m²   Social History     Socioeconomic History   • Marital status: /Civil Union     Spouse name: Not on file   • Number of children: Not on file   • Years of education: Not on file   • Highest education level: Not on file   Occupational History   • Not on file   Tobacco Use   • Smoking status: Never   • Smokeless tobacco: Never   Vaping Use   • Vaping Use: Never used   Substance and Sexual Activity   • Alcohol use: Yes     Comment: social-holidyas   • Drug use: Never   • Sexual activity: Not on file   Other Topics Concern   • Not on file   Social History Narrative   • Not on file     Social Determinants of Health     Financial Resource Strain: Not on file   Food Insecurity: Not on file   Transportation Needs: Not on file   Physical Activity: Not on file   Stress: Not on file   Social Connections: Not on file   Intimate Partner Violence: Not on file   Housing Stability: Not on file     Past Medical History:   Diagnosis Date   • GERD (gastroesophageal reflux disease)    • History of stomach ulcers • Male infertility, unspecified     Varicocele   • Moderate episode of recurrent major depressive disorder ([address]) 5/5/2021   • Sleep apnea, obstructive      Family History   Problem Relation Age of Onset   • Hypothyroidism Mother    • Heart disease Father      Past Surgical History:   Procedure Laterality Date   • EYE SURGERY Left    • NASAL SEPTUM SURGERY N/A    • NASAL SEPTUM SURGERY     • AL ESOPHAGOGASTRODUODENOSCOPY TRANSORAL DIAGNOSTIC N/A 3/25/2019    Procedure: ESOPHAGOGASTRODUODENOSCOPY (EGD); Surgeon: Alex Gray DO;  Location: [facility];   Service: Gastroenterology       Current Outpatient Medications:   •  esomeprazole (NexIUM) 40 MG capsule, TAKE ONE CAPSULE BY MOUTH EVERY DAY, Disp: 90 capsule, Rfl: 0  •  fluticasone (FLONASE) 50 mcg/act nasal spray, USE 1 SPRAY IN EACH NOSTRIL ONCE A DAY, Disp: 16 g, Rfl: 1  •  fluvoxaMINE (LUVOX) 100 mg tablet, TAKE ONE TABLET BY MOUTH EVERY DAY AT BEDTIME, Disp: 30 tablet, Rfl: 3  •  meclizine (ANTIVERT) 25 mg tablet, TAKE ONE TABLET BY MOUTH THREE TIMES A DAY AS NEEDED FOR DIZINESS, Disp: 30 tablet, Rfl: 0  •  methylPREDNISolone 4 MG tablet therapy pack, Use as directed on package, Disp: 21 each, Rfl: 0  •  albuterol (PROVENTIL HFA,VENTOLIN HFA) 90 mcg/act inhaler, Inhale 2 puffs every 6 (six) hours as needed for wheezing (Patient not taking: Reported on 7/10/2023), Disp: 8 g, Rfl: 0    No Known Allergies       Lab Review   Orders Only on 04/24/2023   Component Date Value   • Supplier Name 04/24/2023 [employer]    • Supplier Phone Number 04/24/2023 [phone]    • Order Status 04/24/2023 Delivery Successful    • Delivery Request Date 04/24/2023 04/24/2023    • Date Delivered  04/24/2023 04/24/2023    • Item Description 04/24/2023 PAP Accessory    • Item Description 04/24/2023 PAP Mask, Full Face, Fit Upon Setup, N/A, 1 per 3 months    • Item Description 04/24/2023 Humidifier Water Chamber, 1 per 6 months    • Item Description 04/24/2023 PAP Headgear, 1 per 6 months    • Item Description 04/24/2023 PAP Chinstrap, 1 per 6 months    • Item Description 04/24/2023 PAP Humidifier, Heated    • Item Description 04/24/2023 PAP Monitoring Modem    • Item Description 04/24/2023 Standard PAP Tubing, Non-Heated, 1 per 3 months    • Item Description 04/24/2023 Heated PAP Tubing, 1 per 3 months    • Item Description 04/24/2023 Disposable PAP Filter, 2 per 1 month    • Item Description 04/24/2023 Non-Disposable PAP Filter, 1 per 6 months    • Item Description 04/24/2023 PAP Mask Interface Cushion, Full Face, 1 per 1 month         Imaging: No results found. Objective:     Physical Exam  Constitutional:       General: He is not in acute distress. Appearance: He is not ill-appearing or toxic-appearing. HENT:      Head: Normocephalic and atraumatic. Eyes:      Conjunctiva/sclera: Conjunctivae normal.   Cardiovascular:      Rate and Rhythm: Normal rate. Pulmonary:      Effort: Pulmonary effort is normal.   Musculoskeletal:         General: Tenderness present. Right shoulder: No tenderness. Left shoulder: Tenderness present. Decreased range of motion. Cervical back: Tenderness present. Comments: Point tenderness acromial pro  Neg dropp , neg cross , neg impingment    Skin:     General: Skin is warm and dry. Neurological:      Mental Status: He is oriented to person, place, and time. Psychiatric:         Mood and Affect: Mood normal.         Behavior: Behavior normal.         Thought Content: Thought content normal.         Judgment: Judgment normal.           There are no Patient Instructions on file for this visit. DANNI Adhikari    Portions of the record may have been created with voice recognition software. Occasional wrong word or "sound a like" substitutions may have occurred due to the inherent limitations of voice recognition software.   Read the chart carefully and recognize, using context, where substitutions have occurred.

## 2023-08-07 ENCOUNTER — OFFICE VISIT (OUTPATIENT)
Age: 38
End: 2023-08-07
Payer: COMMERCIAL

## 2023-08-07 VITALS
WEIGHT: 242 LBS | SYSTOLIC BLOOD PRESSURE: 158 MMHG | DIASTOLIC BLOOD PRESSURE: 96 MMHG | HEIGHT: 74 IN | HEART RATE: 90 BPM | BODY MASS INDEX: 31.06 KG/M2

## 2023-08-07 DIAGNOSIS — M75.42 IMPINGEMENT SYNDROME OF LEFT SHOULDER: ICD-10-CM

## 2023-08-07 DIAGNOSIS — M62.838 MUSCLE SPASM: ICD-10-CM

## 2023-08-07 DIAGNOSIS — M99.01 SEGMENTAL DYSFUNCTION OF CERVICAL REGION: Primary | ICD-10-CM

## 2023-08-07 DIAGNOSIS — M99.02 SEGMENTAL DYSFUNCTION OF THORACIC REGION: ICD-10-CM

## 2023-08-07 PROCEDURE — 99203 OFFICE O/P NEW LOW 30 MIN: CPT | Performed by: CHIROPRACTOR

## 2023-08-07 PROCEDURE — 98940 CHIROPRACT MANJ 1-2 REGIONS: CPT | Performed by: CHIROPRACTOR

## 2023-08-07 PROCEDURE — 97110 THERAPEUTIC EXERCISES: CPT | Performed by: CHIROPRACTOR

## 2023-08-07 NOTE — PROGRESS NOTES
Initial date of service: 8/7/23    Diagnoses and all orders for this visit:    Segmental dysfunction of cervical region    Impingement syndrome of left shoulder    Segmental dysfunction of thoracic region    Muscle spasm      No red flags, radiculopathy or neurologic deficit appreciated clinically. Pt's symptoms and exam findings consistent with mechanical neck/ubp and LUE pain/paresthesia secondary to repetitive st/sp injury of L shoulder, possibly labrum, with resultant compensatory spasm, exacerbated by postural/ergonomic stressors. Pt responded well to traction, stretches and manual mobilization of the affected spinal and myofascial jt dysfunction, with increased ROM; trial of conservative tx recommended consisting of stretching, ther-ex, graded mobilization/manipulation of the affected spinal/myofascial tissues, postural/ergonomic education, and take home stretches/exercises. If symptoms fail to improve with short trial of conservative care, appropriate imaging and referral will be coordinated. Spent greater than 30 min c pt discussing hx, pe, ddx, tx options and reviewing notes/imaging    TREATMENT:  [id number]  Fear avoidance behavior discussion, encouraged and reassured pt that natural course of condition is to improve over time with adherence to tx plan and home care strategies. Home care recommendations: avoid bed rest, walk (but avoid trails and uneven surfaces), gradual return to activity to tolerance (avoid anything that peripheralizes symptoms), ice 20 min on/off, watch for ice burn, call if symptoms peripheralize, worsen, or neurologic deficit progresses.  Ther-ex: IASTM - discussed post procedure soreness and/or ecchymosis for up to 36 hrs, applied to affected mm hypertonicities; wall peyman, axial retraction, upper trap stretch, lev scap stretch, SCM stretch, pec minor stretch, lat rows with t-band, lat pull down with t-band, abdominal bracing; greater than 15 min spent performing above mentioned ther-ex to improve ROM/flexibility. Thoracic mobilization/manipulation: prone P-A mob, supine A-P manip; cervical mobilization/manipulation: diversified supine graded mobilization, cervical traction    HPI:  Michael Mccoy is a 41 y.o. male   Chief Complaint   Patient presents with   • Neck - Pain     Neck pain that radiates down left shoulder, left arm down to left hand and fingers. Pain is dull with mid arm down becomes numbness. Pain Score 5     Pt presents for eval and tx forneck/ubp into LUE 1st digits onset end of June 2023 after stretching to try to touch toes after a run; felt a pull/pop in R shoulder. Pt went to PCP who prescribed oral steroid and referred here. Pt works on computer    Neck Pain   This is a new problem. The current episode started more than 1 month ago. The pain is present in the midline and left side. The quality of the pain is described as aching (N/T). Exacerbated by: being on phone in bed, swinging a bat; alleviated by moving hand. The pain is worse during the day. He has tried ice and NSAIDs for the symptoms. Past Medical History:   Diagnosis Date   • GERD (gastroesophageal reflux disease)    • History of stomach ulcers    • Male infertility, unspecified     Varicocele   • Moderate episode of recurrent major depressive disorder ([address]) 5/5/2021   • Sleep apnea, obstructive       The following portions of the patient's history were reviewed and updated as appropriate: allergies, past family history, past medical history, past social history, past surgical history, and problem list.  Review of Systems   Musculoskeletal: Positive for neck pain. Physical Exam  Eyes:      Extraocular Movements: Extraocular movements intact. Neck:        Comments: C/S Pnful and limited in ext/Lrot  L sh pnful and limited  Cardiovascular:      Pulses: Normal pulses. Musculoskeletal:      Cervical back: Pain with movement and muscular tenderness present. Decreased range of motion.       Thoracic back: Spasms and tenderness present. Decreased range of motion. Back:    Lymphadenopathy:      Cervical: No cervical adenopathy. Neurological:      Mental Status: He is alert and oriented to person, place, and time. Cranial Nerves: Cranial nerves 2-12 are intact. Sensory: Sensation is intact. Motor: Motor function is intact. Coordination: Coordination is intact. Gait: Gait is intact. Gait and tandem walk normal.      Deep Tendon Reflexes: Reflexes normal. Babinski sign absent on the right side. Babinski sign absent on the left side. Reflex Scores:       Tricep reflexes are 2+ on the right side and 2+ on the left side. Bicep reflexes are 2+ on the right side and 2+ on the left side. Brachioradialis reflexes are 2+ on the right side and 2+ on the left side. Psychiatric:         Mood and Affect: Mood and affect normal.       SOFT TISSUE ASSESSMENT: Hypertonicity and tenderness palpated C5-T6 erector spinae, L upper trap, L lev scap, L scalene,L  rhomboid, L pec minor, L brachioradialis JOINT RECTRICTIONS: C5-T6 ORTHO: Saba unremarkable for centralization/peripheralization; max foraminal comp elicits local np L; shoulder depression elicits stiffness in L upper trap; brachial plexus tension test elicits no neural tension in R/L UE; cervical distraction palliative    Return in about 1 week (around 8/14/2023) for Next scheduled follow up.

## 2023-08-08 ENCOUNTER — OFFICE VISIT (OUTPATIENT)
Dept: URGENT CARE | Facility: CLINIC | Age: 38
End: 2023-08-08
Payer: COMMERCIAL

## 2023-08-08 VITALS
OXYGEN SATURATION: 98 % | SYSTOLIC BLOOD PRESSURE: 140 MMHG | HEART RATE: 80 BPM | TEMPERATURE: 98.5 F | DIASTOLIC BLOOD PRESSURE: 90 MMHG | RESPIRATION RATE: 18 BRPM

## 2023-08-08 DIAGNOSIS — J30.1 SEASONAL ALLERGIC RHINITIS DUE TO POLLEN: Primary | ICD-10-CM

## 2023-08-08 PROCEDURE — 99213 OFFICE O/P EST LOW 20 MIN: CPT

## 2023-08-08 RX ORDER — PREDNISONE 20 MG/1
40 TABLET ORAL DAILY
Qty: 10 TABLET | Refills: 0 | Status: SHIPPED | OUTPATIENT
Start: 2023-08-08 | End: 2023-08-13

## 2023-08-08 NOTE — PROGRESS NOTES
[facility]        NAME: Azalia Tolliver is a 41 y.o. male  : 1985    MRN: [id number]  DATE: 2023  TIME: 8:24 AM    Assessment and Plan   Seasonal allergic rhinitis due to pollen [J30.1]  1. Seasonal allergic rhinitis due to pollen  predniSONE 20 mg tablet        Patient declined COVID/flu and Strep testing, requesting an antibiotic as he is going out of town and his wife is currently on the "z-pack" for her symptoms. Advised patient that is not warranted at this time as he likely has a viral infection or exacerbation of seasonal allergies given his wife has similar symptoms and have been <1 week of symptoms. Informed patient I can offer prednisone for symptoms, but symptoms most likely will resolve with OTC products. Patient verbalizes understanding and agreeable to plan. Patient Instructions     Take medications as prescribed for next 5 days. Symptoms of a viral infection may linger for up to 10 days. Your contagious period is the first 5-7 days of symptom onset. Continue over-the-counter products for symptoms: tylenol for fevers, ibuprofen for body aches, flonase (fluticasone) with nasal saline and sudafed for nasal congestion, and airborne/emergen-c for vitamin supplementation. Follow-up with PCP in 3-5 days if no improvement of symptoms. Report to ER if symptoms worsen. Chief Complaint     Chief Complaint   Patient presents with   • Nasal Congestion     Started on sat. With nasal congestion and PND. Taking Mucinex D started to get dizzy so stopped it. No dizziness at present. Going away on vacation and would like something to resolve symptoms. History of Present Illness       41year old male presents for evaluation of nasal congestion that started 2-3 days ago. He has a history of seasonal allergies and relates his wife has similar symptoms. He denies associated cough, fever, body aches, chills, or shortness of breath.  He has been taking allegra, flonase, and mucinex-d for symptoms but relates the mucinex-d made him dizzy so he stopped taking it. He took meclizine as previously prescribed for dizziness with some improvement of symptoms. URI   This is a new problem. The current episode started in the past 7 days. The problem has been unchanged. There has been no fever. Associated symptoms include congestion, rhinorrhea and a sore throat. Pertinent negatives include no abdominal pain, chest pain, coughing, diarrhea, dysuria, ear pain, headaches, joint pain, joint swelling, nausea, neck pain, plugged ear sensation, rash, sinus pain, sneezing, swollen glands, vomiting or wheezing. He has tried antihistamine and decongestant for the symptoms. The treatment provided no relief. Review of Systems   Review of Systems   Constitutional: Negative for activity change, appetite change, chills, fatigue and fever. HENT: Positive for congestion, postnasal drip, rhinorrhea and sore throat. Negative for ear pain, sinus pressure, sinus pain, sneezing and trouble swallowing. Eyes: Negative for visual disturbance. Respiratory: Negative for cough, chest tightness, shortness of breath and wheezing. Cardiovascular: Negative for chest pain. Gastrointestinal: Negative for abdominal pain, constipation, diarrhea, nausea and vomiting. Genitourinary: Negative for dysuria. Musculoskeletal: Negative for arthralgias, back pain, joint pain, myalgias and neck pain. Skin: Negative for color change and rash. Allergic/Immunologic: Positive for environmental allergies. Negative for food allergies. Neurological: Positive for dizziness (resolved). Negative for light-headedness and headaches.          Current Medications       Current Outpatient Medications:   •  albuterol (PROVENTIL HFA,VENTOLIN HFA) 90 mcg/act inhaler, Inhale 2 puffs every 6 (six) hours as needed for wheezing, Disp: 8 g, Rfl: 0  •  esomeprazole (NexIUM) 40 MG capsule, TAKE ONE CAPSULE BY MOUTH EVERY DAY, Disp: 90 capsule, Rfl: 0  •  fluticasone (FLONASE) 50 mcg/act nasal spray, USE 1 SPRAY IN EACH NOSTRIL ONCE A DAY, Disp: 16 g, Rfl: 1  •  fluvoxaMINE (LUVOX) 100 mg tablet, TAKE ONE TABLET BY MOUTH EVERY DAY AT BEDTIME, Disp: 30 tablet, Rfl: 3  •  meclizine (ANTIVERT) 25 mg tablet, TAKE ONE TABLET BY MOUTH THREE TIMES A DAY AS NEEDED FOR DIZINESS, Disp: 30 tablet, Rfl: 0  •  predniSONE 20 mg tablet, Take 2 tablets (40 mg total) by mouth daily for 5 days, Disp: 10 tablet, Rfl: 0    Current Allergies     Allergies as of 08/08/2023   • (No Known Allergies)            The following portions of the patient's history were reviewed and updated as appropriate: allergies, current medications, past family history, past medical history, past social history, past surgical history and problem list.     Past Medical History:   Diagnosis Date   • GERD (gastroesophageal reflux disease)    • History of stomach ulcers    • Male infertility, unspecified     Varicocele   • Moderate episode of recurrent major depressive disorder ([facility]) 5/5/2021   • Sleep apnea, obstructive        Past Surgical History:   Procedure Laterality Date   • EYE SURGERY Left    • NASAL SEPTUM SURGERY N/A    • NASAL SEPTUM SURGERY     • CT ESOPHAGOGASTRODUODENOSCOPY TRANSORAL DIAGNOSTIC N/A 3/25/2019    Procedure: ESOPHAGOGASTRODUODENOSCOPY (EGD); Surgeon: Christ Hankins DO;  Location: [facility]; Service: Gastroenterology       Family History   Problem Relation Age of Onset   • Hypothyroidism Mother    • Heart disease Father          Medications have been verified. Objective   /90   Pulse 80   Temp 98.5 °F (36.9 °C)   Resp 18   SpO2 98%        Physical Exam     Physical Exam  Vitals and nursing note reviewed. Constitutional:       General: He is awake. Appearance: Normal appearance. He is well-developed and overweight. HENT:      Head: Normocephalic and atraumatic.       Right Ear: Hearing, tympanic membrane, ear canal and external ear normal. Left Ear: Hearing, tympanic membrane, ear canal and external ear normal.      Nose: Congestion and rhinorrhea present. Rhinorrhea is clear. Right Turbinates: Enlarged. Not swollen or pale. Left Turbinates: Enlarged. Not swollen or pale. Right Sinus: No maxillary sinus tenderness or frontal sinus tenderness. Left Sinus: No maxillary sinus tenderness or frontal sinus tenderness. Mouth/Throat:      Lips: Pink. Mouth: Mucous membranes are moist.      Pharynx: Oropharynx is clear. Uvula midline. Posterior oropharyngeal erythema present. No pharyngeal swelling, oropharyngeal exudate or uvula swelling. Tonsils: 2+ on the right. 2+ on the left. Eyes:      Conjunctiva/sclera: Conjunctivae normal.   Cardiovascular:      Rate and Rhythm: Normal rate and regular rhythm. Pulses: Normal pulses. Heart sounds: Normal heart sounds. Pulmonary:      Effort: Pulmonary effort is normal.      Breath sounds: Normal breath sounds. Musculoskeletal:      Cervical back: Normal range of motion and neck supple. Skin:     General: Skin is warm and dry. Neurological:      Mental Status: He is alert and oriented to person, place, and time. Psychiatric:         Mood and Affect: Mood normal.         Behavior: Behavior normal. Behavior is cooperative. Thought Content:  Thought content normal.         Judgment: Judgment normal.

## 2023-08-08 NOTE — PATIENT INSTRUCTIONS
Take medications as prescribed for next 5 days. Symptoms of a viral infection may linger for up to 10 days. Your contagious period is the first 5-7 days of symptom onset. Continue over-the-counter products for symptoms: tylenol for fevers, ibuprofen for body aches, flonase (fluticasone) with nasal saline and sudafed for nasal congestion, and airborne/emergen-c for vitamin supplementation. Follow-up with PCP in 3-5 days if no improvement of symptoms. Report to ER if symptoms worsen.

## 2023-08-30 ENCOUNTER — OFFICE VISIT (OUTPATIENT)
Age: 38
End: 2023-08-30
Payer: COMMERCIAL

## 2023-08-30 VITALS
DIASTOLIC BLOOD PRESSURE: 87 MMHG | WEIGHT: 242 LBS | SYSTOLIC BLOOD PRESSURE: 149 MMHG | HEIGHT: 74 IN | BODY MASS INDEX: 31.06 KG/M2

## 2023-08-30 DIAGNOSIS — M75.42 IMPINGEMENT SYNDROME OF LEFT SHOULDER: ICD-10-CM

## 2023-08-30 DIAGNOSIS — M99.02 SEGMENTAL DYSFUNCTION OF THORACIC REGION: ICD-10-CM

## 2023-08-30 DIAGNOSIS — M99.01 SEGMENTAL DYSFUNCTION OF CERVICAL REGION: Primary | ICD-10-CM

## 2023-08-30 DIAGNOSIS — M62.838 MUSCLE SPASM: ICD-10-CM

## 2023-08-30 PROCEDURE — 98940 CHIROPRACT MANJ 1-2 REGIONS: CPT | Performed by: CHIROPRACTOR

## 2023-08-30 PROCEDURE — 99211 OFF/OP EST MAY X REQ PHY/QHP: CPT | Performed by: CHIROPRACTOR

## 2023-08-30 PROCEDURE — 97110 THERAPEUTIC EXERCISES: CPT | Performed by: CHIROPRACTOR

## 2023-08-30 NOTE — PROGRESS NOTES
Initial date of service: 8/7/23    Diagnoses and all orders for this visit:    Segmental dysfunction of cervical region    Impingement syndrome of left shoulder - Right    Segmental dysfunction of thoracic region    Muscle spasm      Pt improved with reduced pain/paresthesia. R leg pain consistent with adhesion and scar tissue from prior gastroc strain; reviewed stretches and foam rolling technique    TREATMENT:  [id number]  Ther-ex: IASTM - discussed post procedure soreness and/or ecchymosis for up to 36 hrs, applied to affected mm hypertonicities; wall peyman, axial retraction, upper trap stretch, lev scap stretch, SCM stretch, pec minor stretch, lat rows with t-band, lat pull down with t-band, abdominal bracing; greater than 15 min spent performing above mentioned ther-ex to improve ROM/flexibility. Thoracic mobilization/manipulation: prone P-A mob, supine A-P manip; cervical mobilization/manipulation: diversified supine graded mobilization, cervical traction    HPI:  Juanjose Ralph is a 41 y.o. male   Chief Complaint   Patient presents with   • Shoulder Pain     Left shoulder feeling much better no pain    • Hand Pain     Numbness in left hand, middle pointer and thumb. Started end of June comes and goes but wanted to make a note of it       Pt presents for tx forneck/ubp into LUE 1st digits onset end of June 2023 after stretching to try to touch toes after a run; felt a pull/pop in R shoulder. Pt went to PCP who prescribed oral steroid and referred here. Pt works on computer  8/30: Pt reports feeling and moving better since last tx; N/T resolved since last tx. Pt also inquires about R leg pain several months after a strain that only bothers him when transitioning up out of chair    Neck Pain   This is a new problem. The current episode started more than 1 month ago. The problem occurs daily. The problem has been waxing and waning. The pain is present in the midline and left side.  The quality of the pain is described as aching (N/T). Exacerbated by: being on phone in bed, swinging a bat; alleviated by moving hand. The pain is worse during the day. He has tried ice and NSAIDs for the symptoms. Shoulder Pain     Hand Pain       Past Medical History:   Diagnosis Date   • GERD (gastroesophageal reflux disease)    • History of stomach ulcers    • Male infertility, unspecified     Varicocele   • Moderate episode of recurrent major depressive disorder ([address]) 5/5/2021   • Sleep apnea, obstructive       The following portions of the patient's history were reviewed and updated as appropriate: allergies, past family history, past medical history, past social history, past surgical history, and problem list.  Review of Systems   Musculoskeletal: Positive for neck pain. Physical Exam  Neck:        Comments: C/S Pnful and limited in ext/Lrot  L sh pnful and limited in abduction, int rotation  Musculoskeletal:      Cervical back: Pain with movement and muscular tenderness present. Decreased range of motion. Thoracic back: Spasms and tenderness present. Decreased range of motion. Back:    Lymphadenopathy:      Cervical: No cervical adenopathy. Neurological:      Mental Status: He is alert and oriented to person, place, and time. Gait: Gait is intact. Psychiatric:         Mood and Affect: Mood and affect normal.       SOFT TISSUE ASSESSMENT: Hypertonicity and tenderness palpated C5-T6 erector spinae, L upper trap, L lev scap, L scalene,L  rhomboid, L pec minor, L brachioradialis JOINT RECTRICTIONS: C5-T6     Return in about 1 week (around 9/6/2023) for Next scheduled follow up.

## 2023-10-09 ENCOUNTER — OFFICE VISIT (OUTPATIENT)
Dept: FAMILY MEDICINE CLINIC | Facility: CLINIC | Age: 38
End: 2023-10-09
Payer: COMMERCIAL

## 2023-10-09 VITALS
DIASTOLIC BLOOD PRESSURE: 100 MMHG | BODY MASS INDEX: 31.44 KG/M2 | HEIGHT: 74 IN | RESPIRATION RATE: 18 BRPM | OXYGEN SATURATION: 98 % | HEART RATE: 78 BPM | WEIGHT: 245 LBS | TEMPERATURE: 98.1 F | SYSTOLIC BLOOD PRESSURE: 130 MMHG

## 2023-10-09 DIAGNOSIS — Z23 NEED FOR VACCINATION: ICD-10-CM

## 2023-10-09 DIAGNOSIS — Z00.00 ANNUAL PHYSICAL EXAM: ICD-10-CM

## 2023-10-09 DIAGNOSIS — F41.9 ANXIETY: ICD-10-CM

## 2023-10-09 PROCEDURE — 90686 IIV4 VACC NO PRSV 0.5 ML IM: CPT

## 2023-10-09 PROCEDURE — 90471 IMMUNIZATION ADMIN: CPT

## 2023-10-09 PROCEDURE — 99395 PREV VISIT EST AGE 18-39: CPT | Performed by: FAMILY MEDICINE

## 2023-10-09 RX ORDER — FLUVOXAMINE MALEATE 100 MG
100 TABLET ORAL
Qty: 90 TABLET | Refills: 3 | Status: SHIPPED | OUTPATIENT
Start: 2023-10-09

## 2023-10-09 NOTE — PROGRESS NOTES
[address]    NAME: Harpal Matthew  AGE: 41 y.o. SEX: male  : 1985     DATE: 10/9/2023     Assessment and Plan:     Problem List Items Addressed This Visit        Other    Anxiety    Relevant Medications    fluvoxaMINE (LUVOX) 100 mg tablet    BMI 31.0-31.9,adult - Primary   Other Visit Diagnoses     Annual physical exam        Need for vaccination        Relevant Orders    influenza vaccine, quadrivalent, 0.5 mL, preservative-free, for adult and pediatric patients 6 mos+ (AFLURIA, FLUARIX, FLULAVAL, FLUZONE) (Completed)          Immunizations and preventive care screenings were discussed with patient today. Appropriate education was printed on patient's after visit summary. Counseling:  Dental Health: discussed importance of regular tooth brushing, flossing, and dental visits. Depression Screening and Follow-up Plan: Patient was screened for depression during today's encounter. They screened negative with a PHQ-2 score of 0. Return in 6 months (on 2024). Chief Complaint:     Chief Complaint   Patient presents with   • Follow-up      History of Present Illness:     Adult Annual Physical   Patient here for a comprehensive physical exam. The patient reports no problems. Diet and Physical Activity  Diet/Nutrition: well balanced diet. Exercise: no formal exercise. Depression Screening  PHQ-2/9 Depression Screening    Little interest or pleasure in doing things: 0 - not at all  Feeling down, depressed, or hopeless: 0 - not at all  PHQ-2 Score: 0  PHQ-2 Interpretation: Negative depression screen       General Health  Sleep: sleeps well. Hearing: normal - bilateral.  Vision: no vision problems. Dental: regular dental visits.  Health  History of STDs?: no.     Review of Systems:     Review of Systems   Constitutional: Negative for chills, fatigue and fever.    HENT: Negative for congestion, ear pain, hearing loss, postnasal drip, rhinorrhea and sore throat. Eyes: Negative for pain and visual disturbance. Respiratory: Negative for chest tightness, shortness of breath and wheezing. Cardiovascular: Negative for chest pain and leg swelling. Gastrointestinal: Negative for abdominal distention, abdominal pain, constipation, diarrhea and vomiting. Endocrine: Negative for cold intolerance and heat intolerance. Genitourinary: Negative for difficulty urinating, frequency and urgency. Musculoskeletal: Negative for arthralgias and gait problem. Skin: Negative for color change. Neurological: Negative for dizziness, tremors, syncope, numbness and headaches. Hematological: Negative for adenopathy. Psychiatric/Behavioral: Negative for agitation, confusion and sleep disturbance. The patient is not nervous/anxious. Past Medical History:     Past Medical History:   Diagnosis Date   • GERD (gastroesophageal reflux disease)    • History of stomach ulcers    • Male infertility, unspecified     Varicocele   • Moderate episode of recurrent major depressive disorder ([address]) 5/5/2021   • Sleep apnea, obstructive       Past Surgical History:     Past Surgical History:   Procedure Laterality Date   • EYE SURGERY Left    • NASAL SEPTUM SURGERY N/A    • NASAL SEPTUM SURGERY     • MT ESOPHAGOGASTRODUODENOSCOPY TRANSORAL DIAGNOSTIC N/A 3/25/2019    Procedure: ESOPHAGOGASTRODUODENOSCOPY (EGD); Surgeon: Roxy Connor DO;  Location: [facility]; Service: Gastroenterology      Social History:     Social History     Socioeconomic History   • Marital status: /Civil Union     Spouse name: None   • Number of children: None   • Years of education: None   • Highest education level: None   Occupational History   • None   Tobacco Use   • Smoking status: Never   • Smokeless tobacco: Never   Vaping Use   • Vaping Use: Never used   Substance and Sexual Activity   • Alcohol use:  Yes Comment: social-holidyas   • Drug use: Never   • Sexual activity: None   Other Topics Concern   • None   Social History Narrative   • None     Social Determinants of Health     Financial Resource Strain: Not on file   Food Insecurity: Not on file   Transportation Needs: Not on file   Physical Activity: Not on file   Stress: Not on file   Social Connections: Not on file   Intimate Partner Violence: Not on file   Housing Stability: Not on file      Family History:     Family History   Problem Relation Age of Onset   • Hypothyroidism Mother    • Heart disease Father       Current Medications:     Current Outpatient Medications   Medication Sig Dispense Refill   • esomeprazole (NexIUM) 40 MG capsule TAKE ONE CAPSULE BY MOUTH EVERY DAY 90 capsule 0   • fluticasone (FLONASE) 50 mcg/act nasal spray USE 1 SPRAY IN EACH NOSTRIL ONCE A DAY 16 g 1   • fluvoxaMINE (LUVOX) 100 mg tablet Take 1 tablet (100 mg total) by mouth daily at bedtime 90 tablet 3   • meclizine (ANTIVERT) 25 mg tablet TAKE ONE TABLET BY MOUTH THREE TIMES A DAY AS NEEDED FOR DIZINESS 30 tablet 0   • albuterol (PROVENTIL HFA,VENTOLIN HFA) 90 mcg/act inhaler Inhale 2 puffs every 6 (six) hours as needed for wheezing 8 g 0     No current facility-administered medications for this visit. Allergies:     No Known Allergies   Physical Exam:     /100   Pulse 78   Temp 98.1 °F (36.7 °C)   Resp 18   Ht 6' 2" (1.88 m)   Wt 111 kg (245 lb)   SpO2 98%   BMI 31.46 kg/m²     Physical Exam  Constitutional:       Appearance: He is well-developed. HENT:      Head: Normocephalic. Right Ear: External ear normal.      Left Ear: External ear normal.      Nose: Nose normal.   Eyes:      Conjunctiva/sclera: Conjunctivae normal.      Pupils: Pupils are equal, round, and reactive to light. Neck:      Thyroid: No thyromegaly. Cardiovascular:      Rate and Rhythm: Normal rate and regular rhythm. Heart sounds: Normal heart sounds.    Pulmonary: Effort: Pulmonary effort is normal.      Breath sounds: Normal breath sounds. Abdominal:      General: Bowel sounds are normal.      Palpations: Abdomen is soft. Musculoskeletal:         General: Normal range of motion. Cervical back: Normal range of motion. Skin:     General: Skin is warm and dry. Neurological:      Mental Status: He is alert and oriented to person, place, and time.    Psychiatric:         Behavior: Behavior normal.          Gilbert Lane DO   [facility] [address]

## 2023-10-25 DIAGNOSIS — K29.00 OTHER ACUTE GASTRITIS WITHOUT HEMORRHAGE: ICD-10-CM

## 2023-10-26 RX ORDER — ESOMEPRAZOLE MAGNESIUM 40 MG/1
CAPSULE, DELAYED RELEASE ORAL
Qty: 90 CAPSULE | Refills: 0 | Status: SHIPPED | OUTPATIENT
Start: 2023-10-26

## 2023-12-13 DIAGNOSIS — R42 DIZZINESS: ICD-10-CM

## 2023-12-13 RX ORDER — MECLIZINE HYDROCHLORIDE 25 MG/1
25 TABLET ORAL 3 TIMES DAILY PRN
Qty: 30 TABLET | Refills: 0 | Status: SHIPPED | OUTPATIENT
Start: 2023-12-13

## 2024-01-26 DIAGNOSIS — K29.00 OTHER ACUTE GASTRITIS WITHOUT HEMORRHAGE: ICD-10-CM

## 2024-01-26 RX ORDER — ESOMEPRAZOLE MAGNESIUM 40 MG/1
CAPSULE, DELAYED RELEASE ORAL
Qty: 90 CAPSULE | Refills: 0 | Status: SHIPPED | OUTPATIENT
Start: 2024-01-26

## 2024-04-15 ENCOUNTER — OFFICE VISIT (OUTPATIENT)
Dept: FAMILY MEDICINE CLINIC | Facility: CLINIC | Age: 39
End: 2024-04-15
Payer: COMMERCIAL

## 2024-04-15 VITALS
HEIGHT: 74 IN | OXYGEN SATURATION: 99 % | SYSTOLIC BLOOD PRESSURE: 126 MMHG | DIASTOLIC BLOOD PRESSURE: 82 MMHG | WEIGHT: 250 LBS | BODY MASS INDEX: 32.08 KG/M2 | HEART RATE: 111 BPM

## 2024-04-15 DIAGNOSIS — M79.645 CHRONIC PAIN OF LEFT THUMB: ICD-10-CM

## 2024-04-15 DIAGNOSIS — F41.9 ANXIETY: Primary | ICD-10-CM

## 2024-04-15 DIAGNOSIS — G89.29 CHRONIC PAIN OF LEFT THUMB: ICD-10-CM

## 2024-04-15 PROCEDURE — 99213 OFFICE O/P EST LOW 20 MIN: CPT | Performed by: FAMILY MEDICINE

## 2024-04-15 RX ORDER — HYDROXYZINE PAMOATE 25 MG/1
25 CAPSULE ORAL 4 TIMES DAILY PRN
Qty: 30 CAPSULE | Refills: 0 | Status: SHIPPED | OUTPATIENT
Start: 2024-04-15

## 2024-04-15 NOTE — PROGRESS NOTES
"Name: Elie Thomas      : 1985      MRN: [id number]  Encounter Provider: Elie Zuleta DO  Encounter Date: 4/15/2024   Encounter department: [facility] [address]    Assessment & Plan     1. Anxiety  Assessment & Plan:  Continue fluvoxamine, add hydroxyzine as needed.  Recheck in 6 months.    Orders:  -     hydrOXYzine pamoate (VISTARIL) 25 mg capsule; Take 1 capsule (25 mg total) by mouth 4 (four) times a day as needed for anxiety    2. Chronic pain of left thumb  -     Ambulatory Referral to Orthopedic Surgery; Future         Subjective      Patient comes in today for follow-up, states that he feels that the fluvoxamine is working.  He does state though that he has a \"short fuse\".  He is trying to make a career change which he feels will help as he attributes the majority of his anxiety to his work.      Review of Systems   Constitutional:  Negative for chills, fatigue and fever.   HENT:  Negative for congestion, ear pain, hearing loss, postnasal drip, rhinorrhea and sore throat.    Eyes:  Negative for pain and visual disturbance.   Respiratory:  Negative for chest tightness, shortness of breath and wheezing.    Cardiovascular:  Negative for chest pain and leg swelling.   Gastrointestinal:  Negative for abdominal distention, abdominal pain, constipation, diarrhea and vomiting.   Endocrine: Negative for cold intolerance and heat intolerance.   Genitourinary:  Negative for difficulty urinating, frequency and urgency.   Musculoskeletal:  Negative for arthralgias and gait problem.   Skin:  Negative for color change.   Neurological:  Negative for dizziness, tremors, syncope, numbness and headaches.   Hematological:  Negative for adenopathy.   Psychiatric/Behavioral:  Negative for agitation, confusion and sleep disturbance. The patient is nervous/anxious.        Current Outpatient Medications on File Prior to Visit   Medication Sig   • albuterol (PROVENTIL HFA,VENTOLIN " "HFA) 90 mcg/act inhaler Inhale 2 puffs every 6 (six) hours as needed for wheezing   • esomeprazole (NexIUM) 40 MG capsule TAKE ONE CAPSULE BY MOUTH EVERY DAY   • fluticasone (FLONASE) 50 mcg/act nasal spray USE 1 SPRAY IN EACH NOSTRIL ONCE A DAY   • fluvoxaMINE (LUVOX) 100 mg tablet Take 1 tablet (100 mg total) by mouth daily at bedtime   • meclizine (ANTIVERT) 25 mg tablet TAKE ONE TABLET BY MOUTH THREE TIMES A DAY AS NEEDED FOR DIZZINESS       Objective     /82   Pulse (!) 111   Ht 6' 2\" (1.88 m)   Wt 113 kg (250 lb)   SpO2 99%   BMI 32.10 kg/m²     Physical Exam  Vitals and nursing note reviewed.   Constitutional:       General: He is not in acute distress.     Appearance: Normal appearance. He is well-developed.   HENT:      Head: Normocephalic.   Eyes:      General: No scleral icterus.     Conjunctiva/sclera: Conjunctivae normal.   Neck:      Thyroid: No thyromegaly.   Cardiovascular:      Rate and Rhythm: Normal rate and regular rhythm.      Heart sounds: Normal heart sounds. No murmur heard.  Pulmonary:      Effort: Pulmonary effort is normal. No respiratory distress.      Breath sounds: Normal breath sounds. No wheezing.   Abdominal:      General: Bowel sounds are normal. There is no distension.      Palpations: Abdomen is soft.      Tenderness: There is no abdominal tenderness.   Musculoskeletal:         General: No tenderness. Normal range of motion.      Cervical back: Normal range of motion.      Right lower leg: No edema.      Left lower leg: No edema.   Lymphadenopathy:      Cervical: No cervical adenopathy.   Skin:     General: Skin is warm and dry.      Coloration: Skin is not pale.      Findings: No rash.   Neurological:      Mental Status: He is alert and oriented to person, place, and time.      Cranial Nerves: No cranial nerve deficit.   Psychiatric:         Behavior: Behavior normal.       Elie Zuleta,     "

## 2024-04-29 ENCOUNTER — OFFICE VISIT (OUTPATIENT)
Dept: OBGYN CLINIC | Facility: CLINIC | Age: 39
End: 2024-04-29
Payer: COMMERCIAL

## 2024-04-29 VITALS
WEIGHT: 250 LBS | SYSTOLIC BLOOD PRESSURE: 166 MMHG | HEIGHT: 74 IN | DIASTOLIC BLOOD PRESSURE: 118 MMHG | BODY MASS INDEX: 32.08 KG/M2 | HEART RATE: 106 BPM

## 2024-04-29 DIAGNOSIS — M65.312 TRIGGER FINGER OF LEFT THUMB: Primary | ICD-10-CM

## 2024-04-29 DIAGNOSIS — G89.29 CHRONIC PAIN OF LEFT THUMB: ICD-10-CM

## 2024-04-29 DIAGNOSIS — M79.645 CHRONIC PAIN OF LEFT THUMB: ICD-10-CM

## 2024-04-29 PROCEDURE — 20550 NJX 1 TENDON SHEATH/LIGAMENT: CPT | Performed by: ORTHOPAEDIC SURGERY

## 2024-04-29 PROCEDURE — 99203 OFFICE O/P NEW LOW 30 MIN: CPT | Performed by: ORTHOPAEDIC SURGERY

## 2024-04-29 RX ORDER — BETAMETHASONE SODIUM PHOSPHATE AND BETAMETHASONE ACETATE 3; 3 MG/ML; MG/ML
3 INJECTION, SUSPENSION INTRA-ARTICULAR; INTRALESIONAL; INTRAMUSCULAR; SOFT TISSUE
Status: COMPLETED | OUTPATIENT
Start: 2024-04-29 | End: 2024-04-29

## 2024-04-29 RX ORDER — BUPIVACAINE HYDROCHLORIDE 2.5 MG/ML
0.5 INJECTION, SOLUTION INFILTRATION; PERINEURAL
Status: COMPLETED | OUTPATIENT
Start: 2024-04-29 | End: 2024-04-29

## 2024-04-29 RX ADMIN — BUPIVACAINE HYDROCHLORIDE 0.5 ML: 2.5 INJECTION, SOLUTION INFILTRATION; PERINEURAL at 14:30

## 2024-04-29 RX ADMIN — BETAMETHASONE SODIUM PHOSPHATE AND BETAMETHASONE ACETATE 3 MG: 3; 3 INJECTION, SUSPENSION INTRA-ARTICULAR; INTRALESIONAL; INTRAMUSCULAR; SOFT TISSUE at 14:30

## 2024-04-29 NOTE — PROGRESS NOTES
Assessment/Plan:   Diagnoses and all orders for this visit:    Trigger finger of left thumb  -     XR thumb left first digit-min 2v; Future  -     Hand/upper extremity injection: L thumb A1    Chronic pain of left thumb  -     Ambulatory Referral to Orthopedic Surgery  -     Hand/upper extremity injection: L thumb A1         Reviewed physical exam and imaging with patient at time of visit. His exam is consistent with trigger thumb of his left hand. Discussed treatment options with patient at time of visit.He was offered and accepted an injection(s) of Celestone and Marcaine to his Left thumb A1 Pulley(s) for symptomatic relief of pain and inflammation. Patient tolerated the treatment(s) well. Ice and post injection protocol advised. Weightbearing activities as tolerated. He will be seen for follow-up 8 weeks for re-evaluation and consideration for repeat injections as necessary. Patient expresses understanding and is in agreement with this treatment plan. The patient was given the opportunity to ask questions or present concerns.    The patient has a left trigger thumb with a hypertrophic A1 pulley.  This was injected with Celestone and Marcaine.  He tolerated procedure quite well.  Return back in 2 months for reevaluation      Subjective:   Patient ID: Elie Thomas  1985     HPI  Patient is a 38 y.o. male who presents for initial evaluation of his left hand. The patient reports pain on the volar aspect, at the base of the thumb. He reports pain for several weeks, with his symptoms worsening over time. He reports triggering and locking in his thumb as well. He states that the triggering happens with repetitive activities. He reports stiffness in the morning which worsens at night. He denies numbness or tingling.     The following portions of the patient's history were reviewed and updated as appropriate:  Past medical history, past surgical history, Family history, social history, current medications and  allergies    Past Medical History:   Diagnosis Date    GERD (gastroesophageal reflux disease)     History of stomach ulcers     Male infertility, unspecified     Varicocele    Moderate episode of recurrent major depressive disorder (HCC) 5/5/2021    Sleep apnea, obstructive        Past Surgical History:   Procedure Laterality Date    EYE SURGERY Left     NASAL SEPTUM SURGERY N/A     NASAL SEPTUM SURGERY      [place] ESOPHAGOGASTRODUODENOSCOPY TRANSORAL DIAGNOSTIC N/A 3/25/2019    Procedure: ESOPHAGOGASTRODUODENOSCOPY (EGD);  Surgeon: Cesar Zuluaga DO;  Location: [facility];  Service: Gastroenterology       Family History   Problem Relation Age of Onset    Hypothyroidism Mother     Heart disease Father        Social History     Socioeconomic History    Marital status: /Civil Union     Spouse name: None    Number of children: None    Years of education: None    Highest education level: None   Occupational History    None   Tobacco Use    Smoking status: Never    Smokeless tobacco: Never   Vaping Use    Vaping status: Never Used   Substance and Sexual Activity    Alcohol use: Yes     Comment: social-holidyas    Drug use: Never    Sexual activity: None   Other Topics Concern    None   Social History Narrative    None     Social Determinants of Health     Financial Resource Strain: Not on file   Food Insecurity: Not on file   Transportation Needs: Not on file   Physical Activity: Not on file   Stress: Not on file   Social Connections: Not on file   Intimate Partner Violence: Not on file   Housing Stability: Not on file         Current Outpatient Medications:     albuterol (PROVENTIL HFA,VENTOLIN HFA) 90 mcg/act inhaler, Inhale 2 puffs every 6 (six) hours as needed for wheezing, Disp: 8 g, Rfl: 0    esomeprazole (NexIUM) 40 MG capsule, TAKE ONE CAPSULE BY MOUTH EVERY DAY, Disp: 90 capsule, Rfl: 0    fluticasone (FLONASE) 50 mcg/act nasal spray, USE 1 SPRAY IN EACH NOSTRIL ONCE A DAY, Disp: 16 g, Rfl: 1    fluvoxaMINE  "(LUVOX) 100 mg tablet, Take 1 tablet (100 mg total) by mouth daily at bedtime, Disp: 90 tablet, Rfl: 3    hydrOXYzine pamoate (VISTARIL) 25 mg capsule, Take 1 capsule (25 mg total) by mouth 4 (four) times a day as needed for anxiety, Disp: 30 capsule, Rfl: 0    meclizine (ANTIVERT) 25 mg tablet, TAKE ONE TABLET BY MOUTH THREE TIMES A DAY AS NEEDED FOR DIZZINESS, Disp: 30 tablet, Rfl: 0    No Known Allergies    Review of Systems   Constitutional:  Negative for chills and fever.   HENT:  Negative for ear pain and sore throat.    Eyes:  Negative for pain and visual disturbance.   Respiratory:  Negative for cough and shortness of breath.    Cardiovascular:  Negative for chest pain and palpitations.   Gastrointestinal:  Negative for abdominal pain and vomiting.   Genitourinary:  Negative for dysuria and hematuria.   Musculoskeletal:  Negative for arthralgias and back pain.   Skin:  Negative for color change and rash.   Neurological:  Negative for seizures and syncope.   All other systems reviewed and are negative.       Objective:  BP (!) 166/118 (BP Location: Left arm, Patient Position: Sitting, Cuff Size: Large)   Pulse (!) 106   Ht 6' 2\" (1.88 m)   Wt 113 kg (250 lb)   BMI 32.10 kg/m²     Ortho Exam  left Hand -    Patient presents with no obvious anatomical deformity  Skin is warm and dry to touch with no signs of erythema, ecchymosis, or infection  No soft tissue swelling or effusion noted  Full FDS, FDP, extensor mechanisms are intact  No rotational deformity with composite finger flexion  TTP with palpable nodule in the thumb finger flexor tendon local to A1 Pulley  Reproducible triggering on exam  Demonstrates normal wrist, elbow, and shoulder motion  Forearm compartments are soft and supple  2+ distal radial pulse with brisk capillary refill to the fingers  Radial, median, and ulnar motor and sensory distribution intact  Sensations light to touch intact distally      Physical Exam  HENT:      Head: " Normocephalic and atraumatic.      Nose: Nose normal.   Eyes:      Conjunctiva/sclera: Conjunctivae normal.   Cardiovascular:      Rate and Rhythm: Normal rate.   Pulmonary:      Effort: Pulmonary effort is normal.   Musculoskeletal:      Cervical back: Neck supple.   Skin:     General: Skin is warm and dry.      Capillary Refill: Capillary refill takes less than 2 seconds.   Neurological:      Mental Status: He is alert and oriented to person, place, and time.   Psychiatric:         Mood and Affect: Mood normal.         Behavior: Behavior normal.          Diagnostic Test Review:  X-Ray of left thumb obtained on 4/29/2024 were reviewed and demonstrate no acute osseous abnormalities.      Hand/upper extremity injection: L thumb A1  Universal Protocol:  Consent: Verbal consent obtained.  Risks and benefits: risks, benefits and alternatives were discussed  Consent given by: patient  Timeout called at: 4/29/2024 2:50 PM.  Patient understanding: patient states understanding of the procedure being performed  Site marked: the operative site was marked  Radiology Images displayed and confirmed. If images not available, report reviewed: imaging studies available  Patient identity confirmed: verbally with patient  Supporting Documentation  Indications: joint swelling and pain   Procedure Details  Condition:trigger finger Location: thumb - L thumb A1   Preparation: Patient was prepped and draped in the usual sterile fashion  Needle size: 25 G  Ultrasound guidance: no  Medications administered: 0.5 mL bupivacaine 0.25 %; 3 mg betamethasone acetate-betamethasone sodium phosphate 6 (3-3) mg/mL  Patient tolerance: patient tolerated the procedure well with no immediate complications  Dressing:  Sterile dressing applied              Scribe Attestation      I,:  Kyle Spangler am acting as a scribe while in the presence of the attending physician.:       I,:  Logan Stewart DO personally performed the services described in  this documentation    as scribed in my presence.:

## 2024-06-11 DIAGNOSIS — K29.00 OTHER ACUTE GASTRITIS WITHOUT HEMORRHAGE: ICD-10-CM

## 2024-06-12 RX ORDER — ESOMEPRAZOLE MAGNESIUM 40 MG/1
CAPSULE, DELAYED RELEASE ORAL
Qty: 90 CAPSULE | Refills: 0 | Status: SHIPPED | OUTPATIENT
Start: 2024-06-12

## 2024-06-14 ENCOUNTER — OFFICE VISIT (OUTPATIENT)
Dept: OBGYN CLINIC | Facility: CLINIC | Age: 39
End: 2024-06-14
Payer: COMMERCIAL

## 2024-06-14 VITALS
WEIGHT: 250 LBS | HEART RATE: 65 BPM | BODY MASS INDEX: 32.08 KG/M2 | SYSTOLIC BLOOD PRESSURE: 150 MMHG | HEIGHT: 74 IN | DIASTOLIC BLOOD PRESSURE: 99 MMHG

## 2024-06-14 DIAGNOSIS — M65.312 TRIGGER FINGER OF LEFT THUMB: Primary | ICD-10-CM

## 2024-06-14 PROCEDURE — 99213 OFFICE O/P EST LOW 20 MIN: CPT | Performed by: ORTHOPAEDIC SURGERY

## 2024-06-14 NOTE — PROGRESS NOTES
Assessment/Plan:   Diagnoses and all orders for this visit:    Trigger finger of left thumb           Patient has resolution of symptoms regarding left trigger thumb following CSI 2 months ago. If his symptoms return he can call for another CSI vs scheduling left trigger thumb release. See back as needed.     The patient's trigger thumb symptomatology has fully abated.  Would recommend continuation of home exercise program.  Follow-up on an as-needed basis.  If his condition changes, he would not hesitate to let us know      Subjective:   Patient ID: Elie Thomas  1985     HPI  Patient is a 39 y.o. male who presents for f/u regarding his left trigger thumb. He was given CSI 2 months ago with complete resolution of symptoms. No pain, clicking, triggering, locking. He has resumed all normal activities.     The following portions of the patient's history were reviewed and updated as appropriate:  Past medical history, past surgical history, Family history, social history, current medications and allergies    Past Medical History:   Diagnosis Date    GERD (gastroesophageal reflux disease)     History of stomach ulcers     Male infertility, unspecified     Varicocele    Moderate episode of recurrent major depressive disorder (HCC) 5/5/2021    Sleep apnea, obstructive        Past Surgical History:   Procedure Laterality Date    EYE SURGERY Left     NASAL SEPTUM SURGERY N/A     NASAL SEPTUM SURGERY      [place] ESOPHAGOGASTRODUODENOSCOPY TRANSORAL DIAGNOSTIC N/A 3/25/2019    Procedure: ESOPHAGOGASTRODUODENOSCOPY (EGD);  Surgeon: Cesar Zuluaga DO;  Location: [facility];  Service: Gastroenterology       Family History   Problem Relation Age of Onset    Hypothyroidism Mother     Heart disease Father        Social History     Socioeconomic History    Marital status: /Civil Union     Spouse name: None    Number of children: None    Years of education: None    Highest education level: None   Occupational History    None    Tobacco Use    Smoking status: Never    Smokeless tobacco: Never   Vaping Use    Vaping status: Never Used   Substance and Sexual Activity    Alcohol use: Yes     Comment: social-holidyas    Drug use: Never    Sexual activity: None   Other Topics Concern    None   Social History Narrative    None     Social Determinants of Health     Financial Resource Strain: Not on file   Food Insecurity: Not on file   Transportation Needs: Not on file   Physical Activity: Not on file   Stress: Not on file   Social Connections: Not on file   Intimate Partner Violence: Not on file   Housing Stability: Not on file         Current Outpatient Medications:     albuterol (PROVENTIL HFA,VENTOLIN HFA) 90 mcg/act inhaler, Inhale 2 puffs every 6 (six) hours as needed for wheezing, Disp: 8 g, Rfl: 0    esomeprazole (NexIUM) 40 MG capsule, TAKE ONE CAPSULE BY MOUTH EVERY DAY, Disp: 90 capsule, Rfl: 0    fluticasone (FLONASE) 50 mcg/act nasal spray, USE 1 SPRAY IN EACH NOSTRIL ONCE A DAY, Disp: 16 g, Rfl: 1    fluvoxaMINE (LUVOX) 100 mg tablet, Take 1 tablet (100 mg total) by mouth daily at bedtime, Disp: 90 tablet, Rfl: 3    hydrOXYzine pamoate (VISTARIL) 25 mg capsule, Take 1 capsule (25 mg total) by mouth 4 (four) times a day as needed for anxiety, Disp: 30 capsule, Rfl: 0    meclizine (ANTIVERT) 25 mg tablet, TAKE ONE TABLET BY MOUTH THREE TIMES A DAY AS NEEDED FOR DIZZINESS, Disp: 30 tablet, Rfl: 0    No Known Allergies    Review of Systems   Constitutional:  Negative for chills and fever.   HENT:  Negative for ear pain and sore throat.    Eyes:  Negative for pain and visual disturbance.   Respiratory:  Negative for cough and shortness of breath.    Cardiovascular:  Negative for chest pain and palpitations.   Gastrointestinal:  Negative for abdominal pain and vomiting.   Genitourinary:  Negative for dysuria and hematuria.   Musculoskeletal:  Negative for arthralgias and back pain.   Skin:  Negative for color change and rash.  "  Neurological:  Negative for seizures and syncope.   All other systems reviewed and are negative.       Objective:  /99 (BP Location: Left arm, Patient Position: Sitting, Cuff Size: Large)   Pulse 65   Ht 6' 2\" (1.88 m)   Wt 113 kg (250 lb) Comment: reported  BMI 32.10 kg/m²     Ortho Exam  left Hand -    Patient presents with no obvious anatomical deformity  Skin is warm and dry to touch with no signs of erythema, ecchymosis, or infection  No soft tissue swelling or effusion noted  No tenderness over A-1 pulley, no palpable nodule or crepitation with motion of thumb.   Able to make composite fist  Hand intrinsics 5/5       Physical Exam  HENT:      Head: Normocephalic and atraumatic.      Nose: Nose normal.   Eyes:      Conjunctiva/sclera: Conjunctivae normal.   Cardiovascular:      Rate and Rhythm: Normal rate.   Pulmonary:      Effort: Pulmonary effort is normal.   Musculoskeletal:      Cervical back: Neck supple.   Skin:     General: Skin is warm and dry.      Capillary Refill: Capillary refill takes less than 2 seconds.   Neurological:      Mental Status: He is alert and oriented to person, place, and time.   Psychiatric:         Mood and Affect: Mood normal.         Behavior: Behavior normal.          No new imaging to review             Scribe Attestation      I,:  Axel Bower am acting as a scribe while in the presence of the attending physician.:       I,:  Logan Stewart, DO personally performed the services described in this documentation    as scribed in my presence.:              "

## 2024-09-15 ENCOUNTER — HOSPITAL ENCOUNTER (EMERGENCY)
Facility: HOSPITAL | Age: 39
Discharge: HOME | End: 2024-09-15
Attending: EMERGENCY MEDICINE
Payer: COMMERCIAL

## 2024-09-15 ENCOUNTER — OFFICE VISIT (OUTPATIENT)
Dept: URGENT CARE | Facility: CLINIC | Age: 39
End: 2024-09-15
Payer: COMMERCIAL

## 2024-09-15 VITALS
OXYGEN SATURATION: 96 % | WEIGHT: 255.95 LBS | HEIGHT: 74 IN | BODY MASS INDEX: 32.85 KG/M2 | TEMPERATURE: 98.6 F | SYSTOLIC BLOOD PRESSURE: 165 MMHG | RESPIRATION RATE: 18 BRPM | HEART RATE: 92 BPM | DIASTOLIC BLOOD PRESSURE: 100 MMHG

## 2024-09-15 VITALS
OXYGEN SATURATION: 96 % | RESPIRATION RATE: 18 BRPM | HEART RATE: 101 BPM | SYSTOLIC BLOOD PRESSURE: 160 MMHG | DIASTOLIC BLOOD PRESSURE: 110 MMHG

## 2024-09-15 DIAGNOSIS — R03.0 ELEVATED BLOOD PRESSURE READING: Primary | ICD-10-CM

## 2024-09-15 DIAGNOSIS — J34.89 SINUS PAIN: ICD-10-CM

## 2024-09-15 DIAGNOSIS — R11.0 NAUSEA: ICD-10-CM

## 2024-09-15 DIAGNOSIS — I10 HYPERTENSION: ICD-10-CM

## 2024-09-15 DIAGNOSIS — J34.89 SINUS PRESSURE: Primary | ICD-10-CM

## 2024-09-15 LAB
ANION GAP SERPL CALCULATED.3IONS-SCNC: 12 MMOL/L (ref 4–13)
ATRIAL RATE: 95 BPM
BASOPHILS # BLD AUTO: 0.04 THOUSANDS/ΜL (ref 0–0.1)
BASOPHILS NFR BLD AUTO: 1 % (ref 0–1)
BUN SERPL-MCNC: 12 MG/DL (ref 5–25)
CALCIUM SERPL-MCNC: 9.5 MG/DL (ref 8.4–10.2)
CARDIAC TROPONIN I PNL SERPL HS: 3 NG/L
CHLORIDE SERPL-SCNC: 100 MMOL/L (ref 96–108)
CO2 SERPL-SCNC: 25 MMOL/L (ref 21–32)
CREAT SERPL-MCNC: 0.84 MG/DL (ref 0.6–1.3)
EOSINOPHIL # BLD AUTO: 0.01 THOUSAND/ΜL (ref 0–0.61)
EOSINOPHIL NFR BLD AUTO: 0 % (ref 0–6)
ERYTHROCYTE [DISTWIDTH] IN BLOOD BY AUTOMATED COUNT: 13.1 % (ref 11.6–15.1)
GFR SERPL CREATININE-BSD FRML MDRD: 111 ML/MIN/1.73SQ M
GLUCOSE SERPL-MCNC: 128 MG/DL (ref 65–140)
HCT VFR BLD AUTO: 46.9 % (ref 36.5–49.3)
HGB BLD-MCNC: 15.7 G/DL (ref 12–17)
IMM GRANULOCYTES # BLD AUTO: 0.07 THOUSAND/UL (ref 0–0.2)
IMM GRANULOCYTES NFR BLD AUTO: 1 % (ref 0–2)
LYMPHOCYTES # BLD AUTO: 0.63 THOUSANDS/ΜL (ref 0.6–4.47)
LYMPHOCYTES NFR BLD AUTO: 8 % (ref 14–44)
MCH RBC QN AUTO: 27.5 PG (ref 26.8–34.3)
MCHC RBC AUTO-ENTMCNC: 33.5 G/DL (ref 31.4–37.4)
MCV RBC AUTO: 82 FL (ref 82–98)
MONOCYTES # BLD AUTO: 0.78 THOUSAND/ΜL (ref 0.17–1.22)
MONOCYTES NFR BLD AUTO: 10 % (ref 4–12)
NEUTROPHILS # BLD AUTO: 6.68 THOUSANDS/ΜL (ref 1.85–7.62)
NEUTS SEG NFR BLD AUTO: 80 % (ref 43–75)
NRBC BLD AUTO-RTO: 0 /100 WBCS
P AXIS: 76 DEGREES
PLATELET # BLD AUTO: 243 THOUSANDS/UL (ref 149–390)
PMV BLD AUTO: 10.1 FL (ref 8.9–12.7)
POTASSIUM SERPL-SCNC: 3.5 MMOL/L (ref 3.5–5.3)
PR INTERVAL: 140 MS
QRS AXIS: 76 DEGREES
QRSD INTERVAL: 96 MS
QT INTERVAL: 384 MS
QTC INTERVAL: 482 MS
RBC # BLD AUTO: 5.7 MILLION/UL (ref 3.88–5.62)
SODIUM SERPL-SCNC: 137 MMOL/L (ref 135–147)
T WAVE AXIS: 54 DEGREES
VENTRICULAR RATE: 95 BPM
WBC # BLD AUTO: 8.21 THOUSAND/UL (ref 4.31–10.16)

## 2024-09-15 PROCEDURE — 80048 BASIC METABOLIC PNL TOTAL CA: CPT | Performed by: EMERGENCY MEDICINE

## 2024-09-15 PROCEDURE — 85025 COMPLETE CBC W/AUTO DIFF WBC: CPT | Performed by: EMERGENCY MEDICINE

## 2024-09-15 PROCEDURE — 99284 EMERGENCY DEPT VISIT MOD MDM: CPT | Performed by: EMERGENCY MEDICINE

## 2024-09-15 PROCEDURE — 99213 OFFICE O/P EST LOW 20 MIN: CPT

## 2024-09-15 PROCEDURE — 96361 HYDRATE IV INFUSION ADD-ON: CPT

## 2024-09-15 PROCEDURE — 96376 TX/PRO/DX INJ SAME DRUG ADON: CPT

## 2024-09-15 PROCEDURE — 93005 ELECTROCARDIOGRAM TRACING: CPT

## 2024-09-15 PROCEDURE — 36415 COLL VENOUS BLD VENIPUNCTURE: CPT | Performed by: EMERGENCY MEDICINE

## 2024-09-15 PROCEDURE — 84484 ASSAY OF TROPONIN QUANT: CPT | Performed by: EMERGENCY MEDICINE

## 2024-09-15 PROCEDURE — 93010 ELECTROCARDIOGRAM REPORT: CPT | Performed by: INTERNAL MEDICINE

## 2024-09-15 PROCEDURE — 99284 EMERGENCY DEPT VISIT MOD MDM: CPT

## 2024-09-15 PROCEDURE — 96374 THER/PROPH/DIAG INJ IV PUSH: CPT

## 2024-09-15 RX ORDER — ONDANSETRON 2 MG/ML
4 INJECTION INTRAMUSCULAR; INTRAVENOUS ONCE
Status: COMPLETED | OUTPATIENT
Start: 2024-09-15 | End: 2024-09-15

## 2024-09-15 RX ORDER — ONDANSETRON 4 MG/1
4 TABLET, ORALLY DISINTEGRATING ORAL EVERY 6 HOURS PRN
Qty: 12 TABLET | Refills: 0 | Status: SHIPPED | OUTPATIENT
Start: 2024-09-15

## 2024-09-15 RX ADMIN — SODIUM CHLORIDE 1000 ML: 0.9 INJECTION, SOLUTION INTRAVENOUS at 14:54

## 2024-09-15 RX ADMIN — ONDANSETRON 4 MG: 2 INJECTION INTRAMUSCULAR; INTRAVENOUS at 14:52

## 2024-09-15 RX ADMIN — AMOXICILLIN AND CLAVULANATE POTASSIUM 1 TABLET: 875; 125 TABLET, FILM COATED ORAL at 16:16

## 2024-09-15 RX ADMIN — ONDANSETRON 4 MG: 2 INJECTION INTRAMUSCULAR; INTRAVENOUS at 16:10

## 2024-09-15 NOTE — DISCHARGE INSTRUCTIONS
Please follow up PCP and ENT. Recommend tylenol 650 mg and ibuprofen 600 mg every 6 hours as needed for pain. Please return for severe chest pain, significant shortness of breath, severely worsening symptoms, or any other concerning signs or symptoms. Please refer to the following documents for additional instructions and return precautions.

## 2024-09-15 NOTE — ED PROVIDER NOTES
1. Sinus pressure    2. Nausea    3. Sinus pain    4. Hypertension      ED Disposition       ED Disposition   Discharge    Condition   Stable    Date/Time   Sun Sep 15, 2024  3:52 PM    Comment   Elie Thomas discharge to home/self care.                   Assessment & Plan       Medical Decision Making  39-year-old male no significant reported past history presenting with multiple complaints.  Few months history of persistent sinus pressure and congestion with associated discharge concerning for possible sinus infection.  Will cover with antibiotics.  Patient reports history of anxiety and feeling anxious at urgent care.  Had similar event with attempted IV placement here in the ER.  Possible vagal component or anxiety component.  Plan for card evaluation including EKG troponin.  Basic labs.  Symptom management with IV fluids and nausea medication.  Reassess.    EKG interpreted by me with normal sinus rhythm and no acute ST abnormality.  Labs interpreted by me without significant acute process.  Symptoms improved with medications.  Given dose of antibiotics and prescription sent to pharmacy. Discussed results and recommendations. Advised follow up PCP. Medication recommendations. Given instructions and return precautions. Patient/family at bedside acknowledged understanding of all written and verbal instructions and return precautions. Discharged.     Amount and/or Complexity of Data Reviewed  Labs: ordered.    Risk  Prescription drug management.          HEART Risk Score      Flowsheet Row Most Recent Value   Heart Score Risk Calculator    History 0 Filed at: 09/15/2024 1650   ECG 0 Filed at: 09/15/2024 1650   Age 0 Filed at: 09/15/2024 1650   Risk Factors 1 Filed at: 09/15/2024 1650   Troponin 0 Filed at: 09/15/2024 1650   HEART Score 1 Filed at: 09/15/2024 1650                    Medications   sodium chloride 0.9 % bolus 1,000 mL (0 mL Intravenous Stopped 9/15/24 6684)   ondansetron (ZOFRAN) injection 4 mg (4  mg Intravenous Given 9/15/24 1452)   ondansetron (ZOFRAN) injection 4 mg (4 mg Intravenous Given 9/15/24 1610)   amoxicillin-clavulanate (AUGMENTIN) 875-125 mg per tablet 1 tablet (1 tablet Oral Given 9/15/24 1616)       History of Present Illness       39-year-old male no significant reported past history presenting with multiple complaints.  Patient reports that he went to urgent care earlier today for persistent sinus congestion and pressure despite multiple intranasal medications and allergy medicine for the last couple of months.  He also reports that he has been having some dry heaving without vomiting for the last week.  Denies any chest pain shortness of breath.  While at urgent care he felt lightheaded and warm and flushed and sweaty and nauseous without vomiting.  Sent to ER for further evaluation.  Also concerned about his elevated blood pressure in the 160s while at urgent care.  Reports symptoms have largely resolved besides sinus congestion and pressure.  Denies any other complaints.  Chart reviewed.    Past Medical History:  No date: GERD (gastroesophageal reflux disease)  No date: History of stomach ulcers  No date: Male infertility, unspecified      Comment:  Varicocele  5/5/2021: Moderate episode of recurrent major depressive disorder   (HCC)  No date: Sleep apnea, obstructive  Family History: non-contributory  Social History              Review of Systems   Constitutional:  Positive for diaphoresis. Negative for appetite change, chills, fever and unexpected weight change.   HENT:  Positive for rhinorrhea, sinus pressure and sinus pain. Negative for congestion.    Eyes:  Negative for photophobia and visual disturbance.   Respiratory:  Negative for cough, chest tightness and shortness of breath.    Cardiovascular:  Negative for chest pain, palpitations and leg swelling.   Gastrointestinal:  Positive for nausea. Negative for abdominal distention, abdominal pain, blood in stool, constipation, diarrhea  and vomiting.   Genitourinary:  Negative for dysuria and hematuria.   Musculoskeletal:  Negative for back pain, joint swelling, neck pain and neck stiffness.   Skin:  Negative for color change, pallor, rash and wound.   Neurological:  Positive for light-headedness. Negative for dizziness, syncope, weakness and headaches.   Psychiatric/Behavioral:  Negative for agitation.    All other systems reviewed and are negative.          Objective     ED Triage Vitals   Temperature Pulse Blood Pressure Respirations SpO2 Patient Position - Orthostatic VS   09/15/24 1434 09/15/24 1434 09/15/24 1434 09/15/24 1434 09/15/24 1433 09/15/24 1434   98.6 °F (37 °C) 92 165/100 18 96 % Lying      Temp Source Heart Rate Source BP Location FiO2 (%) Pain Score    09/15/24 1434 09/15/24 1434 09/15/24 1434 -- --    Oral Monitor Left arm          Physical Exam  Vitals and nursing note reviewed.   Constitutional:       General: He is not in acute distress.     Appearance: Normal appearance. He is well-developed. He is not ill-appearing, toxic-appearing or diaphoretic.   HENT:      Head: Normocephalic and atraumatic.      Nose: Congestion and rhinorrhea present.      Mouth/Throat:      Mouth: Mucous membranes are moist.      Pharynx: Oropharynx is clear. No oropharyngeal exudate or posterior oropharyngeal erythema.   Eyes:      General: No scleral icterus.        Right eye: No discharge.         Left eye: No discharge.      Extraocular Movements: Extraocular movements intact.      Conjunctiva/sclera: Conjunctivae normal.      Pupils: Pupils are equal, round, and reactive to light.   Neck:      Vascular: No JVD.      Trachea: No tracheal deviation.      Comments: Supple. Normal range of motion.   Cardiovascular:      Rate and Rhythm: Normal rate and regular rhythm.      Heart sounds: Normal heart sounds. No murmur heard.     No friction rub. No gallop.      Comments: Normal rate and regular rhythm  Pulmonary:      Effort: Pulmonary effort is  normal. No respiratory distress.      Breath sounds: Normal breath sounds. No stridor. No wheezing or rales.      Comments: Clear to auscultation bilaterally  Chest:      Chest wall: No tenderness.   Abdominal:      General: Bowel sounds are normal. There is no distension.      Palpations: Abdomen is soft.      Tenderness: There is no abdominal tenderness. There is no right CVA tenderness, left CVA tenderness, guarding or rebound.      Comments: Soft, nontender, nondistended.  Normal bowel sounds throughout   Musculoskeletal:         General: No swelling, tenderness, deformity or signs of injury. Normal range of motion.      Cervical back: Normal range of motion and neck supple. No rigidity. No muscular tenderness.      Right lower leg: No edema.      Left lower leg: No edema.   Lymphadenopathy:      Cervical: No cervical adenopathy.   Skin:     General: Skin is warm and dry.      Coloration: Skin is not pale.      Findings: No erythema or rash.   Neurological:      General: No focal deficit present.      Mental Status: He is alert. Mental status is at baseline.      Sensory: No sensory deficit.      Motor: No weakness or abnormal muscle tone.      Coordination: Coordination normal.      Gait: Gait normal.      Comments: Alert.  Strength and sensation grossly intact.  Ambulatory without difficulty at baseline.    Psychiatric:         Behavior: Behavior normal.         Thought Content: Thought content normal.         Labs Reviewed   CBC AND DIFFERENTIAL - Abnormal       Result Value    WBC 8.21      RBC 5.70 (*)     Hemoglobin 15.7      Hematocrit 46.9      MCV 82      MCH 27.5      MCHC 33.5      RDW 13.1      MPV 10.1      Platelets 243      nRBC 0      Segmented % 80 (*)     Immature Grans % 1      Lymphocytes % 8 (*)     Monocytes % 10      Eosinophils Relative 0      Basophils Relative 1      Absolute Neutrophils 6.68      Absolute Immature Grans 0.07      Absolute Lymphocytes 0.63      Absolute Monocytes 0.78       Eosinophils Absolute 0.01      Basophils Absolute 0.04     HS TROPONIN I 0HR - Normal    hs TnI 0hr 3     BASIC METABOLIC PANEL    Sodium 137      Potassium 3.5      Chloride 100      CO2 25      ANION GAP 12      BUN 12      Creatinine 0.84      Glucose 128      Calcium 9.5      eGFR 111      Narrative:     [employer] guidelines for Chronic Kidney Disease (CKD):     Stage 1 with normal or high GFR (GFR > 90 mL/min/1.73 square meters)    Stage 2 Mild CKD (GFR = 60-89 mL/min/1.73 square meters)    Stage 3A Moderate CKD (GFR = 45-59 mL/min/1.73 square meters)    Stage 3B Moderate CKD (GFR = 30-44 mL/min/1.73 square meters)    Stage 4 Severe CKD (GFR = 15-29 mL/min/1.73 square meters)    Stage 5 End Stage CKD (GFR <15 mL/min/1.73 square meters)  Note: GFR calculation is accurate only with a steady state creatinine     No orders to display       Procedures       Stew Loomis MD  09/15/24 9704

## 2024-09-15 NOTE — PROGRESS NOTES
[facility]        NAME: Elie Thomas is a 39 y.o. male  : 1985    MRN: [id number]  DATE: September 15, 2024  TIME: 1:17 PM    Assessment and Plan   Elevated blood pressure reading [R03.0]  1. Elevated blood pressure reading  Transfer to other facility        Exam consistent with acute sinusitis. However patient became diaphoretic in exam room and had tingling in his R arm. Repeat manual BP was 160/110. Denies taking any OTC cough/cold medications today. Only took Excedrin PTA. History of anxiety. Recommend further evaluation in ER. Declined ambulance transport. Wife driving to ER.     Patient Instructions     Proceed to ER for further evaluation.     Chief Complaint     Chief Complaint   Patient presents with    Nausea     Pt c/o nausea 3x within the past week with vomiting and diarrhea, sinus problem since july         History of Present Illness       The patient presents today with complaints of sinus pressure/pain, pain to his upper teeth since July. He has been taking OTC mucinex, and vicks nasal spray with minimal relief. Denies fever/chills. Symptoms started shortly after returning from [place]. Then intermittently over this past week he has been having bouts of nausea/vomiting/diarrhea. Is attributing these symptoms to his sinus symptoms thinking it may be causing an upset stomach.         Review of Systems   Review of Systems   Constitutional:  Negative for chills and fever.   HENT:  Positive for congestion and sinus pressure (pressure in upper teeth). Negative for ear pain, postnasal drip, rhinorrhea and sore throat.    Respiratory:  Negative for cough.    Gastrointestinal:  Positive for diarrhea, nausea and vomiting. Negative for abdominal pain.   Musculoskeletal:  Negative for myalgias.   Skin:  Negative for rash.   Psychiatric/Behavioral:  The patient is nervous/anxious.          Current Medications       Current Outpatient Medications:     esomeprazole (NexIUM) 40 MG capsule, TAKE ONE  CAPSULE BY MOUTH EVERY DAY, Disp: 90 capsule, Rfl: 0    fluticasone (FLONASE) 50 mcg/act nasal spray, USE 1 SPRAY IN EACH NOSTRIL ONCE A DAY, Disp: 16 g, Rfl: 1    fluvoxaMINE (LUVOX) 100 mg tablet, Take 1 tablet (100 mg total) by mouth daily at bedtime, Disp: 90 tablet, Rfl: 3    albuterol (PROVENTIL HFA,VENTOLIN HFA) 90 mcg/act inhaler, Inhale 2 puffs every 6 (six) hours as needed for wheezing (Patient not taking: Reported on 9/15/2024), Disp: 8 g, Rfl: 0    hydrOXYzine pamoate (VISTARIL) 25 mg capsule, Take 1 capsule (25 mg total) by mouth 4 (four) times a day as needed for anxiety (Patient not taking: Reported on 9/15/2024), Disp: 30 capsule, Rfl: 0    meclizine (ANTIVERT) 25 mg tablet, TAKE ONE TABLET BY MOUTH THREE TIMES A DAY AS NEEDED FOR DIZZINESS (Patient not taking: Reported on 9/15/2024), Disp: 30 tablet, Rfl: 0    Current Allergies     Allergies as of 09/15/2024    (No Known Allergies)            The following portions of the patient's history were reviewed and updated as appropriate: allergies, current medications, past family history, past medical history, past social history, past surgical history and problem list.     Past Medical History:   Diagnosis Date    GERD (gastroesophageal reflux disease)     History of stomach ulcers     Male infertility, unspecified     Varicocele    Moderate episode of recurrent major depressive disorder (HCC) 5/5/2021    Sleep apnea, obstructive        Past Surgical History:   Procedure Laterality Date    EYE SURGERY Left     NASAL SEPTUM SURGERY N/A     NASAL SEPTUM SURGERY      IN ESOPHAGOGASTRODUODENOSCOPY TRANSORAL DIAGNOSTIC N/A 3/25/2019    Procedure: ESOPHAGOGASTRODUODENOSCOPY (EGD);  Surgeon: Cesar Zuluaga DO;  Location: [facility];  Service: Gastroenterology       Family History   Problem Relation Age of Onset    Hypothyroidism Mother     Heart disease Father          Medications have been verified.        Objective   BP (!) 160/110   Pulse 101   Resp 18    SpO2 96%        Physical Exam     Physical Exam  Vitals and nursing note reviewed.   Constitutional:       General: He is not in acute distress.     Appearance: Normal appearance. He is not ill-appearing.   HENT:      Head: Normocephalic and atraumatic.      Right Ear: Tympanic membrane, ear canal and external ear normal.      Left Ear: Tympanic membrane, ear canal and external ear normal.      Nose: Congestion present. No rhinorrhea.      Right Sinus: Maxillary sinus tenderness present. No frontal sinus tenderness.      Left Sinus: Maxillary sinus tenderness present. No frontal sinus tenderness.      Mouth/Throat:      Lips: Pink.      Mouth: Mucous membranes are moist.      Pharynx: No oropharyngeal exudate or posterior oropharyngeal erythema.      Tonsils: No tonsillar exudate.   Eyes:      General: Vision grossly intact.      Extraocular Movements: Extraocular movements intact.      Pupils: Pupils are equal, round, and reactive to light.   Cardiovascular:      Rate and Rhythm: Regular rhythm. Tachycardia present.      Heart sounds: Normal heart sounds. No murmur heard.  Pulmonary:      Effort: Pulmonary effort is normal. No respiratory distress.      Breath sounds: Normal breath sounds. No decreased air movement. No decreased breath sounds, wheezing, rhonchi or rales.   Musculoskeletal:         General: Normal range of motion.      Cervical back: Normal range of motion.   Lymphadenopathy:      Cervical: No cervical adenopathy.   Skin:     General: Skin is warm.      Findings: No rash.   Neurological:      Mental Status: He is alert and oriented to person, place, and time.      Motor: Motor function is intact.      Gait: Gait is intact.   Psychiatric:         Attention and Perception: Attention normal.         Mood and Affect: Mood is anxious.

## 2024-09-15 NOTE — Clinical Note
Elie Thomas was seen and treated in our emergency department on 9/15/2024.                Diagnosis: sinus infection    Elie  may return to work on return date.    He may return on this date: 09/17/2024         If you have any questions or concerns, please don't hesitate to call.      Stew Loomis MD    ______________________________           _______________          _______________  Hospital Representative                              Date                                Time
- - -

## 2024-09-16 ENCOUNTER — TELEPHONE (OUTPATIENT)
Age: 39
End: 2024-09-16

## 2024-09-16 NOTE — TELEPHONE ENCOUNTER
SPOKE W/PT, SCHEDULED.      NEXT APPT  With Family Medicine (Elie Zuleta DO)  09/18/2024 at 4:00 PM  Care Gaps: 3   1

## 2024-09-16 NOTE — TELEPHONE ENCOUNTER
Patient was not feeling well yesterday and went to urgent care.  At urgent care he had high blood pressure. He then felt his arms and fingers go  numb and was sent by ambulance to ER.  His dx was high blood pressure.  He is to see his PCP in 1 week.  He prefer to see Dr. Zuleta.      Please advise and notify.    Thank you.

## 2024-09-18 ENCOUNTER — OFFICE VISIT (OUTPATIENT)
Dept: FAMILY MEDICINE CLINIC | Facility: CLINIC | Age: 39
End: 2024-09-18
Payer: COMMERCIAL

## 2024-09-18 VITALS
DIASTOLIC BLOOD PRESSURE: 104 MMHG | HEART RATE: 96 BPM | BODY MASS INDEX: 32.08 KG/M2 | HEIGHT: 74 IN | OXYGEN SATURATION: 99 % | RESPIRATION RATE: 18 BRPM | WEIGHT: 250 LBS | SYSTOLIC BLOOD PRESSURE: 154 MMHG

## 2024-09-18 DIAGNOSIS — I10 PRIMARY HYPERTENSION: Primary | ICD-10-CM

## 2024-09-18 PROCEDURE — 99213 OFFICE O/P EST LOW 20 MIN: CPT | Performed by: FAMILY MEDICINE

## 2024-09-18 RX ORDER — AMLODIPINE BESYLATE 5 MG/1
5 TABLET ORAL DAILY
Qty: 30 TABLET | Refills: 1 | Status: SHIPPED | OUTPATIENT
Start: 2024-09-18

## 2024-09-18 NOTE — PROGRESS NOTES
"Ambulatory Visit  Name: Elie Thomas      : 1985      MRN: [id number]  Encounter Provider: Elie Zuleta DO  Encounter Date: 2024   Encounter department: [facility] [address]      Assessment & Plan  Primary hypertension  Start amlodipine 5 mg daily, recheck his blood pressure in the office for his regular appointment in October.  He is to check his blood pressures at home twice a week.  Orders:    amLODIPine (NORVASC) 5 mg tablet; Take 1 tablet (5 mg total) by mouth daily         History of Present Illness     Patient comes in today for ER follow-up.  He was seen emergency room initially for a sinus infection however he was noted to have blood pressures in the 160/100 range.  He states he has been very anxious because of this.      Review of Systems   Constitutional:  Negative for chills, fatigue and fever.   HENT:  Negative for congestion, ear pain, hearing loss, postnasal drip, rhinorrhea and sore throat.    Eyes:  Negative for pain and visual disturbance.   Respiratory:  Negative for chest tightness, shortness of breath and wheezing.    Cardiovascular:  Negative for chest pain and leg swelling.   Gastrointestinal:  Negative for abdominal distention, abdominal pain, constipation, diarrhea and vomiting.   Endocrine: Negative for cold intolerance and heat intolerance.   Genitourinary:  Negative for difficulty urinating, frequency and urgency.   Musculoskeletal:  Negative for arthralgias and gait problem.   Skin:  Negative for color change.   Neurological:  Negative for dizziness, tremors, syncope, numbness and headaches.   Hematological:  Negative for adenopathy.   Psychiatric/Behavioral:  Negative for agitation, confusion and sleep disturbance. The patient is nervous/anxious.      Objective     BP (!) 154/104 (BP Location: Left arm, Patient Position: Sitting, Cuff Size: Large)   Pulse 96   Resp 18   Ht 6' 2\" (1.88 m)   Wt 113 kg (250 lb)   SpO2 99%   BMI " 32.10 kg/m²     Physical Exam  Constitutional:       Appearance: He is well-developed.   HENT:      Head: Normocephalic.      Right Ear: External ear normal.      Left Ear: External ear normal.      Nose: Nose normal.   Eyes:      Extraocular Movements: Extraocular movements intact.      Conjunctiva/sclera: Conjunctivae normal.      Pupils: Pupils are equal, round, and reactive to light.   Neck:      Thyroid: No thyromegaly.   Cardiovascular:      Rate and Rhythm: Normal rate and regular rhythm.      Heart sounds: Normal heart sounds.   Pulmonary:      Effort: Pulmonary effort is normal.      Breath sounds: Normal breath sounds.   Abdominal:      General: Bowel sounds are normal.      Palpations: Abdomen is soft.   Musculoskeletal:         General: Normal range of motion.      Cervical back: Normal range of motion.   Skin:     General: Skin is warm and dry.   Neurological:      Mental Status: He is alert and oriented to person, place, and time.   Psychiatric:         Mood and Affect: Mood normal.         Behavior: Behavior normal.         Elie Zuleta DO

## 2024-09-18 NOTE — ASSESSMENT & PLAN NOTE
Start amlodipine 5 mg daily, recheck his blood pressure in the office for his regular appointment in October.  He is to check his blood pressures at home twice a week.  Orders:    amLODIPine (NORVASC) 5 mg tablet; Take 1 tablet (5 mg total) by mouth daily

## 2024-09-30 ENCOUNTER — OFFICE VISIT (OUTPATIENT)
Dept: OBGYN CLINIC | Facility: CLINIC | Age: 39
End: 2024-09-30
Payer: COMMERCIAL

## 2024-09-30 VITALS
HEIGHT: 74 IN | WEIGHT: 250 LBS | HEART RATE: 99 BPM | DIASTOLIC BLOOD PRESSURE: 95 MMHG | SYSTOLIC BLOOD PRESSURE: 145 MMHG | BODY MASS INDEX: 32.08 KG/M2

## 2024-09-30 DIAGNOSIS — S69.91XA INJURY OF RIGHT MIDDLE FINGER, INITIAL ENCOUNTER: ICD-10-CM

## 2024-09-30 DIAGNOSIS — M79.641 RIGHT HAND PAIN: Primary | ICD-10-CM

## 2024-09-30 DIAGNOSIS — K29.00 OTHER ACUTE GASTRITIS WITHOUT HEMORRHAGE: ICD-10-CM

## 2024-09-30 PROCEDURE — 99213 OFFICE O/P EST LOW 20 MIN: CPT | Performed by: ORTHOPAEDIC SURGERY

## 2024-09-30 RX ORDER — ESOMEPRAZOLE MAGNESIUM 40 MG/1
CAPSULE, DELAYED RELEASE ORAL
Qty: 90 CAPSULE | Refills: 0 | Status: SHIPPED | OUTPATIENT
Start: 2024-09-30

## 2024-09-30 NOTE — PROGRESS NOTES
Assessment/Plan:   Diagnoses and all orders for this visit:    Right hand pain  -     Cancel: XR hand 3+ vw left; Future  -     Ambulatory Referral to PT/OT Hand Therapy; Future    Injury of right middle finger, initial encounter       Reviewed with patient at time of visit that physical exam and imaging demonstrate tendonitis or possible partial tear of tendon of Right middle finger. Discussed various treatment options including physical therapy. An order was placed for him to attended formal PT/OT. He will follow up in 6 weeks. If there is no improvement, an MRI may be ordered    It appears the patient sustained a strain of his right middle finger bowling.  On physical examination tendon function is intact.  No laxity.  No weakness.  Would recommend therapy for range of motion, strengthening and stretching.  Return back in 6 weeks evaluation.  Any further issues at that point, an MRI may be warranted      Subjective:   Patient ID: Elie Thomas  1985     HPI  Patient is a 39 y.o. male who presents for initial evaluation of right middle finger. He states he was bowling the other day and felt something pop. He has pain with extension. He denies numbness and tingling. He denies locking at catching.     The following portions of the patient's history were reviewed and updated as appropriate:  Past medical history, past surgical history, Family history, social history, current medications and allergies    Past Medical History:   Diagnosis Date    GERD (gastroesophageal reflux disease)     History of stomach ulcers     Male infertility, unspecified     Varicocele    Moderate episode of recurrent major depressive disorder (HCC) 5/5/2021    Sleep apnea, obstructive        Past Surgical History:   Procedure Laterality Date    EYE SURGERY Left     NASAL SEPTUM SURGERY N/A     NASAL SEPTUM SURGERY      FL ESOPHAGOGASTRODUODENOSCOPY TRANSORAL DIAGNOSTIC N/A 3/25/2019    Procedure: ESOPHAGOGASTRODUODENOSCOPY (EGD);   Surgeon: Cesar Zuluaga DO;  Location: [facility];  Service: Gastroenterology       Family History   Problem Relation Age of Onset    Hypothyroidism Mother     Heart disease Father        Social History     Socioeconomic History    Marital status: /Civil Union     Spouse name: None    Number of children: None    Years of education: None    Highest education level: None   Occupational History    None   Tobacco Use    Smoking status: Never    Smokeless tobacco: Never   Vaping Use    Vaping status: Never Used   Substance and Sexual Activity    Alcohol use: Yes     Comment: social-holidyas    Drug use: Never    Sexual activity: None   Other Topics Concern    None   Social History Narrative    None     Social Determinants of Health     Financial Resource Strain: Not on file   Food Insecurity: Not on file   Transportation Needs: Not on file   Physical Activity: Not on file   Stress: Not on file   Social Connections: Not on file   Intimate Partner Violence: Not on file   Housing Stability: Not on file         Current Outpatient Medications:     amLODIPine (NORVASC) 5 mg tablet, Take 1 tablet (5 mg total) by mouth daily, Disp: 30 tablet, Rfl: 1    esomeprazole (NexIUM) 40 MG capsule, TAKE ONE CAPSULE BY MOUTH EVERY DAY, Disp: 90 capsule, Rfl: 0    fluticasone (FLONASE) 50 mcg/act nasal spray, USE 1 SPRAY IN EACH NOSTRIL ONCE A DAY, Disp: 16 g, Rfl: 1    fluvoxaMINE (LUVOX) 100 mg tablet, Take 1 tablet (100 mg total) by mouth daily at bedtime, Disp: 90 tablet, Rfl: 3    meclizine (ANTIVERT) 25 mg tablet, TAKE ONE TABLET BY MOUTH THREE TIMES A DAY AS NEEDED FOR DIZZINESS, Disp: 30 tablet, Rfl: 0    ondansetron (ZOFRAN-ODT) 4 mg disintegrating tablet, Take 1 tablet (4 mg total) by mouth every 6 (six) hours as needed for nausea or vomiting, Disp: 12 tablet, Rfl: 0    albuterol (PROVENTIL HFA,VENTOLIN HFA) 90 mcg/act inhaler, Inhale 2 puffs every 6 (six) hours as needed for wheezing (Patient not taking: Reported on  "9/15/2024), Disp: 8 g, Rfl: 0    No Known Allergies    Review of Systems   Constitutional:  Negative for chills and fever.   HENT:  Negative for ear pain and sore throat.    Eyes:  Negative for pain and visual disturbance.   Respiratory:  Negative for cough and shortness of breath.    Cardiovascular:  Negative for chest pain and palpitations.   Gastrointestinal:  Negative for abdominal pain and vomiting.   Genitourinary:  Negative for dysuria and hematuria.   Musculoskeletal:  Negative for arthralgias and back pain.   Skin:  Negative for color change and rash.   Neurological:  Negative for seizures and syncope.   All other systems reviewed and are negative.       Objective:  /95   Pulse 99   Ht 6' 2\" (1.88 m)   Wt 113 kg (250 lb)   BMI 32.10 kg/m²     Ortho Exam  right Hand -  Middle Finger  Patient presents with no obvious anatomical deformity  Skin is warm and dry to touch with no signs of erythema, ecchymosis, or infection  No soft tissue swelling or effusion noted  Full FDS, FDP, extensor mechanisms are intact  No rotational deformity with composite finger flexion  TTP with middle finger flexor tendon  - Reproducible triggering on exam  Demonstrates normal wrist, elbow, and shoulder motion  Forearm compartments are soft and supple  2+ distal radial pulse with brisk capillary refill to the fingers  Radial, median, and ulnar motor and sensory distribution intact  Sensations light to touch intact distally      Physical Exam  Vitals and nursing note reviewed.   Constitutional:       General: He is not in acute distress.     Appearance: He is well-developed.   HENT:      Head: Normocephalic and atraumatic.   Eyes:      Conjunctiva/sclera: Conjunctivae normal.   Cardiovascular:      Rate and Rhythm: Normal rate and regular rhythm.      Heart sounds: No murmur heard.  Pulmonary:      Effort: Pulmonary effort is normal. No respiratory distress.      Breath sounds: Normal breath sounds.   Abdominal:      " Palpations: Abdomen is soft.      Tenderness: There is no abdominal tenderness.   Musculoskeletal:         General: No swelling.      Cervical back: Neck supple.   Skin:     General: Skin is warm and dry.      Capillary Refill: Capillary refill takes less than 2 seconds.   Neurological:      Mental Status: He is alert.   Psychiatric:         Mood and Affect: Mood normal.        Diagnostic Test Review:  X-Ray of right hand obtained on 9/30/2024 were reviewed and demonstrate no acute osseous abnormalities.      Procedures   None performed at today's visit    Scribe Attestation      I,:  Sandrine Marcial am acting as a scribe while in the presence of the attending physician.:       I,:  Logan Stewart, DO personally performed the services described in this documentation    as scribed in my presence.:

## 2024-10-08 ENCOUNTER — EVALUATION (OUTPATIENT)
Dept: OCCUPATIONAL THERAPY | Facility: CLINIC | Age: 39
End: 2024-10-08
Payer: COMMERCIAL

## 2024-10-08 DIAGNOSIS — M79.641 RIGHT HAND PAIN: ICD-10-CM

## 2024-10-08 PROCEDURE — 97110 THERAPEUTIC EXERCISES: CPT

## 2024-10-08 PROCEDURE — 97140 MANUAL THERAPY 1/> REGIONS: CPT

## 2024-10-08 PROCEDURE — 97165 OT EVAL LOW COMPLEX 30 MIN: CPT

## 2024-10-08 NOTE — PROGRESS NOTES
OT Evaluation     Today's date: 10/8/2024  Patient name: Elie Thomas  : 1985  MRN: [id number]  Referring provider: Logan Stewart DO  Dx:   Encounter Diagnosis     ICD-10-CM    1. Right hand pain  M79.641 Ambulatory Referral to PT/OT Hand Therapy          Start Time: 1722  Stop Time: 1815  Total time in clinic (min): 53 minutes    Assessment  Impairments: activity intolerance, impaired physical strength, lacks appropriate home exercise program, pain with function and activity limitations  Other impairment: Edema  Functional limitations: Difficulty extending digit due to pain  Symptom irritability: moderate    Assessment details: Elie Thomas is a 39 y.o., Right HD male referred to hand therapy for R hand pain.  Onset of injury 25 due to an injury while bowling. Patient experienced a popping sensation in his hand and has had pain ever since. Patient presents 10/08/24 with impaired strength and edema of the the R hand/wrist.  Deficits also noted in pain with functional use of the right hand. Patient has mild edema at P1 of his RMF. He has pain and tenderness at his A1 pulley of his RMF and the ulnar aspect of P1 of his RMF. He experiences stiffness with digit ROM but ROM is WNLs. Patient is a good candidate for OT services to decrease pain and edema and restore pain-free ROM, strength, coordination for a return to independence in daily tasks.   Understanding of Dx/Px/POC: excellent     Prognosis: good    Goals  STGs (4 weeks)  Patient will be independent in implementing HEP prescribed by therapist  Patient will report an average pain level of 4/10 at worst when extending digits  Patient will demonstrate 15# improvement in R hand  strength for improved functional use of R hand   Decrease edema by 50% at P1 of RMF as evident by circumferential measurements.  LTGs (8 weeks)  Patient will demonstrate independence in a HEP to maintain ROM, strength, and function at discharge  Patient will report  "an average pain level of 1-2/10 to be independent in daily tasks  Patient will demonstrate R  strength equal to or greater than L hand for improved use dominant hand in work-related activities  Patient will achieve goals as demonstrated by FOTO results  Patient will demonstrate resolution of edema     Plan  Patient would benefit from: skilled occupational therapy and home program  Planned modality interventions: thermotherapy: hydrocollator packs, ultrasound, cryotherapy and thermotherapy: paraffin bath    Planned therapy interventions: IASTM, kinesiology taping, manual therapy, massage, orthotic fitting/training, orthotic management and training, patient/caregiver education, strengthening, stretching, therapeutic activities, therapeutic exercise, home exercise program, graded exercise, graded activity, functional ROM exercises, flexibility and fine motor coordination training    Frequency: 1x week  Duration in weeks: 8  Plan of Care beginning date: 10/8/2024  Plan of Care expiration date: 12/3/2024  Treatment plan discussed with: patient        Subjective Evaluation    History of Present Illness  Date of onset: 2024  Mechanism of injury: Elie Thomas is a 39 y.o. male referred to OT for R hand. Patient was bowling and felt a pop in his middle finger and has had pain since, especially with extension. Xrays unremarkable. He is also having pain with lifting bags when the bag weighs down on his fingers. He reports he had one episode of triggering in his RMF this past weekend.   Patient Goals  Patient goals for therapy: decreased pain and increased motion  Patient goal: \"Be able to close my hand and push it back\"  Pain  Current pain ratin  At best pain ratin  At worst pain ratin  Location: Volar palm proximal to RMF, ulnar aspect of P1 of RMF  Quality: sharp  Relieving factors: ice  Exacerbated by: extending RMF.  Symptom course: Symptoms were improving, but have plateaud.    Social " Support    Employment status: working ([employer] work)  Hand dominance: right      Diagnostic Tests  X-ray: normal  Treatments  Current treatment: occupational therapy        Objective     Observations     Right Wrist/Hand   Positive for edema. Negative for trigger finger.     Additional Observation Details  Mild edema P1 RMF    Palpation     Additional Palpation Details  Tenderness over FDS insertion    Tenderness     Right Wrist/Hand   Tenderness in the MCP joint and PIP joint.     Additional Tenderness Details  Tenderness proximal to RIF A1 pulley    Neurological Testing     Additional Neurological Details  Patient denies any N/T in digits    Active Range of Motion     Right Wrist   Wrist flexion: WFL  Wrist extension: WFl  Radial deviation: WFL  Ulnar deviation: WFL    Right Digits   Flexion   Middle     MCP: 85    PIP: 90    DIP: 80  Middle: 255 degrees    Strength/Myotome Testing     Left Wrist/Hand      (2nd hand position)     Trial 1: 100    Thumb Strength  Key/Lateral Pinch     Trial 1: 21  Palmar/Three-Point Pinch     Trial 1: 21    Right Wrist/Hand      (2nd hand position)     Trial 1: 70    Thumb Strength   Key/Lateral Pinch     Trial 1: 24  Palmar/Three-Point Pinch     Trial 1: 17    Tests     Right Wrist/Hand   Negative RCL varus stress and UCL valgus stress.     Swelling     Left Wrist/Hand   Middle     Proximal: 7.6 cm    Middle: 6.2 cm    Distal: 5.6 cm  Circumference MCP: 21.5 cm    Right Wrist/Hand   Middle     Proximal: 7.6 cm    Middle: 6.2 cm    Distal: 5.6 cm  Circumference MCP: 21.5 cm             Precautions: Universal  Access Code: [email or web address]    POC expires Unit limit Auth  expiration date PT/OT + Visit Limit?   12/3/24  12/31/24 36 all therapies pcy                 Visit/Unit Tracking  AUTH Status:  Date 10/8 IE              36 all therapy pcy Used 1               Remaining                     Manuals 10/8 IE        IASTM RMF A1 pulley/palm 9'                                 "   Neuro Re-Ed                                                                        Ther Ex         TGEs x20        Digit AROM Towel scrunch x10        Wrist flexor stretch 10 x 5\"        Tabletop ext x10 all digits        Intrinsic stretch         Blocking ROM         FB rolls for digit extension stretch/massage                           Ther Activity                           HEP POC                          Modalities         P 5'        US 50% pulse, 3/3 mhz, 0.8 w/cm2              "

## 2024-10-14 ENCOUNTER — OFFICE VISIT (OUTPATIENT)
Dept: FAMILY MEDICINE CLINIC | Facility: CLINIC | Age: 39
End: 2024-10-14
Payer: COMMERCIAL

## 2024-10-14 VITALS
SYSTOLIC BLOOD PRESSURE: 120 MMHG | HEART RATE: 98 BPM | WEIGHT: 251.6 LBS | OXYGEN SATURATION: 99 % | HEIGHT: 74 IN | BODY MASS INDEX: 32.29 KG/M2 | RESPIRATION RATE: 16 BRPM | DIASTOLIC BLOOD PRESSURE: 90 MMHG

## 2024-10-14 DIAGNOSIS — F41.9 ANXIETY: ICD-10-CM

## 2024-10-14 DIAGNOSIS — Z13.6 SCREENING FOR CARDIOVASCULAR CONDITION: ICD-10-CM

## 2024-10-14 DIAGNOSIS — I10 PRIMARY HYPERTENSION: ICD-10-CM

## 2024-10-14 DIAGNOSIS — Z00.00 ANNUAL PHYSICAL EXAM: Primary | ICD-10-CM

## 2024-10-14 PROCEDURE — 99213 OFFICE O/P EST LOW 20 MIN: CPT | Performed by: FAMILY MEDICINE

## 2024-10-14 PROCEDURE — 99395 PREV VISIT EST AGE 18-39: CPT | Performed by: FAMILY MEDICINE

## 2024-10-14 RX ORDER — AMLODIPINE BESYLATE 10 MG/1
10 TABLET ORAL DAILY
Qty: 90 TABLET | Refills: 1 | Status: SHIPPED | OUTPATIENT
Start: 2024-10-14

## 2024-10-14 NOTE — ASSESSMENT & PLAN NOTE
Increase amlodipine to 10 mg daily, recheck his blood pressure in the office in 6 months.  Orders:    amLODIPine (NORVASC) 10 mg tablet; Take 1 tablet (10 mg total) by mouth daily

## 2024-10-14 NOTE — PROGRESS NOTES
Adult Annual Physical  Name: Elie Thomas      : 1985      MRN: [id number]  Encounter Provider: Elie Zuleta DO  Encounter Date: 10/14/2024   Encounter department: [facility] [address]    Assessment & Plan  Annual physical exam         Primary hypertension  Increase amlodipine to 10 mg daily, recheck his blood pressure in the office in 6 months.  Orders:    amLODIPine (NORVASC) 10 mg tablet; Take 1 tablet (10 mg total) by mouth daily    Anxiety  Doing well, continue the fluvoxamine       Immunizations and preventive care screenings were discussed with patient today. Appropriate education was printed on patient's after visit summary.    Counseling:  Dental Health: discussed importance of regular tooth brushing, flossing, and dental visits.  Exercise: the importance of regular exercise/physical activity was discussed. Recommend exercise 3-5 times per week for at least 30 minutes.          History of Present Illness     Adult Annual Physical:  Patient presents for annual physical. Patient comes in today for checkup, he has been monitoring his blood pressure at home and has been getting readings from 140-160/.  He states the fluvoxamine is working well for his anxiety, he does not wish to make any changes with this..     Diet and Physical Activity:  - Diet/Nutrition: well balanced diet.  - Exercise: walking.    General Health:  - Sleep: sleeps well.  - Hearing: normal hearing bilateral ears.  - Vision: no vision problems.  - Dental: regular dental visits.    Review of Systems   Constitutional:  Negative for chills, fatigue and fever.   HENT:  Negative for congestion, ear pain, hearing loss, postnasal drip, rhinorrhea and sore throat.    Eyes:  Negative for pain and visual disturbance.   Respiratory:  Negative for chest tightness, shortness of breath and wheezing.    Cardiovascular:  Negative for chest pain and leg swelling.   Gastrointestinal:  Negative for  "abdominal distention, abdominal pain, constipation, diarrhea and vomiting.   Endocrine: Negative for cold intolerance and heat intolerance.   Genitourinary:  Negative for difficulty urinating, frequency and urgency.   Musculoskeletal:  Negative for arthralgias and gait problem.   Skin:  Negative for color change.   Neurological:  Negative for dizziness, tremors, syncope, numbness and headaches.   Hematological:  Negative for adenopathy.   Psychiatric/Behavioral:  Negative for agitation, confusion and sleep disturbance. The patient is not nervous/anxious.          Objective     /90   Pulse 98   Resp 16   Ht 6' 2\" (1.88 m)   Wt 114 kg (251 lb 9.6 oz)   SpO2 99%   BMI 32.30 kg/m²     Physical Exam  Constitutional:       Appearance: He is well-developed.   HENT:      Head: Normocephalic.      Right Ear: External ear normal.      Left Ear: External ear normal.      Nose: Nose normal.   Eyes:      Extraocular Movements: Extraocular movements intact.      Conjunctiva/sclera: Conjunctivae normal.      Pupils: Pupils are equal, round, and reactive to light.   Neck:      Thyroid: No thyromegaly.   Cardiovascular:      Rate and Rhythm: Normal rate and regular rhythm.      Heart sounds: Normal heart sounds.   Pulmonary:      Effort: Pulmonary effort is normal.      Breath sounds: Normal breath sounds.   Abdominal:      General: Bowel sounds are normal.      Palpations: Abdomen is soft.   Musculoskeletal:         General: Normal range of motion.      Cervical back: Normal range of motion.   Skin:     General: Skin is warm and dry.   Neurological:      Mental Status: He is alert and oriented to person, place, and time.   Psychiatric:         Mood and Affect: Mood normal.         Behavior: Behavior normal.         "

## 2024-10-14 NOTE — PATIENT INSTRUCTIONS
"Patient Education     Routine physical for adults   The Basics   Written by the doctors and editors at [facility]   What is a physical? -- A physical is a routine visit, or \"check-up,\" with your doctor. You might also hear it called a \"wellness visit\" or \"preventive visit.\"  During each visit, the doctor will:   Ask about your physical and mental health   Ask about your habits, behaviors, and lifestyle   Do an exam   Give you vaccines if needed   Talk to you about any medicines you take   Give advice about your health   Answer your questions  Getting regular check-ups is an important part of taking care of your health. It can help your doctor find and treat any problems you have. But it's also important for preventing health problems.  A routine physical is different from a \"sick visit.\" A sick visit is when you see a doctor because of a health concern or problem. Since physicals are scheduled ahead of time, you can think about what you want to ask the doctor.  How often should I get a physical? -- It depends on your age and health. In general, for people age 21 years and older:   If you are younger than 50 years, you might be able to get a physical every 3 years.   If you are 50 years or older, your doctor might recommend a physical every year.  If you have an ongoing health condition, like diabetes or high blood pressure, your doctor will probably want to see you more often.  What happens during a physical? -- In general, each visit will include:   Physical exam - The doctor or nurse will check your height, weight, heart rate, and blood pressure. They will also look at your eyes and ears. They will ask about how you are feeling and whether you have any symptoms that bother you.   Medicines - It's a good idea to bring a list of all the medicines you take to each doctor visit. Your doctor will talk to you about your medicines and answer any questions. Tell them if you are having any side effects that bother you. You " "should also tell them if you are having trouble paying for any of your medicines.   Habits and behaviors - This includes:   Your diet   Your exercise habits   Whether you smoke, drink alcohol, or use drugs   Whether you are sexually active   Whether you feel safe at home  Your doctor will talk to you about things you can do to improve your health and lower your risk of health problems. They will also offer help and support. For example, if you want to quit smoking, they can give you advice and might prescribe medicines. If you want to improve your diet or get more physical activity, they can help you with this, too.   Lab tests, if needed - The tests you get will depend on your age and situation. For example, your doctor might want to check your:   Cholesterol   Blood sugar   Iron level   Vaccines - The recommended vaccines will depend on your age, health, and what vaccines you already had. Vaccines are very important because they can prevent certain serious or deadly infections.   Discussion of screening - \"Screening\" means checking for diseases or other health problems before they cause symptoms. Your doctor can recommend screening based on your age, risk, and preferences. This might include tests to check for:   Cancer, such as breast, prostate, cervical, ovarian, colorectal, prostate, lung, or skin cancer   Sexually transmitted infections, such as chlamydia and gonorrhea   Mental health conditions like depression and anxiety  Your doctor will talk to you about the different types of screening tests. They can help you decide which screenings to have. They can also explain what the results might mean.   Answering questions - The physical is a good time to ask the doctor or nurse questions about your health. If needed, they can refer you to other doctors or specialists, too.  Adults older than 65 years often need other care, too. As you get older, your doctor will talk to you about:   How to prevent falling at " home   Hearing or vision tests   Memory testing   How to take your medicines safely   Making sure that you have the help and support you need at home  All topics are updated as new evidence becomes available and our peer review process is complete.  This topic retrieved from [employer] on: May 02, 2024.  Topic 137985 Version 1.0  Release: 32.4.3 - C32.122  © 2024 UpToDate, Inc. and/or its affiliates. All rights reserved.  Consumer Information Use and Disclaimer   Disclaimer: This generalized information is a limited summary of diagnosis, treatment, and/or medication information. It is not meant to be comprehensive and should be used as a tool to help the user understand and/or assess potential diagnostic and treatment options. It does NOT include all information about conditions, treatments, medications, side effects, or risks that may apply to a specific patient. It is not intended to be medical advice or a substitute for the medical advice, diagnosis, or treatment of a health care provider based on the health care provider's examination and assessment of a patient's specific and unique circumstances. Patients must speak with a health care provider for complete information about their health, medical questions, and treatment options, including any risks or benefits regarding use of medications. This information does not endorse any treatments or medications as safe, effective, or approved for treating a specific patient. UpToDate, Inc. and its affiliates disclaim any warranty or liability relating to this information or the use thereof.The use of this information is governed by the Terms of Use, available at https://www.[employer].com/en/know/clinical-effectiveness-terms. 2024© UpToDate, Inc. and its affiliates and/or licensors. All rights reserved.  Copyright   © 2024 UpToDate, Inc. and/or its affiliates. All rights reserved.

## 2024-10-15 ENCOUNTER — OFFICE VISIT (OUTPATIENT)
Dept: OCCUPATIONAL THERAPY | Facility: CLINIC | Age: 39
End: 2024-10-15
Payer: COMMERCIAL

## 2024-10-15 DIAGNOSIS — M79.641 RIGHT HAND PAIN: Primary | ICD-10-CM

## 2024-10-15 PROCEDURE — 97110 THERAPEUTIC EXERCISES: CPT

## 2024-10-15 PROCEDURE — 97140 MANUAL THERAPY 1/> REGIONS: CPT

## 2024-10-15 PROCEDURE — 97035 APP MDLTY 1+ULTRASOUND EA 15: CPT

## 2024-10-15 NOTE — PROGRESS NOTES
"Daily Note     Today's date: 10/15/2024  Patient name: Elie Thomas  : 1985  MRN: [id number]  Referring provider: Logan Stewart DO  Dx:   Encounter Diagnosis     ICD-10-CM    1. Right hand pain  M79.641                      Subjective: I'm definitely sore this week. I feel it when I stretch the finger straight      Objective: See treatment diary below      Assessment: Tolerated treatment well. Patient exhibited good technique with therapeutic exercises. Mild edema still noted at A1 pulley RMF, but no triggering noted      Plan: Continue per plan of care.      Precautions: Universal  Access Code: [email or web address]    POC expires Unit limit Auth  expiration date PT/OT + Visit Limit?   12/3/24  12/31/24 36 all therapies pcy                 Visit/Unit Tracking  AUTH Status:  Date 10/8 IE 10/15             36 all therapy pcy Used 1 2              Remaining  35 34                  Manuals 10/8 IE 10/15/24       IASTM RMF A1 pulley/palm 9' 10'                                  Neuro Re-Ed                                                                        Ther Ex  20'       TGEs x20 x20       Digit AROM Towel scrunch x10        Wrist flexor stretch 10 x 5\" 5\" x 20       Tabletop ext x10 all digits 5\" x 10       Intrinsic stretch  x10       Blocking ROM         FB rolls for digit extension stretch/massage  Knobby ball x 20                         Ther Activity         Digit abd/add  Marbles 1 set       [place] hops for digit ext, abd, add  Lg marble x 20       HEP POC                          Modalities         MHP 5' 5' pre tx       US 50% pulse, 3/3 mhz, 0.8 w/cm2  8'            "

## 2024-10-18 DIAGNOSIS — F41.9 ANXIETY: ICD-10-CM

## 2024-10-20 RX ORDER — FLUVOXAMINE MALEATE 100 MG
100 TABLET ORAL
Qty: 90 TABLET | Refills: 1 | Status: SHIPPED | OUTPATIENT
Start: 2024-10-20

## 2024-10-22 ENCOUNTER — OFFICE VISIT (OUTPATIENT)
Dept: OCCUPATIONAL THERAPY | Facility: CLINIC | Age: 39
End: 2024-10-22
Payer: COMMERCIAL

## 2024-10-22 DIAGNOSIS — M79.641 RIGHT HAND PAIN: Primary | ICD-10-CM

## 2024-10-22 PROCEDURE — 97140 MANUAL THERAPY 1/> REGIONS: CPT

## 2024-10-22 PROCEDURE — 97110 THERAPEUTIC EXERCISES: CPT

## 2024-10-22 PROCEDURE — 97035 APP MDLTY 1+ULTRASOUND EA 15: CPT

## 2024-10-22 NOTE — PROGRESS NOTES
"Daily Note     Today's date: 10/22/2024  Patient name: Elie Thomas  : 1985  MRN: [id number]  Referring provider: Logan Stewart DO  Dx:   Encounter Diagnosis     ICD-10-CM    1. Right hand pain  M79.641           Start Time: 1700  Stop Time: 1745  Total time in clinic (min): 45 minutes    Subjective: \"Last Tuesday, it hurt to push on that area. It's not so bad today.\"      Objective: See treatment diary below      Assessment: Tolerated treatment well. Patient exhibited good technique with therapeutic exercises and would benefit from continued OT. A1 pulley of RMF less tender to palpation following tx. Continues to have pain when stretching digit into extension.      Plan: Continue per plan of care.      Precautions: Universal  Access Code: [email or web address]    POC expires Unit limit Auth  expiration date PT/OT + Visit Limit?   12/3/24  12/31/24 36 all therapies pcy                 Visit/Unit Tracking  AUTH Status:  Date 10/8 IE 10/15 10/22            36 all therapy pcy Used 1 2 3             Remaining  35 34 33                 Manuals 10/8 IE 10/15/24 10/22      IASTM RMF A1 pulley/palm 9' 10' 12'                                 Neuro Re-Ed                                                                        Ther Ex  20'       TGEs x20 x20 Hook to fist x20 with pencil      Digit AROM Towel scrunch x10        Wrist flexor stretch 10 x 5\" 5\" x 20 10 x 5\"      Tabletop ext x10 all digits 5\" x 10 10 x 5\"      Intrinsic stretch  x10       Blocking ROM         FB rolls for digit extension stretch/massage  Knobby ball x 20 BFB 5'                        Ther Activity         Digit abd/add  Marbles 1 set Marbles 1 set      [place] hops for digit ext, abd, add  Lg marble x 20 Lg marble x20      HEP POC                          Modalities         MHP 5' 5' pre tx 5'      US 50% pulse, 3/3 mhz, 0.8 w/cm2  8' 8'             "

## 2024-10-29 ENCOUNTER — OFFICE VISIT (OUTPATIENT)
Dept: OCCUPATIONAL THERAPY | Facility: CLINIC | Age: 39
End: 2024-10-29
Payer: COMMERCIAL

## 2024-10-29 DIAGNOSIS — M79.641 RIGHT HAND PAIN: Primary | ICD-10-CM

## 2024-10-29 PROCEDURE — 97110 THERAPEUTIC EXERCISES: CPT

## 2024-10-29 PROCEDURE — 97140 MANUAL THERAPY 1/> REGIONS: CPT

## 2024-10-29 PROCEDURE — 97530 THERAPEUTIC ACTIVITIES: CPT

## 2024-10-29 NOTE — PROGRESS NOTES
"Daily Note     Today's date: 10/29/2024  Patient name: Elie Thomas  : 1985  MRN: [id number]  Referring provider: Logan Stewart DO  Dx:   Encounter Diagnosis     ICD-10-CM    1. Right hand pain  M79.641           Start Time: 1700  Stop Time: 1745  Total time in clinic (min): 45 minutes    Subjective: \"I'm able to stretch it back without as much pain. I have more mobility.\"      Objective: See treatment diary below      Assessment: Tolerated treatment well. Patient exhibited good technique with therapeutic exercises and would benefit from continued OT. Initiated isometric finger flexion and passive digit extension stretch today. Patient tolerated both exercises well and had no pain at end of session. He is able to tolerate greater passive extension of digit without pain.      Plan: Continue per plan of care. RE NV.     Precautions: Universal  Access Code: [email or web address]    POC expires Unit limit Auth  expiration date PT/OT + Visit Limit?   12/3/24  12/31/24 36 all therapies pcy                 Visit/Unit Tracking  AUTH Status:  Date 10/8 IE 10/15 10/22 10/29           36 all therapy pcy Used 1 2 3 4            Remaining  35 34 33 31                Manuals 10/8 IE 10/15/24 10/22 10/29     IASTM RMF A1 pulley/palm 9' 10' 12' 12'                                Neuro Re-Ed                                                                        Ther Ex  20'       TGEs x20 x20 Hook to fist x20 with pencil Hook to fist w/ pencil x20     Digit AROM Towel scrunch x10        Wrist flexor stretch 10 x 5\" 5\" x 20 10 x 5\" 10 x 5\"     Tabletop ext x10 all digits 5\" x 10 10 x 5\" 10 x 5\"     Intrinsic stretch  x10       Blocking ROM         FB rolls for digit extension stretch/massage  Knobby ball x 20 BFB 5' BFB 5'     Isometric finger flexion    10 x 5\" ea              Ther Activity         Digit abd/add  Marbles 1 set Marbles 1 set Marbles 1 set     [place] hops for digit ext, abd, add  Lg marble x 20 Lg marble x20 Lg " "marble x20      HEP POC   Isometric finger flexion 10 x 5\"                       Modalities         MHP 5' 5' pre tx 5' 5'     US 50% pulse, 3.3 mhz, 1.0 w/cm2  8' 8' 8'              "

## 2024-11-05 ENCOUNTER — EVALUATION (OUTPATIENT)
Dept: OCCUPATIONAL THERAPY | Facility: CLINIC | Age: 39
End: 2024-11-05
Payer: COMMERCIAL

## 2024-11-05 DIAGNOSIS — M79.641 RIGHT HAND PAIN: Primary | ICD-10-CM

## 2024-11-05 PROCEDURE — 97110 THERAPEUTIC EXERCISES: CPT

## 2024-11-05 PROCEDURE — 97140 MANUAL THERAPY 1/> REGIONS: CPT

## 2024-11-05 PROCEDURE — 97035 APP MDLTY 1+ULTRASOUND EA 15: CPT

## 2024-11-05 NOTE — PROGRESS NOTES
OT Re-Evaluation    Today's date: 2024  Patient name: Elie Thomas  : 1985  MRN: [id number]  Referring provider: Logan Stewart DO  Dx:   Encounter Diagnosis     ICD-10-CM    1. Right hand pain  M79.641             Start Time: 1215  Stop Time: 1258  Total time in clinic (min): 43 minutes    Assessment  Impairments: activity intolerance, pain with function and activity limitations  Other impairment: Edema  Functional limitations: Difficulty extending digit due to pain, unable to crack joints due to pain  Symptom irritability: low    Assessment details: Elie Thomas is a 39 y.o., Right HD male referred to hand therapy for R hand pain.  Onset of injury 24 due to an injury while bowling. Patient experienced a popping sensation in his hand and has had pain ever since. Patient presents 10/08/24 with impaired strength and edema of the the R hand/wrist.  Deficits also noted in pain with functional use of the right hand. Patient has mild edema at P1 of his RMF. He has pain and tenderness at his A1 pulley of his RMF and the ulnar aspect of P1 of his RMF. He experiences stiffness with digit ROM but ROM is WNLs. Patient is a good candidate for OT services to decrease pain and edema and restore pain-free ROM, strength, coordination for a return to independence in daily tasks.     24: Patient seen 5 times in OT and has demonstrated improvements in pain levels, strength of R hand and MF, and overall function with right hand. Patient continues to be limited in daily activities that involve extreme extension of digit due to pain. His pain has resolved at rest and is only aggravated by hyperextending digit and cracking joints in his RMF. He continues to have swelling around P1 and P2 of RMF which may be contributing to the stiffness in his digit and pain with aforementioned activities/movements. Patient will be placed on hold and follow up with orthopedics on 24 to discuss further medical  intervention.  Understanding of Dx/Px/POC: excellent     Prognosis: good    Goals  STGs (4 weeks)  Patient will be independent in implementing HEP prescribed by therapist MET  Patient will report an average pain level of 4/10 at worst when extending digits PROGRESSING  Patient will demonstrate 15# improvement in R hand  strength for improved functional use of R hand MET  Decrease edema by 50% at P1 of RMF as evident by circumferential measurements. PROGRESSING  LTGs (8 weeks)  Patient will demonstrate independence in a HEP to maintain ROM, strength, and function at discharge ONGOING  Patient will report an average pain level of 1-2/10 to be independent in daily tasks MET AT REST, PROGRESSING AT WORST  Patient will demonstrate R  strength equal to or greater than L hand for improved use dominant hand in work-related activities MET  Patient will achieve goals as demonstrated by FOTO results  Patient will demonstrate resolution of edema NOT MET    Plan  Patient would benefit from: skilled occupational therapy and home program  Planned modality interventions: thermotherapy: hydrocollator packs, ultrasound, cryotherapy and thermotherapy: paraffin bath    Planned therapy interventions: IASTM, kinesiology taping, manual therapy, massage, orthotic fitting/training, orthotic management and training, patient/caregiver education, strengthening, stretching, therapeutic activities, therapeutic exercise, home exercise program, graded exercise, graded activity, functional ROM exercises, flexibility and fine motor coordination training    Frequency: 1x week  Duration in weeks: 8  Plan of Care beginning date: 10/8/2024  Plan of Care expiration date: 12/3/2024  Treatment plan discussed with: patient  Plan details: Patient being placed on hold as he follows up with orthopedics on 11/8/24.        Subjective Evaluation    History of Present Illness  Date of onset: 9/21/2024  Mechanism of injury: Elie Thomas is a 39 y.o. male  "referred to OT for R hand. Patient was bowling and felt a pop in his middle finger and has had pain since, especially with extension. Xrays unremarkable. He is also having pain with lifting bags when the bag weighs down on his fingers. He reports he had one episode of triggering in his RMF this past weekend.     24: Patient reports 70% improvement since beginning OT. He is able to extend his digit back further with less pain. He reports persistent stiffness in this digit when making a composite fist but this is improving. He notes improvement in swelling as well. He overall is in less pain in both his palm and at the ulnar aspect of P1 of RMF. He notices most of his pain with cracking his joints. \"I usually feel pretty pain-free when leaving therapy\"  Patient Goals  Patient goals for therapy: decreased pain and increased motion  Patient goal: \"Be able to close my hand and push it back\"  Pain  Current pain ratin  At best pain ratin  At worst pain ratin  Location: [place] palm proximal to RMF, ulnar aspect of P1 of RMF  Quality: sharp  Relieving factors: heat (HEP, stretching, IASTM)  Exacerbated by: extending RMF, cracking knuckles.  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 Negative for trigger finger.     Additional Observation Details  IE: Mild edema P1 RMF  24: continues to have mild edema at P1    Palpation     Additional Palpation Details  Tenderness over FDS insertion    Tenderness     Right Wrist/Hand   No tenderness in the MCP joint and PIP joint.     Additional Tenderness Details  Tenderness proximal to RMF A1 pulley  24: No tenderness at MCP, PIP, or RMF A1 pulley. Tender to dorsal aspect of P1 when cracking knuckles    Neurological Testing "     Additional Neurological Details  Patient denies any N/T in digits    Active Range of Motion     Right Wrist   Wrist flexion: WFL  Wrist extension: WFl  Radial deviation: WFL  Ulnar deviation: WFL    Right Digits   Flexion   Middle     MCP: 90    PIP: 100    DIP: 80  Middle: 270 degrees    Additional Active Range of Motion Details  11/5/24: RMF improved 15 degrees    Strength/Myotome Testing     Left Wrist/Hand      (2nd hand position)     Trial 1: 100    Thumb Strength  Key/Lateral Pinch     Trial 1: 21  Palmar/Three-Point Pinch     Trial 1: 21    Right Wrist/Hand      (2nd hand position)     Trial 1: 135    Thumb Strength   Key/Lateral Pinch     Trial 1: 25  Palmar/Three-Point Pinch     Trial 1: 22    Additional Strength Details  11/5/24: R  improved 65 lbs, R key improved 1 lb, R 3 pt improved 5 lbs  FDP MMT: 5/5  FDS MMT: 5/5 with mild pain    Tests     Right Wrist/Hand   Negative RCL varus stress and UCL valgus stress.     Swelling     Left Wrist/Hand   Middle     Proximal: 7 cm    Middle: 5.9 cm    Distal: 5.3 cm  Circumference MCP: 21.5 cm    Right Wrist/Hand   Middle     Proximal: 7.5 cm    Middle: 6.2 cm    Distal: 5.4 cm  Circumference MCP: 21.5 cm    Additional Swelling Details  11/5/24: P1 decreased 0.1 cm, P3 decreased 0.2 cm             Precautions: Universal  Access Code: [email or web address]     POC expires Unit limit Auth  expiration date PT/OT + Visit Limit?   12/3/24   12/31/24 36 all therapies pcy                          Visit/Unit Tracking  AUTH Status:  Date 10/8 IE 10/15 10/22 10/29  11/5                 36 all therapy pcy Used 1 2 3 4  5                   Remaining  35 34 33 31  30                       Manuals 10/8 IE 10/15/24 10/22 10/29  11/5     IASTM RMF A1 pulley/palm 9' 10' 12' 12'  12'                                                     Neuro Re-Ed                                                                                                                               Ther Ex   " 20'           TGEs x20 x20 Hook to fist x20 with pencil Hook to fist w/ pencil x20       Digit AROM Towel scrunch x10             Wrist flexor stretch 10 x 5\" 5\" x 20 10 x 5\" 10 x 5\"       Tabletop ext x10 all digits 5\" x 10 10 x 5\" 10 x 5\"  10 x 5\" passive     Intrinsic stretch   x10           Blocking ROM               FB rolls for digit extension stretch/massage   Knobby ball x 20 BFB 5' BFB 5'       Isometric finger flexion       10 x 5\" ea        Re-Eval          BS performed 20'     Ther Activity               Digit abd/add   Marbles 1 set Marbles 1 set Marbles 1 set       [place] hops for digit ext, abd, add   Lg marble x 20 Lg marble x20 Lg marble x20        HEP POC     Isometric finger flexion 10 x 5\"                                       Modalities               MHP 5' 5' pre tx 5' 5'  5'     US 50% pulse, 3.3 mhz, 1.0 w/cm2   8' 8' 8'  8'          "

## 2024-12-13 ENCOUNTER — OFFICE VISIT (OUTPATIENT)
Dept: OBGYN CLINIC | Facility: CLINIC | Age: 39
End: 2024-12-13
Payer: COMMERCIAL

## 2024-12-13 VITALS
SYSTOLIC BLOOD PRESSURE: 136 MMHG | DIASTOLIC BLOOD PRESSURE: 86 MMHG | HEIGHT: 74 IN | BODY MASS INDEX: 32.21 KG/M2 | HEART RATE: 106 BPM | WEIGHT: 251 LBS

## 2024-12-13 DIAGNOSIS — S69.91XD INJURY OF RIGHT MIDDLE FINGER, SUBSEQUENT ENCOUNTER: Primary | ICD-10-CM

## 2024-12-13 DIAGNOSIS — S86.912A KNEE STRAIN, LEFT, INITIAL ENCOUNTER: ICD-10-CM

## 2024-12-13 PROCEDURE — 99213 OFFICE O/P EST LOW 20 MIN: CPT | Performed by: ORTHOPAEDIC SURGERY

## 2024-12-13 NOTE — PROGRESS NOTES
Assessment/Plan:   Diagnoses and all orders for this visit:    Injury of right middle finger, subsequent encounter    Knee strain, left, initial encounter      Right hand is doing well. He may be as active as he can tolerate with his hand.     Physical exam was performed on the Left knee. He was diagnosed with a left knee strain and exercises were provided  I recommend he return in 8 weeks to be reexamined, if he has no improvement with the exercises we can consider an MRI    The patient is doing much better regards to his right middle finger injury.  There is full strength full motion.  No instability.  He states he had diffuse left knee pain for the past several weeks.  He was given home exercise program.  Return back 2 months evaluation.  Is any further knee pain at that point, an MRI may be warranted        Subjective:   Patient ID: Elie Thomas  1985     HPI  Patient is a 39 y.o. male who presents for follow up of right middle finger. He states he is doing better since last visit He does have improved functional use of his hand. He does still have complaints of not being able to crack his knuckle    He is also having complaints of left knee pain and feelings of instability at times. He denies any injury. This has hiram happening for several weeks now        The following portions of the patient's history were reviewed and updated as appropriate:  Past medical history, past surgical history, Family history, social history, current medications and allergies    Past Medical History:   Diagnosis Date    GERD (gastroesophageal reflux disease)     History of stomach ulcers     Male infertility, unspecified     Varicocele    Moderate episode of recurrent major depressive disorder (HCC) 5/5/2021    Sleep apnea, obstructive        Past Surgical History:   Procedure Laterality Date    EYE SURGERY Left     NASAL SEPTUM SURGERY N/A     NASAL SEPTUM SURGERY      [place] ESOPHAGOGASTRODUODENOSCOPY TRANSORAL DIAGNOSTIC N/A  3/25/2019    Procedure: ESOPHAGOGASTRODUODENOSCOPY (EGD);  Surgeon: Cesar Zuluaga DO;  Location: [facility];  Service: Gastroenterology       Family History   Problem Relation Age of Onset    Hypothyroidism Mother     Heart disease Father        Social History     Socioeconomic History    Marital status: /Civil Union     Spouse name: None    Number of children: None    Years of education: None    Highest education level: None   Occupational History    None   Tobacco Use    Smoking status: Never    Smokeless tobacco: Never   Vaping Use    Vaping status: Never Used   Substance and Sexual Activity    Alcohol use: Yes     Comment: social-[facility]    Drug use: Never    Sexual activity: None   Other Topics Concern    None   Social History Narrative    None     Social Drivers of Health     Financial Resource Strain: Not on file   Food Insecurity: Not on file   Transportation Needs: Not on file   Physical Activity: Not on file   Stress: Not on file   Social Connections: Not on file   Intimate Partner Violence: Not on file   Housing Stability: Not on file         Current Outpatient Medications:     albuterol (PROVENTIL HFA,VENTOLIN HFA) 90 mcg/act inhaler, Inhale 2 puffs every 6 (six) hours as needed for wheezing, Disp: 8 g, Rfl: 0    amLODIPine (NORVASC) 10 mg tablet, Take 1 tablet (10 mg total) by mouth daily, Disp: 90 tablet, Rfl: 1    esomeprazole (NexIUM) 40 MG capsule, TAKE ONE CAPSULE BY MOUTH EVERY DAY, Disp: 90 capsule, Rfl: 0    fluticasone (FLONASE) 50 mcg/act nasal spray, USE 1 SPRAY IN EACH NOSTRIL ONCE A DAY, Disp: 16 g, Rfl: 1    fluvoxaMINE (LUVOX) 100 mg tablet, TAKE ONE TABLET BY MOUTH EVERY DAY AT BEDTIME, Disp: 90 tablet, Rfl: 1    meclizine (ANTIVERT) 25 mg tablet, TAKE ONE TABLET BY MOUTH THREE TIMES A DAY AS NEEDED FOR DIZZINESS, Disp: 30 tablet, Rfl: 0    ondansetron (ZOFRAN-ODT) 4 mg disintegrating tablet, Take 1 tablet (4 mg total) by mouth every 6 (six) hours as needed for nausea or  "vomiting, Disp: 12 tablet, Rfl: 0    No Known Allergies    Review of Systems   Constitutional:  Negative for chills and fever.   HENT:  Negative for ear pain and sore throat.    Eyes:  Negative for pain and visual disturbance.   Respiratory:  Negative for cough and shortness of breath.    Cardiovascular:  Negative for chest pain and palpitations.   Gastrointestinal:  Negative for abdominal pain and vomiting.   Genitourinary:  Negative for dysuria and hematuria.   Musculoskeletal:  Negative for arthralgias and back pain.   Skin:  Negative for color change and rash.   Neurological:  Negative for seizures and syncope.   All other systems reviewed and are negative.       Objective:  /86   Pulse (!) 106   Ht 6' 2\" (1.88 m)   Wt 114 kg (251 lb)   BMI 32.23 kg/m²     Ortho Exam  right Hand -  Middle Finger  Patient presents with no obvious anatomical deformity  Skin is warm and dry to touch with no signs of erythema, ecchymosis, or infection  No soft tissue swelling or effusion noted  Full FDS, FDP, extensor mechanisms are intact  No rotational deformity with composite finger flexion  Non TTP with middle finger flexor tendon  - Reproducible triggering on exam  Demonstrates normal wrist, elbow, and shoulder motion  Composite fist formation  Forearm compartments are soft and supple  2+ distal radial pulse with brisk capillary refill to the fingers  Radial, median, and ulnar motor and sensory distribution intact  Sensations light to touch intact distally    left knee(s) -   Patient ambulates with steady gait pattern  Uses No assistive device  No anatomical deformity  Skin is warm and dry to touch with no signs of erythema, ecchymosis, or infection   No soft tissue swelling or effusion noted  ROM 0-120  Strength: 5/5 throughout   No tenderness to palpation on exam  Flexor and extensor mechanisms are intact   Knee is is stable to varus and valgus stress  Negative  Lachman's  Negative  Anterior Drawer,   Calf " compartments are soft and supple  Negative Jewel's sign  2+ DP and PT pulses with brisk capillary refill to the toes  Sural, saphenous, tibial, superficial, and deep peroneal motor and sensory distributions intact  Sensation light touch intact distally    Physical Exam  Vitals and nursing note reviewed.   Constitutional:       General: He is not in acute distress.     Appearance: He is well-developed.   HENT:      Head: Normocephalic and atraumatic.   Eyes:      Conjunctiva/sclera: Conjunctivae normal.   Cardiovascular:      Rate and Rhythm: Normal rate and regular rhythm.      Heart sounds: No murmur heard.  Pulmonary:      Effort: Pulmonary effort is normal. No respiratory distress.      Breath sounds: Normal breath sounds.   Abdominal:      Palpations: Abdomen is soft.      Tenderness: There is no abdominal tenderness.   Musculoskeletal:         General: No swelling.      Cervical back: Neck supple.   Skin:     General: Skin is warm and dry.      Capillary Refill: Capillary refill takes less than 2 seconds.   Neurological:      Mental Status: He is alert.   Psychiatric:         Mood and Affect: Mood normal.        Diagnostic Test Review:    No new images obtained/reviewed      X-Ray of right hand obtained on 9/30/2024 were reviewed and demonstrate no acute osseous abnormalities.      Procedures   None performed at today's visit    Scribe Attestation      I,:  Elie Munoz am acting as a scribe while in the presence of the attending physician.:       I,:  Logan Stewart, DO personally performed the services described in this documentation    as scribed in my presence.:

## 2024-12-30 ENCOUNTER — OFFICE VISIT (OUTPATIENT)
Dept: OBGYN CLINIC | Facility: CLINIC | Age: 39
End: 2024-12-30
Payer: COMMERCIAL

## 2024-12-30 ENCOUNTER — APPOINTMENT (OUTPATIENT)
Dept: RADIOLOGY | Facility: CLINIC | Age: 39
End: 2024-12-30
Payer: COMMERCIAL

## 2024-12-30 VITALS — BODY MASS INDEX: 33.21 KG/M2 | HEIGHT: 74 IN | WEIGHT: 258.8 LBS

## 2024-12-30 DIAGNOSIS — M25.562 ACUTE PAIN OF LEFT KNEE: ICD-10-CM

## 2024-12-30 DIAGNOSIS — M25.562 CHRONIC PAIN OF LEFT KNEE: ICD-10-CM

## 2024-12-30 DIAGNOSIS — S86.812A STRAIN OF CALF MUSCLE, LEFT, INITIAL ENCOUNTER: Primary | ICD-10-CM

## 2024-12-30 DIAGNOSIS — G89.29 CHRONIC PAIN OF LEFT KNEE: ICD-10-CM

## 2024-12-30 PROCEDURE — 99213 OFFICE O/P EST LOW 20 MIN: CPT | Performed by: ORTHOPAEDIC SURGERY

## 2024-12-30 PROCEDURE — 73564 X-RAY EXAM KNEE 4 OR MORE: CPT

## 2024-12-30 NOTE — PROGRESS NOTES
Patient Name:  Elie Thomas  MRN:  [id number]    Assessment & Plan     1. Strain of calf muscle, left, initial encounter  -     Ambulatory Referral to Physical Therapy; Future  2. Acute pain of left knee  -     XR knee 4+ vw left injury; Future; Expected date: 12/30/2024  -     Ambulatory Referral to Physical Therapy; Future      Left knee pain and suspected calf strain  X-rays reviewed in office today with patient  Treatment options were discussed including physical therapy vs obtaining MRI study  Referral provided for outpatient physical therapy  No specific activity restrictions. Avoid aggravating positions  OTC analgesics as needed for symptom management  Follow up 6-8 weeks, can consider MRI study at that time if symptoms do not resolve    Chief Complaint     Left knee pain    History of the Present Illness     Elie Thomas is a 39 y.o. male with Left knee pain ongoing for a month or two. He has pain when standing after sitting for a period of time. He has pain while sitting on the floor to play with his son. Feels like his knee might buckle while he's standing. No instability events. No physical therapy or injection for the knee. No numbness or tingling. No mechanical locking. He works a desk job. Pain sometimes radiates into his calf, mostly localized to anterior and lateral knee. No groin pain. No back pain.    Review of Systems     Review of Systems   Constitutional:  Negative for chills and fever.   HENT:  Negative for ear pain and sore throat.    Eyes:  Negative for pain and visual disturbance.   Respiratory:  Negative for cough and shortness of breath.    Cardiovascular:  Negative for chest pain and palpitations.   Gastrointestinal:  Negative for abdominal pain and vomiting.   Genitourinary:  Negative for dysuria and hematuria.   Musculoskeletal:  Negative for arthralgias and back pain.   Skin:  Negative for color change and rash.   Neurological:  Negative for seizures and syncope.   All other  "systems reviewed and are negative.      Physical Exam     Ht 6' 2\" (1.88 m)   Wt 117 kg (258 lb 12.8 oz)   BMI 33.23 kg/m²     Left  Knee  Range of motion from 0 to 130.    There is no crepitus with range of motion.   There is no effusion.    There is mild tenderness over the proximal fibula.    There is 5/5 quadriceps strength and equal tone.    The patient is able to perform a straight leg raise.    negative patellar grind test.  Anterior drawer tests is negative  negative Lachman Test.   Posterior drawer test is   negative   Varus stress testing reveals no instability at 0 and 30 degrees   Valgus stress testing reveals no instability at 0 and 30 degrees  Aaron's testing is positive laterally   Thessely's testing is equivocal laterally  Calf is supple and non tender  The patient is neurovascular intact distally.      Eyes:  Anicteric sclerae.  Neck:  Supple.  Lungs:  Normal respiratory effort.  Cardiovascular:  Capillary refill is less than 2 seconds.  Skin:  Intact without erythema.  Neurologic:  Sensation grossly intact to light touch.  Psychiatric:  Mood and affect are appropriate.    Data Review     I have personally reviewed pertinent films in PACS, and my interpretation follows:    X-rays taken 12/20/2024 of Left knee well preserved joint space, no acute fracture or dislocation.     Past Medical History:   Diagnosis Date    GERD (gastroesophageal reflux disease)     History of stomach ulcers     Male infertility, unspecified     Varicocele    Moderate episode of recurrent major depressive disorder (HCC) 5/5/2021    Sleep apnea, obstructive        Past Surgical History:   Procedure Laterality Date    EYE SURGERY Left     NASAL SEPTUM SURGERY N/A     NASAL SEPTUM SURGERY      [place] ESOPHAGOGASTRODUODENOSCOPY TRANSORAL DIAGNOSTIC N/A 3/25/2019    Procedure: ESOPHAGOGASTRODUODENOSCOPY (EGD);  Surgeon: Cesar Zuluaga DO;  Location: [facility];  Service: Gastroenterology       No Known Allergies    Current " Outpatient Medications on File Prior to Visit   Medication Sig Dispense Refill    albuterol (PROVENTIL HFA,VENTOLIN HFA) 90 mcg/act inhaler Inhale 2 puffs every 6 (six) hours as needed for wheezing 8 g 0    amLODIPine (NORVASC) 10 mg tablet Take 1 tablet (10 mg total) by mouth daily 90 tablet 1    esomeprazole (NexIUM) 40 MG capsule TAKE ONE CAPSULE BY MOUTH EVERY DAY 90 capsule 0    fluticasone (FLONASE) 50 mcg/act nasal spray USE 1 SPRAY IN EACH NOSTRIL ONCE A DAY 16 g 1    fluvoxaMINE (LUVOX) 100 mg tablet TAKE ONE TABLET BY MOUTH EVERY DAY AT BEDTIME 90 tablet 1    meclizine (ANTIVERT) 25 mg tablet TAKE ONE TABLET BY MOUTH THREE TIMES A DAY AS NEEDED FOR DIZZINESS 30 tablet 0    ondansetron (ZOFRAN-ODT) 4 mg disintegrating tablet Take 1 tablet (4 mg total) by mouth every 6 (six) hours as needed for nausea or vomiting 12 tablet 0     No current facility-administered medications on file prior to visit.       Social History     Tobacco Use    Smoking status: Never    Smokeless tobacco: Never   Vaping Use    Vaping status: Never Used   Substance Use Topics    Alcohol use: Yes     Comment: [facility]    Drug use: Never       Family History   Problem Relation Age of Onset    Hypothyroidism Mother     Heart disease Father              Procedures Performed     Procedures  None      Yinka Quinteros DO  Scribe Attestation      I,:  Lynette Damon am acting as a scribe while in the presence of the attending physician.:       I,:  Yinka Quinteros DO personally performed the services described in this documentation    as scribed in my presence.:

## 2025-01-08 DIAGNOSIS — K29.00 OTHER ACUTE GASTRITIS WITHOUT HEMORRHAGE: ICD-10-CM

## 2025-01-09 ENCOUNTER — EVALUATION (OUTPATIENT)
Dept: PHYSICAL THERAPY | Facility: CLINIC | Age: 40
End: 2025-01-09
Payer: COMMERCIAL

## 2025-01-09 DIAGNOSIS — S86.812D STRAIN OF CALF MUSCLE, LEFT, SUBSEQUENT ENCOUNTER: ICD-10-CM

## 2025-01-09 DIAGNOSIS — M25.562 ACUTE PAIN OF LEFT KNEE: Primary | ICD-10-CM

## 2025-01-09 PROCEDURE — 97161 PT EVAL LOW COMPLEX 20 MIN: CPT

## 2025-01-09 PROCEDURE — 97110 THERAPEUTIC EXERCISES: CPT

## 2025-01-09 RX ORDER — ESOMEPRAZOLE MAGNESIUM 40 MG/1
40 CAPSULE, DELAYED RELEASE ORAL DAILY
Qty: 90 CAPSULE | Refills: 0 | Status: SHIPPED | OUTPATIENT
Start: 2025-01-09

## 2025-01-09 NOTE — PROGRESS NOTES
"PT Evaluation     Today's date: 2025  Patient name: Elie Thomas  : 1985  MRN: [id number]  Referring provider: Yinka Quinteros DO  Dx:   Encounter Diagnosis     ICD-10-CM    1. Acute pain of left knee  M25.562 Ambulatory Referral to Physical Therapy      2. Strain of calf muscle, left, subsequent encounter  S86.812D Ambulatory Referral to Physical Therapy                     Assessment  Impairments: abnormal or restricted ROM, activity intolerance, impaired balance, impaired physical strength, lacks appropriate home exercise program, pain with function and weight-bearing intolerance  Symptom irritability: moderate    Assessment details: Elie Thomas is a 39 y.o. male presenting to physical therapy for an initial evaluation with a MD referral of acute pain of left knee, strain of left calf muscle. The patient demonstrates WNL left knee and ankle AROM. No TTP throughout ligamentous structures or joint line tenderness and no special testing suggestive of ligament or meniscal tears. Was able to reproduce lateral calf pain with weightbearing single leg heel raises on the left lower extremity and did display significant TTP in the proximal lateral gastrocnemius attachment. He also displayed mild knee weakness. These deficits have limited the patient's ability to complete ADLs, sit to stand transfers, vocational responsibilities, and recreational activities of playing with his daughter. This patient would benefit from OP PT services to address their impairments and functional limitations to maximize functional mobility. The patient was educated on anatomy and pathophysiology of the knee joint with differential diagnosis of calf strain. He was provided a HEP and demonstrated/verbalized understanding all education.    Understanding of Dx/Px/POC: excellent     Prognosis: good    Goals  STG to be achieved in 4 weeks:   The patient will report a decrease in left knee \"at worst\" subjective pain rating score to " at least 3/10 to allow for improved tolerance for weightbearing activities.   The patient will be able to complete 10 SL HR on the left lower extremity with <3/10 pain demonstrating improved calf strength.     LTG to be achieved by DC:   The patient will be independent in comprehensive HEP.   The patient will report no pain with usual activities.   The patient will increase all left knee MMT score to WFL to allow for improved functional mobility.   The patient will be able to complete sit to stand and floor to stand transfers without knee pain allowing for play with his daughter.       Plan  Patient would benefit from: skilled physical therapy  Planned modality interventions: thermotherapy: hydrocollator packs, TENS and cryotherapy    Planned therapy interventions: manual therapy, joint mobilization, balance/weight bearing training, neuromuscular re-education, patient education, flexibility, strengthening, stretching, therapeutic activities, therapeutic exercise, gait training, home exercise program and IASTM    Frequency: 1-2x per week.  Duration in weeks: 8  Plan of Care beginning date: 1/9/2025  Plan of Care expiration date: 3/6/2025  Treatment plan discussed with: patient        Subjective Evaluation    History of Present Illness  Mechanism of injury: Elie presents with chief complaints of left knee pain ongoing since approximately October/November. He doesn't remember any HERMAN or triggering event. 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 He reports an instability and a buckling sensation in his left knee when standing up from either the ground or a chair. He says he most often notices that his knee is aggravated after sitting in a herbert cross position when on the ground playing with his daughter or when wrapping [place] presents. He says that he often has to pull himself to a standing position with assistance of furniture and then limps for a  "period of time (10 steps) until the pain resolves. He also reports pain with pivoting or twisting movements of his left knee. With pivoting, he experiences a pain which radiates from the posterior aspect of the knee to the mid calf. Other times, the pain is on the lateral aspect of the knee and more rarely along the joint line. He did trial an OTC hinged knee brace and didn't feel like he didn't have symptoms when it was on, but when he stopped wearing it he still had symptoms which caused him to ultimately stop wearing it. He denies clicking, popping, catching sensations.    He saw Orthopedics on 24 at which time Xrays were performed on the left knee. Results revealed, \"no acute fracture or dislocation. There is no joint effusion. No significant degenerative changes\". He was advised to avoid aggravating positions/activities and on use of OTC medications as needed for pain. He is to follow up on 25 and if no improvement is made in his symptoms, then he may be referred for an MRI.      Patient Goals  Patient goals for therapy: decreased pain and increased strength    Pain  Current pain ratin  At best pain ratin  At worst pain ratin  Location: Lateral aspect of left knee  Quality: dull ache and discomfort  Relieving factors: change in position, ice and medications  Aggravating factors: sitting, standing and walking  Progression: no change    Social Support  Lives with: young children and spouse    Employment status: working ([employer] work)    Diagnostic Tests  X-ray: normal  Treatments  No previous or current treatments  Current treatment: physical therapy        Objective     Palpation   Left   No palpable tenderness to the distal semimembranosus, distal semitendinosus and medial gastrocnemius.   Tenderness of the distal biceps femoris and lateral gastrocnemius.     Tenderness   Left Knee   No tenderness in the inferior patella, lateral joint line, lateral patella, LCL " "(distal), LCL (proximal), MCL (distal), MCL (proximal), medial joint line, medial patella, patellar tendon, popliteal fossa, quadriceps tendon and superior patella.     Active Range of Motion   Left Knee   Normal active range of motion    Right Knee   Normal active range of motion  Left Ankle/Foot   Normal active range of motion    Right Ankle/Foot   Normal active range of motion    Mobility   Patellar Mobility:   Left Knee   WFL: medial, lateral, superior and inferior.     Right Knee   WFL: medial, lateral, superior and inferior    Strength/Myotome Testing     Left Hip   Planes of Motion   Flexion: 5  Abduction: 4+    Right Hip   Planes of Motion   Flexion: 5  Abduction: 4+    Left Knee   Flexion: 4 (\"pain posterior knee and calf\")  Extension: 4+  Quadriceps contraction: good    Right Knee   Normal strength  Quadriceps contraction: good    Additional Strength Details  4-5/10 Pain at proximal lateral gastroc attachment with L SL Heel Raises     Tests     Left Knee   Negative anterior drawer, lateral Aaron, medial Aaron, patellar compression, posterior drawer, Thessaly's test at 5 degrees, Thessaly's test at 20 degrees, valgus stress test at 0 degrees and varus stress test at 0 degrees.     Ambulation   Weight-Bearing Status   Assistive device used: none    Ambulation: Level Surfaces   Ambulation without assistive device: independent    Observational Gait   Gait: within functional limits              Precautions: Standard  Access Code: [email or web address]      POC expires Unit limit Auth  expiration date PT/OT + Visit Limit?   3/6/25 4 N/A 36 per cly                 Visit/Unit Tracking  AUTH Status:  Date 1/9              36 per cly Used 1               Remaining  35                   Manuals 1/9            IASTM L prox lateral gastroc                                                    Neuro Re-Ed             Rockerboard taps fwd/bwd, left/right     With holds              L SLS             Star slider exercise            "   Bwd lunge with slider                                                     Ther Ex             Upright bike for LE endurance             Standing calf stretch at wall vs long sitting with strap HEP             Standing HR off step ecc focus HEP            Standing HR with tennis ball between ankles HEP            TG SL HR             Matrix knee ext             Matrix hamstring curls                          Pt education PT POC, HEP, anatomy, pathophysiology                          Ther Activity             SL TG squats             Bosu squats             Bosu fwd and lat step ups                           Gait Training                                       Modalities

## 2025-01-16 ENCOUNTER — OFFICE VISIT (OUTPATIENT)
Dept: PHYSICAL THERAPY | Facility: CLINIC | Age: 40
End: 2025-01-16
Payer: COMMERCIAL

## 2025-01-16 DIAGNOSIS — M25.562 ACUTE PAIN OF LEFT KNEE: Primary | ICD-10-CM

## 2025-01-16 DIAGNOSIS — S86.812D STRAIN OF CALF MUSCLE, LEFT, SUBSEQUENT ENCOUNTER: ICD-10-CM

## 2025-01-16 PROCEDURE — 97112 NEUROMUSCULAR REEDUCATION: CPT

## 2025-01-16 PROCEDURE — 97530 THERAPEUTIC ACTIVITIES: CPT

## 2025-01-16 PROCEDURE — 97110 THERAPEUTIC EXERCISES: CPT

## 2025-01-16 NOTE — PROGRESS NOTES
"Daily Note     Today's date: 2025  Patient name: Elie Thomas  : 1985  MRN: [id number]  Referring provider: Yinka Quinteros DO  Dx:   Encounter Diagnosis     ICD-10-CM    1. Acute pain of left knee  M25.562       2. Strain of calf muscle, left, subsequent encounter  S86.248E                      Subjective: Patient reports no changes to his calf/ posterior medial knee. Does continue to be bothersome.       Objective: See treatment diary below      Assessment: Tolerated treatment well. Patient able to complete all exercises without increased lateral proximal hamstring/calf pain production. Tolerated IASTM well; advised on side effects of redness/bruising and verbalized understanding. Also educated on DOMS following session. Patient demonstrated fatigue post treatment, exhibited good technique with therapeutic exercises, and would benefit from continued PT      Plan: Continue per plan of care.      Precautions: Standard  Access Code: [email or web address]      POC expires Unit limit Auth  expiration date PT/OT + Visit Limit?   3/6/25 4 N/A 36 per cly                 Visit/Unit Tracking  AUTH Status:  Date              36 per cly Used 1 2              Remaining  35 34                  Manuals            IASTM L prox lateral gastroc  BE                                                  Neuro Re-Ed             Rockerboard taps fwd/bwd, left/right     With holds   20x ea dir     2x15\"            L SLS  3x30\"           Star slider exercise              Bwd lunge with slider                                                     Ther Ex             Upright bike for LE endurance  Rec L1 10'           Standing calf stretch at wall vs long sitting with strap HEP  Prostretch   3x30\"           Standing HR off step ecc focus HEP            Standing HR with tennis ball between ankles HEP            SL TG HR  L22 2x10           Matrix knee ext             Matrix hamstring curls                          Pt education " PT POC, HEP, anatomy, pathophysiology                          Ther Activity             SL TG squats  L22 2x10           Bosu squats  2x10           Bosu fwd and lat step ups   15x ea                        Gait Training                                       Modalities

## 2025-01-24 ENCOUNTER — OFFICE VISIT (OUTPATIENT)
Dept: PHYSICAL THERAPY | Facility: CLINIC | Age: 40
End: 2025-01-24
Payer: COMMERCIAL

## 2025-01-24 DIAGNOSIS — M25.562 ACUTE PAIN OF LEFT KNEE: Primary | ICD-10-CM

## 2025-01-24 DIAGNOSIS — S86.812D STRAIN OF CALF MUSCLE, LEFT, SUBSEQUENT ENCOUNTER: ICD-10-CM

## 2025-01-24 PROCEDURE — 97110 THERAPEUTIC EXERCISES: CPT | Performed by: PHYSICAL MEDICINE & REHABILITATION

## 2025-01-24 PROCEDURE — 97140 MANUAL THERAPY 1/> REGIONS: CPT | Performed by: PHYSICAL MEDICINE & REHABILITATION

## 2025-01-24 PROCEDURE — 97112 NEUROMUSCULAR REEDUCATION: CPT | Performed by: PHYSICAL MEDICINE & REHABILITATION

## 2025-01-24 NOTE — PROGRESS NOTES
"Daily Note     Today's date: 2025  Patient name: Elie Thomas  : 1985  MRN: [id number]  Referring provider: Yinka Quinteros DO  Dx:   Encounter Diagnosis     ICD-10-CM    1. Acute pain of left knee  M25.562       2. Strain of calf muscle, left, subsequent encounter  S86.792D                    Subjective: Patient reports his knee is feeling \"ok.\" No issues following last visit.     Objective: See treatment diary below    Assessment: Tolerated treatment well. Able to complete all activity as requested without reported pain provocation. Mild restrictions noted with IASTM today. Patient demonstrated fatigue post treatment, exhibited good technique with therapeutic exercises, and would benefit from continued PT.     Plan: Continue per plan of care.      Precautions: Standard  Access Code: [email or web address]      POC expires Unit limit Auth  expiration date PT/OT + Visit Limit?   3/6/25 4 N/A 36 per cly                 Visit/Unit Tracking  AUTH Status:  Date             36 per cly Used 1 2 3             Remaining  35 34 33                 Manuals           IASTM L prox lateral gastroc  BE LH                                                 Neuro Re-Ed             Rockerboard taps fwd/bwd, left/right     With holds   20x ea dir     2x15\"  20x  ea taps, bal 2x15\" ea          L SLS  3x30\" 3x30\" foam         Star slider exercise              Bwd lunge with slider                                                     Ther Ex             Upright bike for LE endurance  Rec L1 10' 10' L1          Standing calf stretch at wall vs long sitting with strap HEP  Prostretch   3x30\" 3x30\" ea gastroc/soleus          Standing HR off step ecc focus HEP            Standing HR with tennis ball between ankles HEP            SL TG HR  L22 2x10 L22 3x10          Matrix knee ext             Matrix hamstring curls                          Pt education PT POC, HEP, anatomy, pathophysiology                     "      Ther Activity   1/24          SL TG squats  L22 2x10 L22 3x10          Bosu squats  2x10 2x10          Bosu fwd and lat step ups   15x ea 20x ea                       Gait Training                                       Modalities

## 2025-01-31 ENCOUNTER — OFFICE VISIT (OUTPATIENT)
Dept: PHYSICAL THERAPY | Facility: CLINIC | Age: 40
End: 2025-01-31
Payer: COMMERCIAL

## 2025-01-31 DIAGNOSIS — S86.812D STRAIN OF CALF MUSCLE, LEFT, SUBSEQUENT ENCOUNTER: ICD-10-CM

## 2025-01-31 DIAGNOSIS — M25.562 ACUTE PAIN OF LEFT KNEE: Primary | ICD-10-CM

## 2025-01-31 PROCEDURE — 97140 MANUAL THERAPY 1/> REGIONS: CPT

## 2025-01-31 PROCEDURE — 97110 THERAPEUTIC EXERCISES: CPT

## 2025-01-31 NOTE — PROGRESS NOTES
"Daily Note    Today's date: 25  Patient name: Elie Thomas  : 1985  MRN: [id number]  Referring provider: Yinka Quinteros DO  Dx:   Encounter Diagnosis     ICD-10-CM    1. Acute pain of left knee  M25.562       2. Strain of calf muscle, left, subsequent encounter  S86.812D           Start Time: 1145  Stop Time: 1230  Total time in clinic (min): 45 minutes      Subjective: Elie reports no c/o today. He notes no changes from previous visit.    Objective: See treatment diary below.    Assessment: Elie tolerated treatment well with minimal cuing. TE's were performed with increased reps. No new TE's were performed today. Following treatment, the patient demonstrated fatigue and would benefit from continued physical therapy.    Plan: Continue per plan of care.  Progress treatment as tolerated.         Precautions: Standard  Access Code: [email or web address]      POC expires Unit limit Auth  expiration date PT/OT + Visit Limit?   3/6/25 4 N/A 36 per cly                 Visit/Unit Tracking  AUTH Status:  Date            36 per cly Used 1 2 3 4            Remaining  35 34 33 32                Manuals          IASTM L prox lateral gastroc  BE LH TS                                                Neuro Re-Ed             Rockerboard taps fwd/bwd, left/right     With holds   20x ea dir     2x15\"  20x  ea taps, bal 2x15\" ea          L SLS  3x30\" 3x30\" Foam 3x30\"         Star slider exercise              Bwd lunge with slider                                                     Ther Ex             Upright bike for LE endurance  Rec L1 10' 10' L1 10' L1          Standing calf stretch at wall vs long sitting with strap HEP  Prostretch   3x30\" 3x30\" ea gastroc/soleus 3x30\" ea          Standing HR off step ecc focus HEP            Standing HR with tennis ball between ankles HEP            SL TG HR  L22 2x10 L22 3x10 L22 3x10          Matrix knee ext             Matrix hamstring curls      "                     Pt education PT POC, HEP, anatomy, pathophysiology                          Ther Activity   1/24          SL TG squats  L22 2x10 L22 3x10 L22 3x10          Bosu squats  2x10 2x10 2x10          Bosu fwd and lat step ups   15x ea 20x ea 2x10 ea                      Gait Training                                       Modalities                                            Iban Worthington, PT  1/31/2025,2:17 PM

## 2025-02-07 ENCOUNTER — EVALUATION (OUTPATIENT)
Dept: PHYSICAL THERAPY | Facility: CLINIC | Age: 40
End: 2025-02-07
Payer: COMMERCIAL

## 2025-02-07 DIAGNOSIS — S86.812D STRAIN OF CALF MUSCLE, LEFT, SUBSEQUENT ENCOUNTER: ICD-10-CM

## 2025-02-07 DIAGNOSIS — M25.562 ACUTE PAIN OF LEFT KNEE: Primary | ICD-10-CM

## 2025-02-07 PROCEDURE — 97110 THERAPEUTIC EXERCISES: CPT

## 2025-02-07 PROCEDURE — 97140 MANUAL THERAPY 1/> REGIONS: CPT

## 2025-02-07 NOTE — PROGRESS NOTES
"PT Re-Evaluation     Today's date: 2025  Patient name: Elie Thomas  : 1985  MRN: [id number]  Referring provider: Yinka Quinteros DO  Dx:   Encounter Diagnosis     ICD-10-CM    1. Acute pain of left knee  M25.562       2. Strain of calf muscle, left, subsequent encounter  S86.812D                      Assessment  Impairments: activity intolerance, impaired balance, impaired physical strength, lacks appropriate home exercise program, pain with function and weight-bearing intolerance  Symptom irritability: moderate    Assessment details: Elie Thomas is a 39 y.o. male presenting to physical therapy for a reevaluation with a MD referral of acute pain of left knee, strain of left calf muscle. Since their initial evaluation, he reports 60% improvement in his left calf strain. Subjectively, he reports decreased intensity and frequency in which he experiences lateral calf pain. He reports his symptoms are most often aggravated with standing up from a seated position if his left leg is in a figure 4 position. Objectively, he demonstrates improved calf, hip, and knee strength. He displays improved ability to complete L SL HR's with less pain reported during/after exercise completion. He also is less sensitive to palpation a the lateral gastrocnemius attachment, although this does continue to be provocative for him. Despite his improvements, he continues to have decreased knee and calf strength, TTP in the lateral gastrocnemius leading to limitations in their ability to complete ADLs, sit to stand transfers, vocational responsibilities, and recreational activities of playing with his daughter. This patient would benefit from continued PT services to address their impairments and functional limitations to maximize functional outcome.    Understanding of Dx/Px/POC: excellent     Prognosis: good    Goals  STG to be achieved in 4 weeks:   The patient will report a decrease in left knee \"at worst\" subjective pain " rating score to at least 3/10 to allow for improved tolerance for weightbearing activities. NOT MET  The patient will be able to complete 10 SL HR on the left lower extremity with <3/10 pain demonstrating improved calf strength. MET- 3/10    LTG to be achieved by DC:    The patient will be independent in comprehensive HEP. MET  The patient will report no pain with usual activities.  MET  The patient will increase all left knee MMT score to WFL to allow for improved functional mobility. NOT MET  The patient will be able to complete sit to stand and floor to stand transfers without knee pain allowing for play with his daughter. NOT MET      Plan  Patient would benefit from: skilled physical therapy  Planned modality interventions: thermotherapy: hydrocollator packs, TENS and cryotherapy    Planned therapy interventions: manual therapy, joint mobilization, balance/weight bearing training, neuromuscular re-education, patient education, flexibility, strengthening, stretching, therapeutic activities, therapeutic exercise, gait training, home exercise program and IASTM    Frequency: 1-2x per week.  Duration in weeks: 8  Plan of Care beginning date: 2/7/2025  Plan of Care expiration date: 4/4/2025  Treatment plan discussed with: patient        Subjective Evaluation    History of Present Illness  Mechanism of injury: Elie reports 60% improvement since beginning PT services. He reports that the pain he was experiencing in the lateral proximal calf area is less intense than what it had been prior to PT. He also feels that the pain is occurring less frequently.  He denies radiating symptoms at this point and feels that the pain is now more localized to the lateral proximal calf. He reports that he does still sit in a figure 4 position in his desk chair and is able to assume this position without production of pain. However, when he goes to stand up from this seated position, he does have pain which causes him to limp for  approximately 10 steps or so before it resolves. He says he can stand up from the dinner table or his couch and walk without any onset of pain. He notes that it's isolated to the times that he sits in that figure 4 position. He has not noticed pain occurring with pivoting or twisting movements as of recently. He is to follow up with Orthopedics on 25.   Patient Goals  Patient goals for therapy: decreased pain and increased strength    Pain  Current pain ratin  At best pain ratin  At worst pain ratin  Location: Lateral aspect of left knee  Quality: dull ache and discomfort  Relieving factors: change in position, ice and medications  Aggravating factors: sitting (Sitting in figure 4 position at desk)  Progression: improved    Social Support  Lives with: young children and spouse    Employment status: working ([employer] work)    Diagnostic Tests  X-ray: normal  Treatments  No previous or current treatments  Current treatment: physical therapy        Objective     Palpation   Left   No palpable tenderness to the distal semimembranosus, distal semitendinosus and medial gastrocnemius.   Tenderness of the distal biceps femoris and lateral gastrocnemius.     Tenderness   Left Knee   No tenderness in the inferior patella, lateral joint line, lateral patella, LCL (distal), LCL (proximal), MCL (distal), MCL (proximal), medial joint line, medial patella, patellar tendon, popliteal fossa, quadriceps tendon and superior patella.     Active Range of Motion   Left Knee   Normal active range of motion    Right Knee   Normal active range of motion  Left Ankle/Foot   Normal active range of motion    Right Ankle/Foot   Normal active range of motion    Mobility   Patellar Mobility:   Left Knee   WFL: medial, lateral, superior and inferior.     Right Knee   WFL: medial, lateral, superior and inferior    Strength/Myotome Testing     Left Hip   Planes of Motion   Flexion: 5  Abduction: 4+    Right Hip  "  Planes of Motion   Flexion: 5  Abduction: 4+    Left Knee   Flexion: 4+ (\"pain proximal lateral calf at hamstring/calf attachment point)  Extension: 4+  Quadriceps contraction: good    Right Knee   Normal strength  Quadriceps contraction: good    Additional Strength Details  3/10 pain at proximal lateral gastroc attachment with 10 L SL Heel Raises, improved from 6/10 pain at IE    Tests     Left Knee   Negative anterior drawer, lateral Aaron, medial Aaron, patellar compression, posterior drawer, Thessaly's test at 5 degrees, Thessaly's test at 20 degrees, valgus stress test at 0 degrees and varus stress test at 0 degrees.     Ambulation   Weight-Bearing Status   Assistive device used: none    Ambulation: Level Surfaces   Ambulation without assistive device: independent    Observational Gait   Gait: within functional limits              Precautions: Standard  Access Code: [email or web address]      POC expires Unit limit Auth  expiration date PT/OT + Visit Limit?   4/4/25 4 N/A 36 per cly                 Visit/Unit Tracking  AUTH Status:  Date 1/9 1/16 1/24 1/31 2/7          36 per cly Used 1 2 3 4 5           Remaining  35 34 33 32 31               Manuals 1/9 1/16 1/24 1/31 2/7        IASTM L prox lateral gastroc  BE LH TS BE                                               Neuro Re-Ed   1/24          Rockerboard taps fwd/bwd, left/right     With holds   20x ea dir     2x15\"  20x  ea taps, bal 2x15\" ea          L SLS  3x30\" 3x30\" Foam 3x30\"         Star slider exercise              Bwd lunge with slider                                                     Ther Ex   1/24          Upright bike for LE endurance  Rec L1 10' 10' L1 10' L1  Rec L1 10'        Standing calf stretch at wall vs long sitting with strap HEP  Prostretch   3x30\" 3x30\" ea gastroc/soleus 3x30\" ea          Standing HR off step ecc focus HEP            Standing HR with tennis ball between ankles HEP            SL TG HR  L22 2x10 L22 3x10 L22 3x10        "   Matrix knee ext             Matrix hamstring curls                          Pt education PT POC, HEP, anatomy, pathophysiology     Re-evaluation, HEP review                     Ther Activity   1/24          SL TG squats  L22 2x10 L22 3x10 L22 3x10          Bosu squats  2x10 2x10 2x10          Bosu fwd and lat step ups   15x ea 20x ea 2x10 ea                      Gait Training                                       Modalities

## 2025-02-14 ENCOUNTER — APPOINTMENT (OUTPATIENT)
Dept: PHYSICAL THERAPY | Facility: CLINIC | Age: 40
End: 2025-02-14
Payer: COMMERCIAL

## 2025-02-21 ENCOUNTER — OFFICE VISIT (OUTPATIENT)
Dept: PHYSICAL THERAPY | Facility: CLINIC | Age: 40
End: 2025-02-21
Payer: COMMERCIAL

## 2025-02-21 DIAGNOSIS — S86.812D STRAIN OF CALF MUSCLE, LEFT, SUBSEQUENT ENCOUNTER: ICD-10-CM

## 2025-02-21 DIAGNOSIS — M25.562 ACUTE PAIN OF LEFT KNEE: Primary | ICD-10-CM

## 2025-02-21 PROCEDURE — 97530 THERAPEUTIC ACTIVITIES: CPT

## 2025-02-21 PROCEDURE — 97140 MANUAL THERAPY 1/> REGIONS: CPT

## 2025-02-21 PROCEDURE — 97110 THERAPEUTIC EXERCISES: CPT

## 2025-02-21 NOTE — PROGRESS NOTES
"Daily Note     Today's date: 2025  Patient name: Elie Thomas  : 1985  MRN: [id number]  Referring provider: Yinka Quinteros DO  Dx:   Encounter Diagnosis     ICD-10-CM    1. Acute pain of left knee  M25.562       2. Strain of calf muscle, left, subsequent encounter  S86.812D                      Subjective: Patient offers no complaints upon arrival. Denies changes to knee since previous visit.       Objective: See treatment diary below      Assessment: Tolerated treatment well. Progressed hamstring and quadriceps strengthening exercises as outlined below. No pain reported during session, however did express muscle fatigue. Patient demonstrated fatigue post treatment, exhibited good technique with therapeutic exercises, and would benefit from continued PT      Plan: Continue per plan of care.      Precautions: Standard  Access Code: [email or web address]      POC expires Unit limit Auth  expiration date PT/OT + Visit Limit?   25 4 N/A 36 per cly                 Visit/Unit Tracking  AUTH Status:  Date          36 per cly Used 1 2 3 4 5 6          Remaining  35 34 33 32 31 30              Manuals        IASTM L prox lateral gastroc  BE LH TS BE BE                                              Neuro Re-Ed             Rockerboard taps fwd/bwd, left/right     With holds   20x ea dir     2x15\"  20x  ea taps, bal 2x15\" ea          L SLS  3x30\" 3x30\" Foam 3x30\"         Star slider exercise       10x       Bwd lunge with slider       10x                                               Ther Ex             Upright bike for LE endurance  Rec L1 10' 10' L1 10' L1  Rec L1 10' L1 10'       Standing calf stretch at wall vs long sitting with strap HEP  Prostretch   3x30\" 3x30\" ea gastroc/soleus 3x30\" ea          Standing HR off step ecc focus HEP            Standing HR with tennis ball between ankles HEP            SL TG HR  L22 2x10 L22 3x10 L22 3x10   L22 3x10     "   Matrix knee ext      50# 2x10        Matrix hamstring curls      80# 2x10                    Pt education PT POC, HEP, anatomy, pathophysiology     Re-evaluation, HEP review                     Ther Activity   1/24          SL TG squats  L22 2x10 L22 3x10 L22 3x10   L22 3x10       Bosu squats  2x10 2x10 2x10   3x10        Bosu fwd and lat step ups   15x ea 20x ea 2x10 ea                      Gait Training                                       Modalities

## 2025-02-28 ENCOUNTER — OFFICE VISIT (OUTPATIENT)
Dept: PHYSICAL THERAPY | Facility: CLINIC | Age: 40
End: 2025-02-28
Payer: COMMERCIAL

## 2025-02-28 DIAGNOSIS — M25.562 ACUTE PAIN OF LEFT KNEE: Primary | ICD-10-CM

## 2025-02-28 DIAGNOSIS — S86.812D STRAIN OF CALF MUSCLE, LEFT, SUBSEQUENT ENCOUNTER: ICD-10-CM

## 2025-02-28 PROCEDURE — 97110 THERAPEUTIC EXERCISES: CPT

## 2025-02-28 PROCEDURE — 97112 NEUROMUSCULAR REEDUCATION: CPT

## 2025-02-28 PROCEDURE — 97530 THERAPEUTIC ACTIVITIES: CPT

## 2025-02-28 NOTE — PROGRESS NOTES
"Daily Note     Today's date: 2025  Patient name: Elie Thomas  : 1985  MRN: [id number]  Referring provider: Yinka Quinteros DO  Dx:   Encounter Diagnosis     ICD-10-CM    1. Acute pain of left knee  M25.562       2. Strain of calf muscle, left, subsequent encounter  S86.748R                      Subjective: Patient denies pain upon arrival to session. He was quite sore after his last visit with the progressions made to his program.      Objective: See treatment diary below      Assessment: Tolerated treatment well. No production of knee/calf pain during session. Able to complete all exercises with proper form. Patient demonstrated fatigue post treatment, exhibited good technique with therapeutic exercises, and would benefit from continued PT      Plan: Continue per plan of care.      Precautions: Standard  Access Code: [email or web address]      POC expires Unit limit Auth  expiration date PT/OT + Visit Limit?   25 4 N/A 36 per cly                 Visit/Unit Tracking  AUTH Status:  Date         36 per cly Used 1 2 3 4 5 6 7         Remaining  35 34 33 32 31 30 29             Manuals       IASTM L prox lateral gastroc  BE LH TS BE BE                                              Neuro Re-Ed             Rockerboard taps fwd/bwd, left/right     With holds   20x ea dir     2x15\"  20x  ea taps, bal 2x15\" ea          L SLS  3x30\" 3x30\" Foam 3x30\"         Star slider exercise       10x 10x       Bwd lunge with slider       10x  10x                                             Ther Ex             Upright bike for LE endurance  Rec L1 10' 10' L1 10' L1  Rec L1 10' L1 10' L5 10'      Standing calf stretch at wall vs long sitting with strap HEP  Prostretch   3x30\" 3x30\" ea gastroc/soleus 3x30\" ea          Standing HR off step ecc focus HEP            Standing HR with tennis ball between ankles HEP            SL TG HR  L22 2x10 L22 3x10 L22 3x10   L22 " 3x10 L22 3x10      Matrix knee ext      50# 2x10  50# 3x10      Matrix hamstring curls      80# 2x10 80# 3x10                   Pt education PT POC, HEP, anatomy, pathophysiology     Re-evaluation, HEP review                     Ther Activity   1/24          SL TG squats  L22 2x10 L22 3x10 L22 3x10   L22 3x10 L22 3x10      Bosu squats  2x10 2x10 2x10   3x10  3x10      Bosu fwd and lat step ups   15x ea 20x ea 2x10 ea   20x ea                    Gait Training                                       Modalities

## 2025-03-07 ENCOUNTER — EVALUATION (OUTPATIENT)
Dept: PHYSICAL THERAPY | Facility: CLINIC | Age: 40
End: 2025-03-07
Payer: COMMERCIAL

## 2025-03-07 DIAGNOSIS — S86.812D STRAIN OF CALF MUSCLE, LEFT, SUBSEQUENT ENCOUNTER: ICD-10-CM

## 2025-03-07 DIAGNOSIS — M25.562 ACUTE PAIN OF LEFT KNEE: Primary | ICD-10-CM

## 2025-03-07 PROCEDURE — 97110 THERAPEUTIC EXERCISES: CPT

## 2025-03-07 NOTE — PROGRESS NOTES
"PT Discharge    Today's date: 3/7/2025  Patient name: Elie Thomas  : 1985  MRN: [id number]  Referring provider: Yinka Quinteros DO  Dx:   Encounter Diagnosis     ICD-10-CM    1. Acute pain of left knee  M25.562       2. Strain of calf muscle, left, subsequent encounter  S86.812D                      Assessment  Symptom irritability: low    Assessment details: Elie Thomas is a 39 y.o. male presenting to physical therapy for a reevaluation with a MD referral of acute pain of left knee, strain of left calf muscle. Since their initial evaluation, he reports 80% improvement in his left calf strain. Subjectively, he reports continued reductions in the intensity and frequency in which he experiences lateral calf pain. He reports his symptoms are still aggravated by extended time spent sitting, however they are quick to resolve, within 10 steps. He demonstrates WNL left hip, knee, and ankle strength. He was also able to complete SL heel raises on the left side without pain production, which is something he wasn't able to do previously. He denies any TTP throughout the calf musculature. He has met his goals, is independent with HEP, and wishes to be DC from PT services at this time.   Understanding of Dx/Px/POC: excellent     Prognosis: good    Goals  STG to be achieved in 4 weeks:   The patient will report a decrease in left knee \"at worst\" subjective pain rating score to at least 3/10 to allow for improved tolerance for weightbearing activities.  MET  The patient will be able to complete 10 SL HR on the left lower extremity with <3/10 pain demonstrating improved calf strength. MET    LTG to be achieved by DC:    The patient will be independent in comprehensive HEP. MET  The patient will report no pain with usual activities.  MET  The patient will increase all left knee MMT score to WFL to allow for improved functional mobility. MET  The patient will be able to complete sit to stand and floor to stand " transfers without knee pain allowing for play with his daughter. MET      Plan  Patient would benefit from: home program    Plan of Care beginning date: 3/7/2025  Plan of Care expiration date: 3/7/2025  Treatment plan discussed with: patient        Subjective Evaluation    History of Present Illness  Mechanism of injury: Elie reports 80% improvement since beginning PT services. He reports that since his last re-evaluation, he notes continued reduction in intensity and frequency of lateral calf pain. He notes that sitting for extended periods of time continues to be the aggravating factor for it as when he stands up he feels pain in this distribution. He most often notices it following sitting in a figure 4 position in his desk. It does resolve quickly, within 10 steps or so. He has not noticed pain occurring with pivoting, twisting, or flexion/extension movements of his knee.       Patient Goals  Patient goals for therapy: decreased pain and increased strength    Pain  Current pain ratin  At best pain ratin  At worst pain rating: 3  Location: Lateral aspect of left knee  Quality: dull ache  Relieving factors: change in position, ice and medications  Aggravating factors: sitting (Sitting in figure 4 position at desk)  Progression: improved    Social Support  Lives with: young children and spouse    Employment status: working ([employer] work)    Diagnostic Tests  X-ray: normal  Treatments  No previous or current treatments  Current treatment: physical therapy        Objective     Palpation   Left   No palpable tenderness to the distal biceps femoris, distal semimembranosus, distal semitendinosus, lateral gastrocnemius and medial gastrocnemius.     Tenderness   Left Knee   No tenderness in the inferior patella, lateral joint line, lateral patella, LCL (distal), LCL (proximal), MCL (distal), MCL (proximal), medial joint line, medial patella, patellar tendon, popliteal fossa, quadriceps tendon and  "superior patella.     Active Range of Motion   Left Knee   Normal active range of motion    Right Knee   Normal active range of motion  Left Ankle/Foot   Normal active range of motion    Right Ankle/Foot   Normal active range of motion    Mobility   Patellar Mobility:   Left Knee   WFL: medial, lateral, superior and inferior.     Right Knee   WFL: medial, lateral, superior and inferior    Strength/Myotome Testing     Left Hip   Planes of Motion   Flexion: 5  Abduction: 5    Right Hip   Planes of Motion   Flexion: 5  Abduction: 5    Left Knee   Flexion: 5  Extension: 5  Quadriceps contraction: good    Right Knee   Normal strength  Quadriceps contraction: good    Additional Strength Details  No pain with SL heel raises     Tests     Left Knee   Negative anterior drawer, lateral Aaron, medial Aaron, patellar compression, posterior drawer, Thessaly's test at 5 degrees, Thessaly's test at 20 degrees, valgus stress test at 0 degrees and varus stress test at 0 degrees.     Ambulation   Weight-Bearing Status   Assistive device used: none    Ambulation: Level Surfaces   Ambulation without assistive device: independent    Observational Gait   Gait: within functional limits              Precautions: Standard  Access Code: [email or web address]      POC expires Unit limit Auth  expiration date PT/OT + Visit Limit?   4/4/25 4 N/A 36 per cly                 Visit/Unit Tracking  AUTH Status:  Date 1/9 1/16 1/24 1/31 2/7 2/21 2/28 3/7       36 per cly Used 1 2 3 4 5 6 7 8        Remaining  35 34 33 32 31 30 29 28            Manuals 1/9 1/16 1/24 1/31 2/7 2/21 2/28 3/7     IASTM L prox lateral gastroc  BE LH TS BE BE                                              Neuro Re-Ed   1/24          Rockerboard taps fwd/bwd, left/right     With holds   20x ea dir     2x15\"  20x  ea taps, bal 2x15\" ea          L SLS  3x30\" 3x30\" Foam 3x30\"         Star slider exercise       10x 10x       Bwd lunge with slider       10x  10x                             " "                Ther Ex   1/24          Upright bike for LE endurance  Rec L1 10' 10' L1 10' L1  Rec L1 10' L1 10' L5 10' L5 10'     Standing calf stretch at wall vs long sitting with strap HEP  Prostretch   3x30\" 3x30\" ea gastroc/soleus 3x30\" ea          Standing HR off step ecc focus HEP            Standing HR with tennis ball between ankles HEP            SL TG HR  L22 2x10 L22 3x10 L22 3x10   L22 3x10 L22 3x10      Matrix knee ext      50# 2x10  50# 3x10      Matrix hamstring curls      80# 2x10 80# 3x10                   Pt education PT POC, HEP, anatomy, pathophysiology     Re-evaluation, HEP review   Re-evaluation                  Ther Activity   1/24          SL TG squats  L22 2x10 L22 3x10 L22 3x10   L22 3x10 L22 3x10      Bosu squats  2x10 2x10 2x10   3x10  3x10      Bosu fwd and lat step ups   15x ea 20x ea 2x10 ea   20x ea                    Gait Training                                       Modalities                                                "

## 2025-04-04 DIAGNOSIS — I10 PRIMARY HYPERTENSION: ICD-10-CM

## 2025-04-04 RX ORDER — AMLODIPINE BESYLATE 10 MG/1
10 TABLET ORAL DAILY
Qty: 90 TABLET | Refills: 1 | Status: SHIPPED | OUTPATIENT
Start: 2025-04-04

## 2025-04-05 ENCOUNTER — APPOINTMENT (OUTPATIENT)
Dept: LAB | Facility: CLINIC | Age: 40
End: 2025-04-05
Payer: COMMERCIAL

## 2025-04-05 DIAGNOSIS — Z13.6 SCREENING FOR CARDIOVASCULAR CONDITION: ICD-10-CM

## 2025-04-05 LAB
ALBUMIN SERPL BCG-MCNC: 4.7 G/DL (ref 3.5–5)
ALP SERPL-CCNC: 55 U/L (ref 34–104)
ALT SERPL W P-5'-P-CCNC: 49 U/L (ref 7–52)
ANION GAP SERPL CALCULATED.3IONS-SCNC: 9 MMOL/L (ref 4–13)
AST SERPL W P-5'-P-CCNC: 23 U/L (ref 13–39)
BILIRUB SERPL-MCNC: 0.4 MG/DL (ref 0.2–1)
BUN SERPL-MCNC: 15 MG/DL (ref 5–25)
CALCIUM SERPL-MCNC: 9.4 MG/DL (ref 8.4–10.2)
CHLORIDE SERPL-SCNC: 104 MMOL/L (ref 96–108)
CHOLEST SERPL-MCNC: 140 MG/DL (ref ?–200)
CO2 SERPL-SCNC: 29 MMOL/L (ref 21–32)
CREAT SERPL-MCNC: 0.89 MG/DL (ref 0.6–1.3)
GFR SERPL CREATININE-BSD FRML MDRD: 108 ML/MIN/1.73SQ M
GLUCOSE P FAST SERPL-MCNC: 99 MG/DL (ref 65–99)
HDLC SERPL-MCNC: 41 MG/DL
LDLC SERPL CALC-MCNC: 83 MG/DL (ref 0–100)
NONHDLC SERPL-MCNC: 99 MG/DL
POTASSIUM SERPL-SCNC: 3.8 MMOL/L (ref 3.5–5.3)
PROT SERPL-MCNC: 7.3 G/DL (ref 6.4–8.4)
SODIUM SERPL-SCNC: 142 MMOL/L (ref 135–147)
TRIGL SERPL-MCNC: 82 MG/DL (ref ?–150)

## 2025-04-05 PROCEDURE — 80053 COMPREHEN METABOLIC PANEL: CPT

## 2025-04-05 PROCEDURE — 80061 LIPID PANEL: CPT

## 2025-04-05 PROCEDURE — 36415 COLL VENOUS BLD VENIPUNCTURE: CPT

## 2025-04-08 ENCOUNTER — OFFICE VISIT (OUTPATIENT)
Dept: FAMILY MEDICINE CLINIC | Facility: CLINIC | Age: 40
End: 2025-04-08
Payer: COMMERCIAL

## 2025-04-08 VITALS
DIASTOLIC BLOOD PRESSURE: 84 MMHG | WEIGHT: 255 LBS | SYSTOLIC BLOOD PRESSURE: 132 MMHG | HEIGHT: 74 IN | HEART RATE: 96 BPM | TEMPERATURE: 99.5 F | BODY MASS INDEX: 32.73 KG/M2 | OXYGEN SATURATION: 99 %

## 2025-04-08 DIAGNOSIS — R09.81 NASAL CONGESTION: ICD-10-CM

## 2025-04-08 DIAGNOSIS — J06.9 VIRAL URI WITH COUGH: Primary | ICD-10-CM

## 2025-04-08 LAB
SARS-COV-2 AG UPPER RESP QL IA: NEGATIVE
VALID CONTROL: NORMAL

## 2025-04-08 PROCEDURE — 87811 SARS-COV-2 COVID19 W/OPTIC: CPT | Performed by: PHYSICIAN ASSISTANT

## 2025-04-08 PROCEDURE — 99213 OFFICE O/P EST LOW 20 MIN: CPT | Performed by: PHYSICIAN ASSISTANT

## 2025-04-08 NOTE — PROGRESS NOTES
"Name: Elie Thomas      : 1985      MRN: [id number]  Encounter Provider: Gissel Briggs PA-C  Encounter Date: 2025   Encounter department: [facility] [address]  :  Assessment & Plan  Viral URI with cough  Continue allergy medications  Take vitamin C, D and zinc  Increase fluids and rest  Use saline nasal spray       Nasal congestion  Continue allergy medications    Orders:  •  POCT Rapid Covid Ag           History of Present Illness   URI   This is a new problem. The current episode started in the past 7 days. The problem has been gradually worsening. There has been no fever. Associated symptoms include chest pain, congestion, coughing, diarrhea, ear pain, headaches, nausea, a plugged ear sensation and a sore throat. Pertinent negatives include no abdominal pain, dysuria, joint pain, joint swelling, neck pain, rash, rhinorrhea, sinus pain, sneezing, swollen glands, vomiting or wheezing. He has tried antihistamine for the symptoms. The treatment provided mild relief.     Review of Systems   Constitutional:  Positive for fatigue. Negative for fever.   HENT:  Positive for congestion, ear pain, postnasal drip and sore throat. Negative for rhinorrhea, sinus pain and sneezing.    Respiratory:  Positive for cough. Negative for shortness of breath and wheezing.         Chest hurts with coughing   Cardiovascular:  Positive for chest pain.   Gastrointestinal:  Positive for diarrhea and nausea. Negative for abdominal pain and vomiting.   Genitourinary:  Negative for dysuria.   Musculoskeletal:  Negative for joint pain and neck pain.   Skin:  Negative for rash.   Neurological:  Positive for headaches.       Objective   /84   Pulse 96   Temp 99.5 °F (37.5 °C)   Ht 6' 2\" (1.88 m)   Wt 116 kg (255 lb)   SpO2 99%   BMI 32.74 kg/m²      Physical Exam  Constitutional:       Appearance: Normal appearance. He is ill-appearing.   HENT:      Head: Normocephalic.     "  Right Ear: Tympanic membrane, ear canal and external ear normal.      Left Ear: Tympanic membrane, ear canal and external ear normal.      Nose: Congestion present.      Mouth/Throat:      Mouth: Mucous membranes are moist.      Pharynx: Oropharynx is clear. Posterior oropharyngeal erythema and uvula swelling present. No oropharyngeal exudate.   Eyes:      Conjunctiva/sclera: Conjunctivae normal.   Cardiovascular:      Rate and Rhythm: Normal rate and regular rhythm.      Heart sounds: Normal heart sounds.   Pulmonary:      Effort: Pulmonary effort is normal.      Breath sounds: Normal breath sounds.   Lymphadenopathy:      Cervical: No cervical adenopathy.   Neurological:      Mental Status: He is alert and oriented to person, place, and time.   Psychiatric:         Mood and Affect: Mood normal.         Behavior: Behavior normal.

## 2025-04-08 NOTE — ASSESSMENT & PLAN NOTE
Continue allergy medications  Take vitamin C, D and zinc  Increase fluids and rest  Use saline nasal spray

## 2025-04-09 DIAGNOSIS — K29.00 OTHER ACUTE GASTRITIS WITHOUT HEMORRHAGE: ICD-10-CM

## 2025-04-09 RX ORDER — ESOMEPRAZOLE MAGNESIUM 40 MG/1
40 CAPSULE, DELAYED RELEASE ORAL DAILY
Qty: 90 CAPSULE | Refills: 0 | Status: SHIPPED | OUTPATIENT
Start: 2025-04-09

## 2025-04-12 ENCOUNTER — APPOINTMENT (OUTPATIENT)
Dept: RADIOLOGY | Facility: MEDICAL CENTER | Age: 40
End: 2025-04-12
Payer: COMMERCIAL

## 2025-04-12 ENCOUNTER — OFFICE VISIT (OUTPATIENT)
Dept: URGENT CARE | Facility: MEDICAL CENTER | Age: 40
End: 2025-04-12
Payer: COMMERCIAL

## 2025-04-12 VITALS
SYSTOLIC BLOOD PRESSURE: 128 MMHG | HEART RATE: 87 BPM | TEMPERATURE: 98.3 F | RESPIRATION RATE: 20 BRPM | OXYGEN SATURATION: 96 % | DIASTOLIC BLOOD PRESSURE: 88 MMHG

## 2025-04-12 DIAGNOSIS — J20.9 ACUTE BRONCHITIS, UNSPECIFIED ORGANISM: Primary | ICD-10-CM

## 2025-04-12 DIAGNOSIS — R06.02 SHORTNESS OF BREATH: ICD-10-CM

## 2025-04-12 PROCEDURE — 99214 OFFICE O/P EST MOD 30 MIN: CPT

## 2025-04-12 PROCEDURE — 71046 X-RAY EXAM CHEST 2 VIEWS: CPT

## 2025-04-12 RX ORDER — BENZONATATE 200 MG/1
200 CAPSULE ORAL 3 TIMES DAILY PRN
Qty: 20 CAPSULE | Refills: 0 | Status: SHIPPED | OUTPATIENT
Start: 2025-04-12

## 2025-04-12 RX ORDER — ALBUTEROL SULFATE 90 UG/1
2 INHALANT RESPIRATORY (INHALATION) EVERY 6 HOURS PRN
Qty: 8.5 G | Refills: 0 | Status: SHIPPED | OUTPATIENT
Start: 2025-04-12

## 2025-04-12 RX ORDER — AZITHROMYCIN 250 MG/1
TABLET, FILM COATED ORAL
Qty: 6 TABLET | Refills: 0 | Status: SHIPPED | OUTPATIENT
Start: 2025-04-12 | End: 2025-04-16

## 2025-04-12 RX ORDER — METHYLPREDNISOLONE 4 MG/1
TABLET ORAL
Qty: 1 EACH | Refills: 0 | Status: SHIPPED | OUTPATIENT
Start: 2025-04-12

## 2025-04-12 NOTE — PATIENT INSTRUCTIONS
Elie presented for evaluation of cough today.    A virus is likely causing their symptoms.    Albuterol inhaler as prescribed  Cough suppressant as prescribed    Take steroids as prescribed.   Recommend to take them in the morning and with food  Do not take Ibuprofen while on steroids.   May take Acetaminophen for pain.  Discussed that steroids may elevated blood glucose levels and that pt should monitor blood sugars closely.     If patient has worsening symptoms, may start antibiotics as directed.     Take antibiotics as prescribed.   Take entire course of antibiotics.     Eat yogurt with live and active cultures and/or take a probiotic at least 3 hours before or after antibiotic dose.   Monitor stool for diarrhea and/or blood. If this occurs, contact primary care doctor ASAP.     Recommend the following options: room humidifier, vicks vapo rub, hot/steamy shower.  Offer fluids frequently to help with hydration, as staying hydrated helps loosen up thick mucous.   May drink warm water with honey. May use lemon.  Tylenol/Ibuprofen for pain/fever.  Encourage coughing into the elbow instead of the hand.   Washing hands frequently with warm water and soap may help stop spread of infection.    If symptoms do not improve please follow with PCP for further evaluation.    Follow up with PCP in 3-5 days.  Proceed to ER if symptoms worsen.    If tests are performed, our office will contact you with results only if changes need to made to the care plan discussed with you at the visit. You can review your full results on [facility].

## 2025-04-12 NOTE — PROGRESS NOTES
[facility]        NAME: Elie Thomas is a 39 y.o. male  : 1985    MRN: [id number]  DATE: 2025  TIME: 6:25 PM    Assessment and Plan   Acute bronchitis, unspecified organism [J20.9]  1. Acute bronchitis, unspecified organism  XR chest pa and lateral    albuterol (ProAir HFA) 90 mcg/act inhaler    benzonatate (TESSALON) 200 MG capsule    azithromycin (ZITHROMAX) 250 mg tablet    methylPREDNISolone 4 MG tablet therapy pack        XR chest pa and lateral: No acute findings per my read. Pending radiology final read.     Patient Instructions   Elie presented for evaluation of cough today.    A virus is likely causing their symptoms.    Albuterol inhaler as prescribed  Cough suppressant as prescribed    Take steroids as prescribed.   Recommend to take them in the morning and with food  Do not take Ibuprofen while on steroids.   May take Acetaminophen for pain.  Discussed that steroids may elevated blood glucose levels and that pt should monitor blood sugars closely.     If patient has worsening symptoms, may start antibiotics as directed.     Take antibiotics as prescribed.   Take entire course of antibiotics.     Eat yogurt with live and active cultures and/or take a probiotic at least 3 hours before or after antibiotic dose.   Monitor stool for diarrhea and/or blood. If this occurs, contact primary care doctor ASAP.     Recommend the following options: room humidifier, vicks vapo rub, hot/steamy shower.  Offer fluids frequently to help with hydration, as staying hydrated helps loosen up thick mucous.   May drink warm water with honey. May use lemon.  Tylenol/Ibuprofen for pain/fever.  Encourage coughing into the elbow instead of the hand.   Washing hands frequently with warm water and soap may help stop spread of infection.    If symptoms do not improve please follow with PCP for further evaluation.    Follow up with PCP in 3-5 days.  Proceed to ER if symptoms worsen.    If tests are  performed, our office will contact you with results only if changes need to made to the care plan discussed with you at the visit. You can review your full results on [facility].    Follow up with PCP in 3-5 days.  Proceed to  ER if symptoms worsen.    If tests are performed, our office will contact you with results only if changes need to made to the care plan discussed with you at the visit. [facility]    Chief Complaint     Chief Complaint   Patient presents with   • URI     Patient states for over two weeks has had had worsening uri symptoms; states he has cough, chest congestion, mild shortness of breath with exertion         History of Present Illness   Pt is a 40 y/o M who presents to the clinic for a CC of worsening URI symtpoms.    Pt was seen at his PCP on 4/8/2025 for URI symptoms including fatigue, congestion, ear pain, PND, sore throat, chest hurting when coughing, diarrhea, nausea and headache. Rapid COVID negative. Pt was dx with Viral URI with cough.     URI   This is a new problem. The current episode started 1 to 4 weeks ago (x2 weeks). The problem has been gradually worsening. There has been no fever. Associated symptoms include congestion (chest), coughing and wheezing. Pertinent negatives include no abdominal pain, chest pain, diarrhea, ear pain, headaches, nausea, rash, rhinorrhea, sinus pain, sore throat (resolved) or vomiting. Treatments tried: Mucenix, Flonase. The treatment provided no relief.       Review of Systems   Review of Systems   Constitutional:  Positive for appetite change and fatigue. Negative for chills, diaphoresis and fever.   HENT:  Positive for congestion (chest), postnasal drip and sinus pressure. Negative for ear discharge, ear pain, rhinorrhea, sinus pain and sore throat (resolved).    Respiratory:  Positive for cough, shortness of breath (with exertion) and wheezing.    Cardiovascular: Negative.  Negative for chest  pain and palpitations.   Gastrointestinal:  Negative for abdominal pain, constipation, diarrhea, nausea and vomiting.   Musculoskeletal:  Positive for myalgias.   Skin:  Negative for color change, rash and wound.   Neurological:  Negative for headaches.     Current Medications       Current Outpatient Medications:   •  albuterol (ProAir HFA) 90 mcg/act inhaler, Inhale 2 puffs every 6 (six) hours as needed for wheezing or shortness of breath, Disp: 8.5 g, Rfl: 0  •  amLODIPine (NORVASC) 10 mg tablet, TAKE ONE TABLET BY MOUTH EVERY DAY, Disp: 90 tablet, Rfl: 1  •  azithromycin (ZITHROMAX) 250 mg tablet, Take 2 tablets today then 1 tablet daily x 4 days, Disp: 6 tablet, Rfl: 0  •  benzonatate (TESSALON) 200 MG capsule, Take 1 capsule (200 mg total) by mouth 3 (three) times a day as needed for cough, Disp: 20 capsule, Rfl: 0  •  esomeprazole (NexIUM) 40 MG capsule, TAKE ONE CAPSULE BY MOUTH EVERY DAY, Disp: 90 capsule, Rfl: 0  •  methylPREDNISolone 4 MG tablet therapy pack, Use as directed on package, Disp: 1 each, Rfl: 0  •  fluticasone (FLONASE) 50 mcg/act nasal spray, USE 1 SPRAY IN EACH NOSTRIL ONCE A DAY, Disp: 16 g, Rfl: 1  •  fluvoxaMINE (LUVOX) 100 mg tablet, TAKE ONE TABLET BY MOUTH EVERY DAY AT BEDTIME, Disp: 90 tablet, Rfl: 1  •  meclizine (ANTIVERT) 25 mg tablet, TAKE ONE TABLET BY MOUTH THREE TIMES A DAY AS NEEDED FOR DIZZINESS, Disp: 30 tablet, Rfl: 0  •  ondansetron (ZOFRAN-ODT) 4 mg disintegrating tablet, Take 1 tablet (4 mg total) by mouth every 6 (six) hours as needed for nausea or vomiting, Disp: 12 tablet, Rfl: 0    Current Allergies     Allergies as of 04/12/2025   • (No Known Allergies)            The following portions of the patient's history were reviewed and updated as appropriate: allergies, current medications, past family history, past medical history, past social history, past surgical history and problem list.     Past Medical History:   Diagnosis Date   • Anxiety    • Depression    • GERD  (gastroesophageal reflux disease)    • Headache(784.0)    • History of stomach ulcers    • Male infertility, unspecified     Varicocele   • Moderate episode of recurrent major depressive disorder (HCC) 05/05/2021   • Panic attack    • Sleep apnea, obstructive        Past Surgical History:   Procedure Laterality Date   • EYE SURGERY Left    • NASAL SEPTUM SURGERY N/A    • NASAL SEPTUM SURGERY     • MD ESOPHAGOGASTRODUODENOSCOPY TRANSORAL DIAGNOSTIC N/A 3/25/2019    Procedure: ESOPHAGOGASTRODUODENOSCOPY (EGD);  Surgeon: Cesar Zuluaga DO;  Location: [facility];  Service: Gastroenterology       Family History   Problem Relation Age of Onset   • Hypothyroidism Mother    • Heart disease Father    • Anxiety disorder Father          Medications have been verified.        Objective   /88   Pulse 87   Temp 98.3 °F (36.8 °C) (Temporal)   Resp 20   SpO2 96%        Physical Exam     Physical Exam  Vitals and nursing note reviewed.   Constitutional:       General: He is not in acute distress.     Appearance: Normal appearance. He is not ill-appearing, toxic-appearing or diaphoretic.   HENT:      Head: Normocephalic.      Right Ear: Tympanic membrane, ear canal and external ear normal. There is no impacted cerumen.      Left Ear: Tympanic membrane, ear canal and external ear normal. There is no impacted cerumen.      Nose: Septal deviation, mucosal edema and rhinorrhea present. No congestion. Rhinorrhea is clear.      Right Turbinates: Enlarged and swollen.      Left Turbinates: Enlarged and swollen.      Right Sinus: No maxillary sinus tenderness or frontal sinus tenderness.      Left Sinus: No maxillary sinus tenderness or frontal sinus tenderness.      Mouth/Throat:      Mouth: Mucous membranes are moist.      Pharynx: No posterior oropharyngeal erythema.   Cardiovascular:      Rate and Rhythm: Normal rate and regular rhythm.      Pulses: Normal pulses.      Heart sounds: Normal heart sounds. No murmur  heard.  Pulmonary:      Effort: No respiratory distress.      Breath sounds: No stridor, decreased air movement or transmitted upper airway sounds. Examination of the right-middle field reveals wheezing. Examination of the right-lower field reveals wheezing. Wheezing present. No rhonchi or rales.   Chest:      Chest wall: No tenderness.   Musculoskeletal:         General: Normal range of motion.   Lymphadenopathy:      Cervical: No cervical adenopathy.   Skin:     General: Skin is warm.   Neurological:      Mental Status: He is alert.

## 2025-04-13 ENCOUNTER — RESULTS FOLLOW-UP (OUTPATIENT)
Dept: URGENT CARE | Facility: MEDICAL CENTER | Age: 40
End: 2025-04-13

## 2025-04-14 ENCOUNTER — OFFICE VISIT (OUTPATIENT)
Dept: FAMILY MEDICINE CLINIC | Facility: CLINIC | Age: 40
End: 2025-04-14
Payer: COMMERCIAL

## 2025-04-14 VITALS
BODY MASS INDEX: 32.08 KG/M2 | OXYGEN SATURATION: 97 % | HEART RATE: 90 BPM | TEMPERATURE: 98 F | DIASTOLIC BLOOD PRESSURE: 96 MMHG | HEIGHT: 74 IN | WEIGHT: 250 LBS | SYSTOLIC BLOOD PRESSURE: 130 MMHG | RESPIRATION RATE: 12 BRPM

## 2025-04-14 DIAGNOSIS — F41.9 ANXIETY: ICD-10-CM

## 2025-04-14 DIAGNOSIS — I10 PRIMARY HYPERTENSION: Primary | ICD-10-CM

## 2025-04-14 DIAGNOSIS — G47.33 OSA (OBSTRUCTIVE SLEEP APNEA): ICD-10-CM

## 2025-04-14 PROBLEM — H11.9 CONJUNCTIVA DISORDER: Status: RESOLVED | Noted: 2019-05-10 | Resolved: 2025-04-14

## 2025-04-14 PROBLEM — J06.9 VIRAL URI WITH COUGH: Status: RESOLVED | Noted: 2025-04-08 | Resolved: 2025-04-14

## 2025-04-14 PROCEDURE — 99214 OFFICE O/P EST MOD 30 MIN: CPT | Performed by: FAMILY MEDICINE

## 2025-04-14 NOTE — PROGRESS NOTES
"Name: Elie Thomas      : 1985      MRN: [id number]  Encounter Provider: Elie Zuleta DO  Encounter Date: 2025   Encounter department: [facility] [address] [facility]  :  Assessment & Plan  Primary hypertension  Continue amlodipine, he has joined a gym and is going to start an exercise program.  Will check his blood pressure in office in 6 months.       JODY (obstructive sleep apnea)  Stable, he is compliant with his CPAP       Anxiety  Continue the fluvoxamine, he states this is working well.              History of Present Illness   Patient comes in today for checkup, he states that he has joined a gym and is starting an exercise program.  He does continue to take his medications prescribed.  He is compliant with his CPAP.      Review of Systems   Constitutional:  Negative for chills, fatigue and fever.   HENT:  Negative for congestion, ear pain, hearing loss, postnasal drip, rhinorrhea and sore throat.    Eyes:  Negative for pain and visual disturbance.   Respiratory:  Negative for chest tightness, shortness of breath and wheezing.    Cardiovascular:  Negative for chest pain and leg swelling.   Gastrointestinal:  Negative for abdominal distention, abdominal pain, constipation, diarrhea and vomiting.   Endocrine: Negative for cold intolerance and heat intolerance.   Genitourinary:  Negative for difficulty urinating, frequency and urgency.   Musculoskeletal:  Negative for arthralgias and gait problem.   Skin:  Negative for color change.   Neurological:  Negative for dizziness, tremors, syncope, numbness and headaches.   Hematological:  Negative for adenopathy.   Psychiatric/Behavioral:  Negative for agitation, confusion and sleep disturbance. The patient is not nervous/anxious.        Objective   /96 (BP Location: Left arm, Cuff Size: Extra-Large)   Pulse 90   Temp 98 °F (36.7 °C)   Resp 12   Ht 6' 2\" (1.88 m)   Wt 113 kg (250 lb)   SpO2 97%   BMI 32.10 kg/m² "      Physical Exam  Constitutional:       Appearance: He is well-developed.   HENT:      Head: Normocephalic.      Right Ear: External ear normal.      Left Ear: External ear normal.      Nose: Nose normal.   Eyes:      Extraocular Movements: Extraocular movements intact.      Conjunctiva/sclera: Conjunctivae normal.      Pupils: Pupils are equal, round, and reactive to light.   Neck:      Thyroid: No thyromegaly.   Cardiovascular:      Rate and Rhythm: Normal rate and regular rhythm.      Heart sounds: Normal heart sounds.   Pulmonary:      Effort: Pulmonary effort is normal.      Breath sounds: Normal breath sounds.   Abdominal:      General: Bowel sounds are normal.      Palpations: Abdomen is soft.   Musculoskeletal:         General: Normal range of motion.      Cervical back: Normal range of motion.   Skin:     General: Skin is warm and dry.   Neurological:      Mental Status: He is alert and oriented to person, place, and time.   Psychiatric:         Mood and Affect: Mood normal.         Behavior: Behavior normal.

## 2025-04-14 NOTE — ASSESSMENT & PLAN NOTE
Continue amlodipine, he has joined a gym and is going to start an exercise program.  Will check his blood pressure in office in 6 months.

## 2025-05-23 DIAGNOSIS — F41.9 ANXIETY: ICD-10-CM

## 2025-05-25 RX ORDER — FLUVOXAMINE MALEATE 100 MG
100 TABLET ORAL
Qty: 90 TABLET | Refills: 1 | Status: SHIPPED | OUTPATIENT
Start: 2025-05-25

## 2025-07-13 DIAGNOSIS — K29.00 OTHER ACUTE GASTRITIS WITHOUT HEMORRHAGE: ICD-10-CM

## 2025-07-15 RX ORDER — ESOMEPRAZOLE MAGNESIUM 40 MG/1
40 CAPSULE, DELAYED RELEASE ORAL DAILY
Qty: 90 CAPSULE | Refills: 0 | Status: SHIPPED | OUTPATIENT
Start: 2025-07-15

## 2025-08-19 ENCOUNTER — APPOINTMENT (EMERGENCY)
Dept: RADIOLOGY | Facility: HOSPITAL | Age: 40
End: 2025-08-19
Payer: COMMERCIAL

## 2025-08-19 ENCOUNTER — HOSPITAL ENCOUNTER (EMERGENCY)
Facility: HOSPITAL | Age: 40
Discharge: HOME | End: 2025-08-19
Payer: COMMERCIAL

## 2025-08-19 VITALS
SYSTOLIC BLOOD PRESSURE: 176 MMHG | RESPIRATION RATE: 22 BRPM | DIASTOLIC BLOOD PRESSURE: 98 MMHG | HEART RATE: 83 BPM | TEMPERATURE: 99.4 F | OXYGEN SATURATION: 99 % | BODY MASS INDEX: 32.08 KG/M2 | HEIGHT: 74 IN | WEIGHT: 250 LBS

## 2025-08-19 DIAGNOSIS — M72.2 PLANTAR FASCIITIS: Primary | ICD-10-CM

## 2025-08-19 PROCEDURE — 99283 EMERGENCY DEPT VISIT LOW MDM: CPT

## 2025-08-19 PROCEDURE — 99284 EMERGENCY DEPT VISIT MOD MDM: CPT

## 2025-08-19 PROCEDURE — 73650 X-RAY EXAM OF HEEL: CPT

## 2025-08-19 RX ORDER — IBUPROFEN 600 MG/1
600 TABLET, FILM COATED ORAL ONCE
Status: COMPLETED | OUTPATIENT
Start: 2025-08-19 | End: 2025-08-19

## 2025-08-19 RX ORDER — ACETAMINOPHEN 325 MG/1
975 TABLET ORAL ONCE
Status: COMPLETED | OUTPATIENT
Start: 2025-08-19 | End: 2025-08-19

## 2025-08-19 RX ADMIN — ACETAMINOPHEN 975 MG: 325 TABLET ORAL at 22:46

## 2025-08-19 RX ADMIN — IBUPROFEN 600 MG: 600 TABLET ORAL at 22:46

## 2025-08-20 ENCOUNTER — VBI (OUTPATIENT)
Dept: FAMILY MEDICINE CLINIC | Facility: CLINIC | Age: 40
End: 2025-08-20

## 2025-08-21 ENCOUNTER — OFFICE VISIT (OUTPATIENT)
Dept: FAMILY MEDICINE CLINIC | Facility: CLINIC | Age: 40
End: 2025-08-21
Payer: COMMERCIAL

## 2025-08-21 VITALS
HEART RATE: 89 BPM | BODY MASS INDEX: 32.08 KG/M2 | DIASTOLIC BLOOD PRESSURE: 84 MMHG | WEIGHT: 250 LBS | HEIGHT: 74 IN | SYSTOLIC BLOOD PRESSURE: 126 MMHG | TEMPERATURE: 98.2 F | OXYGEN SATURATION: 98 %

## 2025-08-21 DIAGNOSIS — F41.9 ANXIETY: ICD-10-CM

## 2025-08-21 DIAGNOSIS — I10 PRIMARY HYPERTENSION: ICD-10-CM

## 2025-08-21 DIAGNOSIS — S93.691D RUPTURE OF PLANTAR FASCIA OF RIGHT FOOT, SUBSEQUENT ENCOUNTER: Primary | ICD-10-CM

## 2025-08-21 PROCEDURE — 99214 OFFICE O/P EST MOD 30 MIN: CPT | Performed by: FAMILY MEDICINE

## 2025-08-22 ENCOUNTER — OFFICE VISIT (OUTPATIENT)
Dept: FAMILY MEDICINE CLINIC | Facility: CLINIC | Age: 40
End: 2025-08-22
Payer: COMMERCIAL

## 2025-08-22 ENCOUNTER — TELEPHONE (OUTPATIENT)
Age: 40
End: 2025-08-22

## 2025-08-22 VITALS
WEIGHT: 253.8 LBS | DIASTOLIC BLOOD PRESSURE: 80 MMHG | HEART RATE: 98 BPM | HEIGHT: 74 IN | SYSTOLIC BLOOD PRESSURE: 152 MMHG | OXYGEN SATURATION: 99 % | TEMPERATURE: 97.5 F | BODY MASS INDEX: 32.57 KG/M2

## 2025-08-22 DIAGNOSIS — I10 PRIMARY HYPERTENSION: Primary | ICD-10-CM

## 2025-08-22 DIAGNOSIS — F41.9 ANXIETY: ICD-10-CM

## 2025-08-22 DIAGNOSIS — G47.33 OSA (OBSTRUCTIVE SLEEP APNEA): ICD-10-CM

## 2025-08-22 DIAGNOSIS — E66.09 CLASS 1 OBESITY DUE TO EXCESS CALORIES WITH SERIOUS COMORBIDITY AND BODY MASS INDEX (BMI) OF 32.0 TO 32.9 IN ADULT: ICD-10-CM

## 2025-08-22 DIAGNOSIS — E66.811 CLASS 1 OBESITY DUE TO EXCESS CALORIES WITH SERIOUS COMORBIDITY AND BODY MASS INDEX (BMI) OF 32.0 TO 32.9 IN ADULT: ICD-10-CM

## 2025-08-22 PROBLEM — E66.9 OBESITY (BMI 30-39.9): Status: ACTIVE | Noted: 2021-05-05

## 2025-08-22 PROBLEM — R42 DIZZINESS: Status: RESOLVED | Noted: 2020-04-09 | Resolved: 2025-08-22

## 2025-08-22 PROCEDURE — 99213 OFFICE O/P EST LOW 20 MIN: CPT | Performed by: FAMILY MEDICINE

## 2025-08-22 RX ORDER — LORAZEPAM 0.5 MG/1
0.5 TABLET ORAL EVERY 8 HOURS PRN
Qty: 30 TABLET | Refills: 0 | Status: SHIPPED | OUTPATIENT
Start: 2025-08-22

## 2025-08-22 RX ORDER — TIRZEPATIDE 2.5 MG/.5ML
2.5 INJECTION, SOLUTION SUBCUTANEOUS WEEKLY
Qty: 2 ML | Refills: 0 | Status: SHIPPED | OUTPATIENT
Start: 2025-08-22 | End: 2025-09-19